# Patient Record
Sex: MALE | Race: WHITE | Employment: UNEMPLOYED | URBAN - METROPOLITAN AREA
[De-identification: names, ages, dates, MRNs, and addresses within clinical notes are randomized per-mention and may not be internally consistent; named-entity substitution may affect disease eponyms.]

---

## 2018-04-03 PROBLEM — F29 PSYCHOSIS (HCC): Status: ACTIVE | Noted: 2018-04-03

## 2018-04-04 PROBLEM — F22 DELUSIONS (HCC): Status: ACTIVE | Noted: 2018-04-04

## 2018-04-10 PROBLEM — F33.9 MAJOR DEPRESSIVE DISORDER, RECURRENT EPISODE (HCC): Status: ACTIVE | Noted: 2018-04-10

## 2018-04-10 PROBLEM — F15.20 AMPHETAMINE USE DISORDER, MODERATE (HCC): Status: ACTIVE | Noted: 2018-04-10

## 2018-04-10 PROBLEM — F22 DELUSIONAL DISORDER (HCC): Status: ACTIVE | Noted: 2018-04-10

## 2023-03-31 ENCOUNTER — HOSPITAL ENCOUNTER (EMERGENCY)
Age: 43
Discharge: LEFT BEFORE DISCHARGE | End: 2023-03-31
Attending: EMERGENCY MEDICINE
Payer: MEDICAID

## 2023-03-31 ENCOUNTER — APPOINTMENT (OUTPATIENT)
Dept: CT IMAGING | Age: 43
End: 2023-03-31
Payer: MEDICAID

## 2023-03-31 VITALS
HEIGHT: 70 IN | DIASTOLIC BLOOD PRESSURE: 76 MMHG | BODY MASS INDEX: 25.05 KG/M2 | WEIGHT: 175 LBS | RESPIRATION RATE: 21 BRPM | SYSTOLIC BLOOD PRESSURE: 103 MMHG | TEMPERATURE: 97.9 F | HEART RATE: 78 BPM | OXYGEN SATURATION: 99 %

## 2023-03-31 DIAGNOSIS — R55 VASOVAGAL REACTION: Primary | ICD-10-CM

## 2023-03-31 DIAGNOSIS — S01.01XA LACERATION OF SCALP, INITIAL ENCOUNTER: ICD-10-CM

## 2023-03-31 DIAGNOSIS — S09.90XA CLOSED HEAD INJURY, INITIAL ENCOUNTER: ICD-10-CM

## 2023-03-31 LAB
ALBUMIN SERPL-MCNC: 3.4 G/DL (ref 3.5–5.2)
ALP SERPL-CCNC: 72 U/L (ref 40–129)
ALT SERPL-CCNC: 16 U/L (ref 0–40)
AMPHET UR QL SCN: NOT DETECTED
ANION GAP SERPL CALCULATED.3IONS-SCNC: 13 MMOL/L (ref 7–16)
AST SERPL-CCNC: 24 U/L (ref 0–39)
BARBITURATES UR QL SCN: NOT DETECTED
BASOPHILS # BLD: 0.04 E9/L (ref 0–0.2)
BASOPHILS NFR BLD: 0.6 % (ref 0–2)
BENZODIAZ UR QL SCN: NOT DETECTED
BILIRUB SERPL-MCNC: 0.3 MG/DL (ref 0–1.2)
BILIRUB UR QL STRIP: NEGATIVE
BUN SERPL-MCNC: 4 MG/DL (ref 6–20)
CALCIUM SERPL-MCNC: 9 MG/DL (ref 8.6–10.2)
CANNABINOIDS UR QL SCN: NOT DETECTED
CHLORIDE SERPL-SCNC: 98 MMOL/L (ref 98–107)
CLARITY UR: CLEAR
CO2 SERPL-SCNC: 26 MMOL/L (ref 22–29)
COCAINE UR QL SCN: NOT DETECTED
COLOR UR: YELLOW
CREAT SERPL-MCNC: 0.9 MG/DL (ref 0.7–1.2)
DRUG SCREEN COMMENT UR-IMP: ABNORMAL
EKG ATRIAL RATE: 86 BPM
EKG P AXIS: 50 DEGREES
EKG P-R INTERVAL: 150 MS
EKG Q-T INTERVAL: 380 MS
EKG QRS DURATION: 90 MS
EKG QTC CALCULATION (BAZETT): 454 MS
EKG R AXIS: 58 DEGREES
EKG T AXIS: 63 DEGREES
EKG VENTRICULAR RATE: 86 BPM
EOSINOPHIL # BLD: 0.11 E9/L (ref 0.05–0.5)
EOSINOPHIL NFR BLD: 1.7 % (ref 0–6)
ERYTHROCYTE [DISTWIDTH] IN BLOOD BY AUTOMATED COUNT: 15.9 FL (ref 11.5–15)
FENTANYL SCREEN, URINE: NOT DETECTED
GLUCOSE SERPL-MCNC: 97 MG/DL (ref 74–99)
GLUCOSE UR STRIP-MCNC: NEGATIVE MG/DL
HCT VFR BLD AUTO: 34.3 % (ref 37–54)
HGB BLD-MCNC: 11.7 G/DL (ref 12.5–16.5)
HGB UR QL STRIP: NEGATIVE
IMM GRANULOCYTES # BLD: 0.02 E9/L
IMM GRANULOCYTES NFR BLD: 0.3 % (ref 0–5)
KETONES UR STRIP-MCNC: NEGATIVE MG/DL
LACTATE BLDV-SCNC: 2.8 MMOL/L (ref 0.5–2.2)
LEUKOCYTE ESTERASE UR QL STRIP: NEGATIVE
LYMPHOCYTES # BLD: 0.67 E9/L (ref 1.5–4)
LYMPHOCYTES NFR BLD: 10.5 % (ref 20–42)
MCH RBC QN AUTO: 32.1 PG (ref 26–35)
MCHC RBC AUTO-ENTMCNC: 34.1 % (ref 32–34.5)
MCV RBC AUTO: 94.2 FL (ref 80–99.9)
METHADONE UR QL SCN: NOT DETECTED
MONOCYTES # BLD: 0.63 E9/L (ref 0.1–0.95)
MONOCYTES NFR BLD: 9.9 % (ref 2–12)
NEUTROPHILS # BLD: 4.9 E9/L (ref 1.8–7.3)
NEUTS SEG NFR BLD: 77 % (ref 43–80)
NITRITE UR QL STRIP: NEGATIVE
OPIATES UR QL SCN: POSITIVE
OXYCODONE URINE: NOT DETECTED
PCP UR QL SCN: NOT DETECTED
PH UR STRIP: 6.5 [PH] (ref 5–9)
PLATELET # BLD AUTO: 213 E9/L (ref 130–450)
PMV BLD AUTO: 8.9 FL (ref 7–12)
POTASSIUM SERPL-SCNC: 3.8 MMOL/L (ref 3.5–5)
PROT SERPL-MCNC: 6.2 G/DL (ref 6.4–8.3)
PROT UR STRIP-MCNC: NEGATIVE MG/DL
RBC # BLD AUTO: 3.64 E12/L (ref 3.8–5.8)
SODIUM SERPL-SCNC: 137 MMOL/L (ref 132–146)
SP GR UR STRIP: <=1.005 (ref 1–1.03)
TROPONIN, HIGH SENSITIVITY: 10 NG/L (ref 0–11)
UROBILINOGEN UR STRIP-ACNC: 0.2 E.U./DL
WBC # BLD: 6.4 E9/L (ref 4.5–11.5)

## 2023-03-31 PROCEDURE — 90471 IMMUNIZATION ADMIN: CPT | Performed by: EMERGENCY MEDICINE

## 2023-03-31 PROCEDURE — 99284 EMERGENCY DEPT VISIT MOD MDM: CPT

## 2023-03-31 PROCEDURE — 80307 DRUG TEST PRSMV CHEM ANLYZR: CPT

## 2023-03-31 PROCEDURE — 90715 TDAP VACCINE 7 YRS/> IM: CPT | Performed by: EMERGENCY MEDICINE

## 2023-03-31 PROCEDURE — 81003 URINALYSIS AUTO W/O SCOPE: CPT

## 2023-03-31 PROCEDURE — 80053 COMPREHEN METABOLIC PANEL: CPT

## 2023-03-31 PROCEDURE — 83605 ASSAY OF LACTIC ACID: CPT

## 2023-03-31 PROCEDURE — 12002 RPR S/N/AX/GEN/TRNK2.6-7.5CM: CPT

## 2023-03-31 PROCEDURE — 70450 CT HEAD/BRAIN W/O DYE: CPT

## 2023-03-31 PROCEDURE — 36415 COLL VENOUS BLD VENIPUNCTURE: CPT

## 2023-03-31 PROCEDURE — 6370000000 HC RX 637 (ALT 250 FOR IP): Performed by: EMERGENCY MEDICINE

## 2023-03-31 PROCEDURE — 85025 COMPLETE CBC W/AUTO DIFF WBC: CPT

## 2023-03-31 PROCEDURE — 84484 ASSAY OF TROPONIN QUANT: CPT

## 2023-03-31 PROCEDURE — 6360000002 HC RX W HCPCS: Performed by: EMERGENCY MEDICINE

## 2023-03-31 RX ORDER — IBUPROFEN 400 MG/1
400 TABLET ORAL EVERY 8 HOURS PRN
Qty: 21 TABLET | Refills: 0 | Status: SHIPPED | OUTPATIENT
Start: 2023-03-31 | End: 2023-04-07

## 2023-03-31 RX ORDER — BACITRACIN, NEOMYCIN, POLYMYXIN B 400; 3.5; 5 [USP'U]/G; MG/G; [USP'U]/G
OINTMENT TOPICAL
Qty: 30 G | Refills: 0 | Status: SHIPPED | OUTPATIENT
Start: 2023-03-31 | End: 2023-04-10

## 2023-03-31 RX ORDER — ACETAMINOPHEN 500 MG
1000 TABLET ORAL ONCE
Status: COMPLETED | OUTPATIENT
Start: 2023-03-31 | End: 2023-03-31

## 2023-03-31 RX ORDER — 0.9 % SODIUM CHLORIDE 0.9 %
2000 INTRAVENOUS SOLUTION INTRAVENOUS ONCE
Status: DISCONTINUED | OUTPATIENT
Start: 2023-03-31 | End: 2023-04-01 | Stop reason: HOSPADM

## 2023-03-31 RX ADMIN — ACETAMINOPHEN 1000 MG: 500 TABLET, FILM COATED ORAL at 19:00

## 2023-03-31 RX ADMIN — TETANUS TOXOID, REDUCED DIPHTHERIA TOXOID AND ACELLULAR PERTUSSIS VACCINE, ADSORBED 0.5 ML: 5; 2.5; 8; 8; 2.5 SUSPENSION INTRAMUSCULAR at 18:58

## 2023-03-31 ASSESSMENT — PAIN SCALES - GENERAL
PAINLEVEL_OUTOF10: 6
PAINLEVEL_OUTOF10: 6
PAINLEVEL_OUTOF10: 5
PAINLEVEL_OUTOF10: 4

## 2023-03-31 ASSESSMENT — PAIN DESCRIPTION - LOCATION
LOCATION: HEAD

## 2023-03-31 ASSESSMENT — PAIN DESCRIPTION - FREQUENCY: FREQUENCY: CONTINUOUS

## 2023-03-31 ASSESSMENT — PAIN DESCRIPTION - ONSET: ONSET: SUDDEN

## 2023-03-31 ASSESSMENT — PAIN DESCRIPTION - PAIN TYPE: TYPE: ACUTE PAIN

## 2023-03-31 ASSESSMENT — PAIN DESCRIPTION - DESCRIPTORS: DESCRIPTORS: PATIENT UNABLE TO DESCRIBE

## 2023-03-31 ASSESSMENT — PAIN - FUNCTIONAL ASSESSMENT: PAIN_FUNCTIONAL_ASSESSMENT: 0-10

## 2023-03-31 NOTE — ED NOTES
laceration to back of head cleansed with warm water & washcloth, then cleaned with sure-cleanse, ice pack given to patient     [place]  03/31/23 5751

## 2023-03-31 NOTE — ED PROVIDER NOTES
RBC 3.64 (L) 3.80 - 5.80 E12/L    Hemoglobin 11.7 (L) 12.5 - 16.5 g/dL    Hematocrit 34.3 (L) 37.0 - 54.0 %    MCV 94.2 80.0 - 99.9 fL    MCH 32.1 26.0 - 35.0 pg    MCHC 34.1 32.0 - 34.5 %    RDW 15.9 (H) 11.5 - 15.0 fL    Platelets 745 845 - 747 E9/L    MPV 8.9 7.0 - 12.0 fL    Neutrophils % 77.0 43.0 - 80.0 %    Immature Granulocytes % 0.3 0.0 - 5.0 %    Lymphocytes % 10.5 (L) 20.0 - 42.0 %    Monocytes % 9.9 2.0 - 12.0 %    Eosinophils % 1.7 0.0 - 6.0 %    Basophils % 0.6 0.0 - 2.0 %    Neutrophils Absolute 4.90 1.80 - 7.30 E9/L    Immature Granulocytes # 0.02 E9/L    Lymphocytes Absolute 0.67 (L) 1.50 - 4.00 E9/L    Monocytes Absolute 0.63 0.10 - 0.95 E9/L    Eosinophils Absolute 0.11 0.05 - 0.50 E9/L    Basophils Absolute 0.04 0.00 - 0.20 E9/L   Comprehensive Metabolic Panel   Result Value Ref Range    Sodium 137 132 - 146 mmol/L    Potassium 3.8 3.5 - 5.0 mmol/L    Chloride 98 98 - 107 mmol/L    CO2 26 22 - 29 mmol/L    Anion Gap 13 7 - 16 mmol/L    Glucose 97 74 - 99 mg/dL    BUN 4 (L) 6 - 20 mg/dL    Creatinine 0.9 0.7 - 1.2 mg/dL    Est, Glom Filt Rate >60 >=60 mL/min/1.73    Calcium 9.0 8.6 - 10.2 mg/dL    Total Protein 6.2 (L) 6.4 - 8.3 g/dL    Albumin 3.4 (L) 3.5 - 5.2 g/dL    Total Bilirubin 0.3 0.0 - 1.2 mg/dL    Alkaline Phosphatase 72 40 - 129 U/L    ALT 16 0 - 40 U/L    AST 24 0 - 39 U/L   Lactic Acid   Result Value Ref Range    Lactic Acid 2.8 (H) 0.5 - 2.2 mmol/L   Urinalysis   Result Value Ref Range    Color, UA Yellow Straw/Yellow    Clarity, UA Clear Clear    Glucose, Ur Negative Negative mg/dL    Bilirubin Urine Negative Negative    Ketones, Urine Negative Negative mg/dL    Specific Gravity, UA <=1.005 1.005 - 1.030    Blood, Urine Negative Negative    pH, UA 6.5 5.0 - 9.0    Protein, UA Negative Negative mg/dL    Urobilinogen, Urine 0.2 <2.0 E.U./dL    Nitrite, Urine Negative Negative    Leukocyte Esterase, Urine Negative Negative   Urine Drug Screen   Result Value Ref Range    Amphetamine ---------------------------------    IMPRESSION  No diagnosis found. DISPOSITION  Disposition: Discharge to home  Patient condition is stable      NOTE: This report was transcribed using voice recognition software.  Every effort was made to ensure accuracy; however, inadvertent computerized transcription errors may be present       Greg Fowler MD  03/31/23 0119

## 2023-04-01 ENCOUNTER — APPOINTMENT (OUTPATIENT)
Dept: CT IMAGING | Age: 43
End: 2023-04-01
Payer: MEDICAID

## 2023-04-01 ENCOUNTER — HOSPITAL ENCOUNTER (EMERGENCY)
Age: 43
Discharge: HOME | End: 2023-04-01
Payer: MEDICAID

## 2023-04-01 VITALS
SYSTOLIC BLOOD PRESSURE: 108 MMHG | DIASTOLIC BLOOD PRESSURE: 72 MMHG | HEART RATE: 90 BPM | RESPIRATION RATE: 16 BRPM | TEMPERATURE: 97.4 F | OXYGEN SATURATION: 97 %

## 2023-04-01 DIAGNOSIS — S00.81XA ABRASION OF FACE, INITIAL ENCOUNTER: ICD-10-CM
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
## 2023-04-01 PROCEDURE — 70450 CT HEAD/BRAIN W/O DYE: CPT

## 2023-04-01 PROCEDURE — 72125 CT NECK SPINE W/O DYE: CPT

## 2023-04-01 PROCEDURE — 99284 EMERGENCY DEPT VISIT MOD MDM: CPT

## 2023-04-01 ASSESSMENT — PAIN - FUNCTIONAL ASSESSMENT: PAIN_FUNCTIONAL_ASSESSMENT: NONE - DENIES PAIN

## 2023-04-01 ASSESSMENT — LIFESTYLE VARIABLES: HOW OFTEN DO YOU HAVE A DRINK CONTAINING ALCOHOL: NEVER

## 2023-04-01 NOTE — ED PROVIDER NOTES
16 99 % -- --         General:  NAD. Alert and Oriented. Well-appearing. Skin:  Warm, dry. No rashes. Head:  Normocephalic. 1 cm abrasion to the right eyebrow. 4 staples intact to the right occipital scalp. Eyes:  EOMI. Conjunctiva normal.  Pupils are pinpoint. ENT:  Oral mucosa moist.  Airway patent. Neck:  Supple. Normal ROM. Midline cervical spine is nontender to palpation. Respiratory:  No respiratory distress. No labored breathing. Lungs clear without rales, rhonchi or wheezing. Cardiovascular:  Regular rate. No peripheral edema. Extremities warm and good color. Chest:  Abdomen:  Soft, nondistended. Normal bowel sounds. Nontender to palpation all 4 quadrants. Negative rebound, negative guarding. Rectal:  Gu: Bladder nontender and non distended. No CVA tenderness. Pelvic:  Extremities:  Normal ROM. Nontender to palpation. Atraumatic. Back:  Normal ROM. Nontender to palpation. Neuro:  Alert and Oriented to person, place, time and situation. Normal LOC. Moves all extremities. Speech fluent. Psych: Not very cooperative. Initially curled up in a ball on the bed and refused to move. After some coaxing he did lay flat on the bed and move upper extremities and lower extremities. Lab / Imaging Results   (All laboratory and radiology results have been personally reviewed by myself)  Labs:  No results found for this visit on 04/01/23. Imaging: All Radiology results interpreted by Radiologist unless otherwise noted. CT CSpine W/O Contrast   Final Result   1. Motion artifacts causes misregistration the images in the served graphic   junction levels C7-T1. Patient could not proper cooperate for the procedure. 2.  Above the area of motion artifacts there is no indication for acute   trauma injury to the cervical spine. 3.  When patient condition permits consider re-evaluation of the cervical   spine attention 7 thoracic junction.       RECOMMENDATIONS:   Unavailable present.   END OF ED PROVIDER NOTE       John Escalante [place]  04/01/23 1259

## 2023-04-01 NOTE — ED NOTES
Pt given food and was able to ambulate independently, [facility] [employer] recovery and gave report to David, pt given discharge summary with education on fall and abrasion.   Pt informed to follow up with his primary care physician     Shantel Muñoz RN  04/01/23 9785

## 2023-04-01 NOTE — ED NOTES
Patient ambulated from Rm 6 to bathroom and back; ambulated well.      [place], [place]  20/61/37 3369

## 2023-04-01 NOTE — DISCHARGE INSTRUCTIONS
NOTE:  See your family doctor or go to Urgent Care or return here for staple suture removal in 7-8 days. Cleanse wound twice daily plus apply antibiotic ointment.

## 2023-09-30 ENCOUNTER — APPOINTMENT (OUTPATIENT)
Dept: RADIOLOGY | Facility: HOSPITAL | Age: 43
End: 2023-09-30
Payer: MEDICAID

## 2023-09-30 ENCOUNTER — HOSPITAL ENCOUNTER (EMERGENCY)
Facility: HOSPITAL | Age: 43
Discharge: HOME | End: 2023-10-01
Attending: EMERGENCY MEDICINE | Admitting: STUDENT IN AN ORGANIZED HEALTH CARE EDUCATION/TRAINING PROGRAM
Payer: MEDICAID

## 2023-09-30 VITALS
RESPIRATION RATE: 18 BRPM | DIASTOLIC BLOOD PRESSURE: 82 MMHG | SYSTOLIC BLOOD PRESSURE: 128 MMHG | OXYGEN SATURATION: 95 % | BODY MASS INDEX: 22.96 KG/M2 | HEART RATE: 79 BPM | HEIGHT: 69 IN | WEIGHT: 155 LBS | TEMPERATURE: 98.8 F

## 2023-09-30 DIAGNOSIS — T40.2X1A OPIOID OVERDOSE, ACCIDENTAL OR UNINTENTIONAL, INITIAL ENCOUNTER (MULTI): Primary | ICD-10-CM

## 2023-09-30 LAB
ALBUMIN SERPL BCP-MCNC: 4.1 G/DL (ref 3.4–5)
ALP SERPL-CCNC: 72 U/L (ref 33–120)
ALT SERPL W P-5'-P-CCNC: 13 U/L (ref 10–52)
ANION GAP SERPL CALC-SCNC: 10 MMOL/L (ref 10–20)
AST SERPL W P-5'-P-CCNC: 19 U/L (ref 9–39)
BASOPHILS # BLD AUTO: 0.05 X10*3/UL (ref 0–0.1)
BASOPHILS NFR BLD AUTO: 0.8 %
BILIRUB SERPL-MCNC: 0.3 MG/DL (ref 0–1.2)
BUN SERPL-MCNC: 12 MG/DL (ref 6–23)
CALCIUM SERPL-MCNC: 8.2 MG/DL (ref 8.6–10.3)
CARDIAC TROPONIN I PNL SERPL HS: 3 NG/L (ref 0–20)
CHLORIDE SERPL-SCNC: 106 MMOL/L (ref 98–107)
CO2 SERPL-SCNC: 28 MMOL/L (ref 21–32)
CREAT SERPL-MCNC: 0.83 MG/DL (ref 0.5–1.3)
EOSINOPHIL # BLD AUTO: 0.09 X10*3/UL (ref 0–0.7)
EOSINOPHIL NFR BLD AUTO: 1.4 %
ERYTHROCYTE [DISTWIDTH] IN BLOOD BY AUTOMATED COUNT: 14.9 % (ref 11.5–14.5)
ETHANOL SERPL-MCNC: 286 MG/DL
GFR SERPL CREATININE-BSD FRML MDRD: >90 ML/MIN/1.73M*2
GLUCOSE SERPL-MCNC: 109 MG/DL (ref 74–99)
HCT VFR BLD AUTO: 44.6 % (ref 41–52)
HGB BLD-MCNC: 15.1 G/DL (ref 13.5–17.5)
IMM GRANULOCYTES # BLD AUTO: 0.02 X10*3/UL (ref 0–0.7)
IMM GRANULOCYTES NFR BLD AUTO: 0.3 % (ref 0–0.9)
LYMPHOCYTES # BLD AUTO: 1.48 X10*3/UL (ref 1.2–4.8)
LYMPHOCYTES NFR BLD AUTO: 23 %
MAGNESIUM SERPL-MCNC: 2.15 MG/DL (ref 1.6–2.4)
MCH RBC QN AUTO: 33.1 PG (ref 26–34)
MCHC RBC AUTO-ENTMCNC: 33.9 G/DL (ref 32–36)
MCV RBC AUTO: 98 FL (ref 80–100)
MONOCYTES # BLD AUTO: 0.59 X10*3/UL (ref 0.1–1)
MONOCYTES NFR BLD AUTO: 9.2 %
NEUTROPHILS # BLD AUTO: 4.21 X10*3/UL (ref 1.2–7.7)
NEUTROPHILS NFR BLD AUTO: 65.3 %
NRBC BLD-RTO: 0 /100 WBCS (ref 0–0)
PLATELET # BLD AUTO: 219 X10*3/UL (ref 150–450)
PMV BLD AUTO: 9.6 FL (ref 7.5–11.5)
POTASSIUM SERPL-SCNC: 5 MMOL/L (ref 3.5–5.3)
PROT SERPL-MCNC: 7.3 G/DL (ref 6.4–8.2)
RBC # BLD AUTO: 4.56 X10*6/UL (ref 4.5–5.9)
SODIUM SERPL-SCNC: 139 MMOL/L (ref 136–145)
WBC # BLD AUTO: 6.4 X10*3/UL (ref 4.4–11.3)

## 2023-09-30 PROCEDURE — 83735 ASSAY OF MAGNESIUM: CPT | Performed by: EMERGENCY MEDICINE

## 2023-09-30 PROCEDURE — 2500000005 HC RX 250 GENERAL PHARMACY W/O HCPCS: Performed by: STUDENT IN AN ORGANIZED HEALTH CARE EDUCATION/TRAINING PROGRAM

## 2023-09-30 PROCEDURE — 99285 EMERGENCY DEPT VISIT HI MDM: CPT | Performed by: EMERGENCY MEDICINE

## 2023-09-30 PROCEDURE — 84484 ASSAY OF TROPONIN QUANT: CPT | Performed by: EMERGENCY MEDICINE

## 2023-09-30 PROCEDURE — 36415 COLL VENOUS BLD VENIPUNCTURE: CPT | Performed by: EMERGENCY MEDICINE

## 2023-09-30 PROCEDURE — 96361 HYDRATE IV INFUSION ADD-ON: CPT

## 2023-09-30 PROCEDURE — 80053 COMPREHEN METABOLIC PANEL: CPT | Performed by: EMERGENCY MEDICINE

## 2023-09-30 PROCEDURE — 99284 EMERGENCY DEPT VISIT MOD MDM: CPT | Mod: 25,27

## 2023-09-30 PROCEDURE — 2580000001 HC RX 258 IV SOLUTIONS: Performed by: EMERGENCY MEDICINE

## 2023-09-30 PROCEDURE — 2500000004 HC RX 250 GENERAL PHARMACY W/ HCPCS (ALT 636 FOR OP/ED): Performed by: STUDENT IN AN ORGANIZED HEALTH CARE EDUCATION/TRAINING PROGRAM

## 2023-09-30 PROCEDURE — 71045 X-RAY EXAM CHEST 1 VIEW: CPT | Performed by: RADIOLOGY

## 2023-09-30 PROCEDURE — 82077 ASSAY SPEC XCP UR&BREATH IA: CPT | Performed by: EMERGENCY MEDICINE

## 2023-09-30 PROCEDURE — 71045 X-RAY EXAM CHEST 1 VIEW: CPT | Mod: FY

## 2023-09-30 PROCEDURE — S0119 ONDANSETRON 4 MG: HCPCS | Performed by: STUDENT IN AN ORGANIZED HEALTH CARE EDUCATION/TRAINING PROGRAM

## 2023-09-30 PROCEDURE — 85025 COMPLETE CBC W/AUTO DIFF WBC: CPT | Performed by: EMERGENCY MEDICINE

## 2023-09-30 PROCEDURE — 2500000001 HC RX 250 WO HCPCS SELF ADMINISTERED DRUGS (ALT 637 FOR MEDICARE OP): Performed by: STUDENT IN AN ORGANIZED HEALTH CARE EDUCATION/TRAINING PROGRAM

## 2023-09-30 PROCEDURE — 2500000004 HC RX 250 GENERAL PHARMACY W/ HCPCS (ALT 636 FOR OP/ED): Performed by: EMERGENCY MEDICINE

## 2023-09-30 PROCEDURE — 96374 THER/PROPH/DIAG INJ IV PUSH: CPT | Mod: 59

## 2023-09-30 RX ORDER — ONDANSETRON 4 MG/1
4 TABLET, ORALLY DISINTEGRATING ORAL ONCE
Status: COMPLETED | OUTPATIENT
Start: 2023-09-30 | End: 2023-09-30

## 2023-09-30 RX ORDER — NALOXONE HYDROCHLORIDE 1 MG/ML
INJECTION INTRAMUSCULAR; INTRAVENOUS; SUBCUTANEOUS
Status: DISCONTINUED
Start: 2023-09-30 | End: 2023-10-01 | Stop reason: HOSPADM

## 2023-09-30 RX ORDER — NALOXONE HYDROCHLORIDE 0.4 MG/ML
0.2 INJECTION, SOLUTION INTRAMUSCULAR; INTRAVENOUS; SUBCUTANEOUS ONCE
Status: COMPLETED | OUTPATIENT
Start: 2023-09-30 | End: 2023-09-30

## 2023-09-30 RX ORDER — ALUMINUM HYDROXIDE, MAGNESIUM HYDROXIDE, AND SIMETHICONE 1200; 120; 1200 MG/30ML; MG/30ML; MG/30ML
10 SUSPENSION ORAL ONCE
Status: COMPLETED | OUTPATIENT
Start: 2023-09-30 | End: 2023-09-30

## 2023-09-30 RX ADMIN — ALUMINUM HYDROXIDE, MAGNESIUM HYDROXIDE, AND DIMETHICONE 10 ML: 200; 20; 200 SUSPENSION ORAL at 22:51

## 2023-09-30 RX ADMIN — ONDANSETRON 4 MG: 4 TABLET, ORALLY DISINTEGRATING ORAL at 22:52

## 2023-09-30 RX ADMIN — SODIUM CHLORIDE, POTASSIUM CHLORIDE, SODIUM LACTATE AND CALCIUM CHLORIDE 1000 ML: 600; 310; 30; 20 INJECTION, SOLUTION INTRAVENOUS at 16:56

## 2023-09-30 RX ADMIN — NALOXONE HYDROCHLORIDE 0.2 MG: 0.4 INJECTION, SOLUTION INTRAMUSCULAR; INTRAVENOUS; SUBCUTANEOUS at 13:00

## 2023-09-30 ASSESSMENT — PAIN SCALES - GENERAL
PAINLEVEL_OUTOF10: 0 - NO PAIN
PAINLEVEL_OUTOF10: 8
PAINLEVEL_OUTOF10: 7

## 2023-09-30 ASSESSMENT — PAIN - FUNCTIONAL ASSESSMENT: PAIN_FUNCTIONAL_ASSESSMENT: 0-10

## 2023-09-30 ASSESSMENT — COLUMBIA-SUICIDE SEVERITY RATING SCALE - C-SSRS
2. HAVE YOU ACTUALLY HAD ANY THOUGHTS OF KILLING YOURSELF?: NO
1. IN THE PAST MONTH, HAVE YOU WISHED YOU WERE DEAD OR WISHED YOU COULD GO TO SLEEP AND NOT WAKE UP?: NO
6. HAVE YOU EVER DONE ANYTHING, STARTED TO DO ANYTHING, OR PREPARED TO DO ANYTHING TO END YOUR LIFE?: NO

## 2023-09-30 NOTE — ED PROVIDER NOTES
HPI   Chief Complaint   Patient presents with    Chest Pain    Dysphagia       HPI  Patient presents to the emergency department with complaint of mouth pain and dry tongue.  He did complain of chest discomfort in triage but denies this on my assessment.  He does have a history of throat cancer and is on methadone for chronic pain he told the nurse that he took 5 of his methadone tablets this morning as well as drink alcohol because of his mouth pain.  He is quite somnolent likely due to excessive opiate use and the history is limited as obtained from the patient                  No data recorded                Patient History   Past Medical History:   Diagnosis Date    Immunization not carried out for unspecified reason     COVID-19 vaccination not done    Other specified abnormal findings of blood chemistry     High serum cholestanol     No past surgical history on file.  No family history on file.  Social History     Tobacco Use    Smoking status: Not on file    Smokeless tobacco: Not on file   Substance Use Topics    Alcohol use: Not on file    Drug use: Not on file       Physical Exam   ED Triage Vitals [09/30/23 1219]   Temp Heart Rate Resp BP   38.1 °C (100.6 °F) 101 18 131/84      SpO2 Temp Source Heart Rate Source Patient Position   94 % Tympanic Monitor --      BP Location FiO2 (%)     -- --       Physical Exam  Vitals reviewed.   Constitutional:       General: He is awake. He is not in acute distress.     Appearance: Normal appearance. He is well-developed.      Comments: Somnolent from opiate use   HENT:      Head: Normocephalic and atraumatic.      Mouth/Throat:      Mouth: Mucous membranes are moist.   Eyes:      Extraocular Movements: Extraocular movements intact.      Conjunctiva/sclera: Conjunctivae normal.   Neck:      Comments: Mucous membranes are dry dentition poor    Cardiovascular:      Rate and Rhythm: Normal rate and regular rhythm.      Heart sounds: Normal heart sounds.   Pulmonary:       Effort: Pulmonary effort is normal. No respiratory distress.      Breath sounds: Normal breath sounds. No stridor. No wheezing.   Abdominal:      General: Bowel sounds are normal. There is no distension.      Palpations: Abdomen is soft.      Tenderness: There is no abdominal tenderness. There is no guarding or rebound.   Musculoskeletal:         General: No tenderness.      Cervical back: Neck supple.   Skin:     General: Skin is warm and dry.      Findings: No rash.   Neurological:      General: No focal deficit present.      Mental Status: Mental status is at baseline.      Cranial Nerves: Cranial nerves 2-12 are intact.      Motor: Motor function is intact.   Psychiatric:         Mood and Affect: Mood normal.         Behavior: Behavior normal.         Thought Content: Thought content normal.         ED Course & MDM        Medical Decision Making  Patient admits to taking 5 of his methadone tablets this morning as well as drinking alcohol due to mouth pain he states that his tongue feels very dry.  He is somnolent with periods of apnea and hypoxic he required 0.2 of Narcan IV for opiate overdose and woke right up after Narcan administration with improvement in his respirations and respiratory status.  He was placed on the cardiac IV monitor peripheral IV access is obtained labs are drawn.    EKG performed and independently interpreted by me as showing normal sinus rhythm at a ventricular rate of 91 with anterior septal Q waves indicating old infarct QRS duration of 94 QTc was 445 axis is upright and normal ST segments are flat not elevated no signs of acute ischemia    He was maintained on the cardiac monitor and required supplemental oxygen for hypoxia due to opiate intoxication.  His medical records were reviewed including all prescription records showing he has a history of polysubstance use disorder and has come to the emergency department twice in the last couple of days for flulike symptoms with diffuse  vague symptoms of viral illness but was able to be discharged.  He was given IV fluids for dry mucous membranes as he does clinically appear dehydrated we will reassess his pain once he awakens it but it seems like he is comfortable with his home regimen.  Once his labs are resulted and are all within normal limits he will likely be able to be discharged from the emergency department in stable condition but with his oncology team    The patient was sleeping comfortably in the emergency department and labs thus far are resulting within normal limits I did review his medical chart and the patient will need to metabolize and be reassessed he was found to have a significantly elevated ethanol level and he is intoxicated from opiate overdose.  Patient was signed out to the oncoming provider to follow-up on reevaluation once he is clinically sober      Procedure  Procedures     Clementina Peter MD  09/30/23 6502

## 2023-10-01 ENCOUNTER — APPOINTMENT (OUTPATIENT)
Dept: RADIOLOGY | Facility: HOSPITAL | Age: 43
End: 2023-10-01
Payer: MEDICAID

## 2023-10-01 ENCOUNTER — HOSPITAL ENCOUNTER (EMERGENCY)
Facility: HOSPITAL | Age: 43
Discharge: HOME | End: 2023-10-01
Attending: EMERGENCY MEDICINE | Admitting: EMERGENCY MEDICINE
Payer: MEDICAID

## 2023-10-01 VITALS
HEIGHT: 69 IN | HEART RATE: 71 BPM | TEMPERATURE: 98 F | BODY MASS INDEX: 22.22 KG/M2 | SYSTOLIC BLOOD PRESSURE: 114 MMHG | OXYGEN SATURATION: 96 % | WEIGHT: 150 LBS | RESPIRATION RATE: 16 BRPM | DIASTOLIC BLOOD PRESSURE: 73 MMHG

## 2023-10-01 DIAGNOSIS — K85.90 ACUTE PANCREATITIS, UNSPECIFIED COMPLICATION STATUS, UNSPECIFIED PANCREATITIS TYPE (HHS-HCC): Primary | ICD-10-CM

## 2023-10-01 LAB
ALBUMIN SERPL BCP-MCNC: 4.2 G/DL (ref 3.4–5)
ALP SERPL-CCNC: 85 U/L (ref 33–120)
ALT SERPL W P-5'-P-CCNC: 10 U/L (ref 10–52)
ANION GAP SERPL CALC-SCNC: 15 MMOL/L (ref 10–20)
AST SERPL W P-5'-P-CCNC: 19 U/L (ref 9–39)
BASOPHILS # BLD AUTO: 0.05 X10*3/UL (ref 0–0.1)
BASOPHILS NFR BLD AUTO: 0.3 %
BILIRUB SERPL-MCNC: 0.7 MG/DL (ref 0–1.2)
BUN SERPL-MCNC: 14 MG/DL (ref 6–23)
CALCIUM SERPL-MCNC: 8.4 MG/DL (ref 8.6–10.3)
CHLORIDE SERPL-SCNC: 95 MMOL/L (ref 98–107)
CO2 SERPL-SCNC: 27 MMOL/L (ref 21–32)
CREAT SERPL-MCNC: 0.78 MG/DL (ref 0.5–1.3)
CREAT SERPL-MCNC: 0.8 MG/DL (ref 0.5–1.3)
EOSINOPHIL # BLD AUTO: 0.02 X10*3/UL (ref 0–0.7)
EOSINOPHIL NFR BLD AUTO: 0.1 %
ERYTHROCYTE [DISTWIDTH] IN BLOOD BY AUTOMATED COUNT: 14 % (ref 11.5–14.5)
GFR SERPL CREATININE-BSD FRML MDRD: >90 ML/MIN/1.73M*2
GFR SERPL CREATININE-BSD FRML MDRD: >90 ML/MIN/1.73M*2
GLUCOSE SERPL-MCNC: 91 MG/DL (ref 74–99)
HCT VFR BLD AUTO: 44.3 % (ref 41–52)
HGB BLD-MCNC: 14.7 G/DL (ref 13.5–17.5)
HOLD SPECIMEN: NORMAL
IMM GRANULOCYTES # BLD AUTO: 0.05 X10*3/UL (ref 0–0.7)
IMM GRANULOCYTES NFR BLD AUTO: 0.3 % (ref 0–0.9)
LACTATE SERPL-SCNC: 1 MMOL/L (ref 0.4–2)
LACTATE SERPL-SCNC: 2.8 MMOL/L (ref 0.4–2)
LIPASE SERPL-CCNC: 389 U/L (ref 9–82)
LYMPHOCYTES # BLD AUTO: 1.13 X10*3/UL (ref 1.2–4.8)
LYMPHOCYTES NFR BLD AUTO: 7.8 %
MCH RBC QN AUTO: 32.6 PG (ref 26–34)
MCHC RBC AUTO-ENTMCNC: 33.2 G/DL (ref 32–36)
MCV RBC AUTO: 98 FL (ref 80–100)
MONOCYTES # BLD AUTO: 1.19 X10*3/UL (ref 0.1–1)
MONOCYTES NFR BLD AUTO: 8.2 %
NEUTROPHILS # BLD AUTO: 12.02 X10*3/UL (ref 1.2–7.7)
NEUTROPHILS NFR BLD AUTO: 83.3 %
NRBC BLD-RTO: 0 /100 WBCS (ref 0–0)
PLATELET # BLD AUTO: 212 X10*3/UL (ref 150–450)
PMV BLD AUTO: 8.7 FL (ref 7.5–11.5)
POTASSIUM SERPL-SCNC: 3.9 MMOL/L (ref 3.5–5.3)
PROT SERPL-MCNC: 7.5 G/DL (ref 6.4–8.2)
RBC # BLD AUTO: 4.51 X10*6/UL (ref 4.5–5.9)
SODIUM SERPL-SCNC: 133 MMOL/L (ref 136–145)
WBC # BLD AUTO: 14.5 X10*3/UL (ref 4.4–11.3)

## 2023-10-01 PROCEDURE — 99284 EMERGENCY DEPT VISIT MOD MDM: CPT | Performed by: EMERGENCY MEDICINE

## 2023-10-01 PROCEDURE — 36415 COLL VENOUS BLD VENIPUNCTURE: CPT | Performed by: NURSE PRACTITIONER

## 2023-10-01 PROCEDURE — 74177 CT ABD & PELVIS W/CONTRAST: CPT | Mod: FOREIGN READ | Performed by: RADIOLOGY

## 2023-10-01 PROCEDURE — 83690 ASSAY OF LIPASE: CPT | Performed by: NURSE PRACTITIONER

## 2023-10-01 PROCEDURE — 74177 CT ABD & PELVIS W/CONTRAST: CPT | Mod: FR

## 2023-10-01 PROCEDURE — 83605 ASSAY OF LACTIC ACID: CPT | Performed by: NURSE PRACTITIONER

## 2023-10-01 PROCEDURE — 2500000004 HC RX 250 GENERAL PHARMACY W/ HCPCS (ALT 636 FOR OP/ED): Performed by: NURSE PRACTITIONER

## 2023-10-01 PROCEDURE — 80053 COMPREHEN METABOLIC PANEL: CPT | Performed by: NURSE PRACTITIONER

## 2023-10-01 PROCEDURE — 96374 THER/PROPH/DIAG INJ IV PUSH: CPT | Mod: 59

## 2023-10-01 PROCEDURE — 82565 ASSAY OF CREATININE: CPT | Mod: 59 | Performed by: NURSE PRACTITIONER

## 2023-10-01 PROCEDURE — 85025 COMPLETE CBC W/AUTO DIFF WBC: CPT | Performed by: NURSE PRACTITIONER

## 2023-10-01 PROCEDURE — 2550000001 HC RX 255 CONTRASTS: Performed by: NURSE PRACTITIONER

## 2023-10-01 RX ORDER — LIDOCAINE HYDROCHLORIDE 20 MG/ML
1.25 SOLUTION OROPHARYNGEAL AS NEEDED
Qty: 100 ML | Refills: 0 | Status: SHIPPED | OUTPATIENT
Start: 2023-10-01 | End: 2023-10-02

## 2023-10-01 RX ORDER — ONDANSETRON HYDROCHLORIDE 2 MG/ML
4 INJECTION, SOLUTION INTRAVENOUS ONCE
Status: COMPLETED | OUTPATIENT
Start: 2023-10-01 | End: 2023-10-01

## 2023-10-01 RX ADMIN — ONDANSETRON 4 MG: 2 INJECTION INTRAMUSCULAR; INTRAVENOUS at 07:53

## 2023-10-01 RX ADMIN — IOHEXOL 75 ML: 350 INJECTION, SOLUTION INTRAVENOUS at 08:53

## 2023-10-01 RX ADMIN — SODIUM CHLORIDE 1000 ML: 9 INJECTION, SOLUTION INTRAVENOUS at 07:54

## 2023-10-01 NOTE — ED PROVIDER NOTES
HPI   Chief Complaint   Patient presents with    Vomiting       42-year-old male with a past history of esophageal cancer, hyperlipidemia, hypertension presents to the emergency department today complaining of nausea, vomiting, dry mouth and difficulty swallowing.  Patient states he has been having this since yesterday.  He was seen in our emergency department yesterday and discharged home.  No fever or chills.  No dizziness, headache, syncope.  No chest pain or back pain.  Denies any significant abdominal pain at this time.  He does have some Zofran at home but did not take anything.  Was discharged yesterday with antibiotics as well.  No dizziness, headache or syncope.                          [place] Coma Scale Score: 15                  Patient History   Past Medical History:   Diagnosis Date    Immunization not carried out for unspecified reason     COVID-19 vaccination not done    Other specified abnormal findings of blood chemistry     High serum cholestanol     History reviewed. No pertinent surgical history.  No family history on file.  Social History     Tobacco Use    Smoking status: Not on file    Smokeless tobacco: Not on file   Substance Use Topics    Alcohol use: Not on file    Drug use: Not on file       Physical Exam   ED Triage Vitals [10/01/23 0135]   Temp Heart Rate Resp BP   36.7 °C (98 °F) 87 16 125/79      SpO2 Temp Source Heart Rate Source Patient Position   96 % Temporal -- --      BP Location FiO2 (%)     -- --       Physical Exam  Vitals and nursing note reviewed.   Constitutional:       General: He is not in acute distress.     Appearance: Normal appearance. He is not toxic-appearing.   HENT:      Right Ear: Tympanic membrane normal.      Left Ear: Tympanic membrane normal.      Mouth/Throat:      Mouth: Mucous membranes are moist.      Pharynx: Oropharynx is clear.   Eyes:      Extraocular Movements: Extraocular movements intact.      Pupils: Pupils are equal, round, and reactive to  light.   Cardiovascular:      Rate and Rhythm: Normal rate and regular rhythm.      Pulses: Normal pulses.      Heart sounds: Normal heart sounds.   Pulmonary:      Effort: Pulmonary effort is normal.      Breath sounds: Normal breath sounds.   Abdominal:      General: Abdomen is flat. Bowel sounds are normal.      Palpations: Abdomen is soft.      Tenderness: There is abdominal tenderness (genrealized tenderness no rebound or guarding).   Musculoskeletal:         General: Normal range of motion.      Cervical back: Normal range of motion and neck supple.   Skin:     General: Skin is warm and dry.      Capillary Refill: Capillary refill takes less than 2 seconds.   Neurological:      General: No focal deficit present.      Mental Status: He is alert and oriented to person, place, and time.   Psychiatric:         Mood and Affect: Mood normal.         Behavior: Behavior normal.         Judgment: Judgment normal.         Labs Reviewed   CBC WITH AUTO DIFFERENTIAL - Abnormal       Result Value    WBC 14.5 (*)     nRBC 0.0      RBC 4.51      Hemoglobin 14.7      Hematocrit 44.3      MCV 98      MCH 32.6      MCHC 33.2      RDW 14.0      Platelets 212      MPV 8.7      Neutrophils % 83.3      Immature Granulocytes %, Automated 0.3      Lymphocytes % 7.8      Monocytes % 8.2      Eosinophils % 0.1      Basophils % 0.3      Neutrophils Absolute 12.02 (*)     Immature Granulocytes Absolute, Automated 0.05      Lymphocytes Absolute 1.13 (*)     Monocytes Absolute 1.19 (*)     Eosinophils Absolute 0.02      Basophils Absolute 0.05     COMPREHENSIVE METABOLIC PANEL - Abnormal    Glucose 91      Sodium 133 (*)     Potassium 3.9      Chloride 95 (*)     Bicarbonate 27      Anion Gap 15      Urea Nitrogen 14      Creatinine 0.80      eGFR >90      Calcium 8.4 (*)     Albumin 4.2      Alkaline Phosphatase 85      Total Protein 7.5      AST 19      Bilirubin, Total 0.7      ALT 10     LIPASE - Abnormal    Lipase 389 (*)      Narrative:     Venipuncture immediately after or during the administration of Metamizole may lead to falsely low results. Testing should be performed immediately prior to Metamizole dosing.   LACTATE - Abnormal    Lactate 2.8 (*)     Narrative:     Venipuncture immediately after or during the administration of Metamizole may lead to falsely low results. Testing should be performed immediately  prior to Metamizole dosing.   LACTATE - Normal    Lactate 1.0      Narrative:     Venipuncture immediately after or during the administration of Metamizole may lead to falsely low results. Testing should be performed immediately  prior to Metamizole dosing.   CREATININE - Normal    Creatinine 0.78      eGFR >90     GRAY TOP     CT abdomen pelvis w IV contrast   Final Result   1.  Thickening of the sigmoid colon is seen.  Correlate clinically for   signs of infectious or inflammatory colitis   2.  Thickening of the distal esophagus and stomach for which   underlying esophagitis/gastritis could be considered in the   appropriate clinical setting.    3.  There is mild fat stranding surrounding the head and uncinate   process of the pancreas.  Prominent lymph nodes are also seen within   the mesentery and retroperitoneum.  Correlate clinically for signs of   pancreatitis.   4.  There is subtle fat stranding surrounding the head and uncinate   process of the pancreas.  No drainable fluid collection is seen.    Correlate clinically for signs of pancreatitis.   Signed by Kerry Cohen MD          ED Course & MDM   Diagnoses as of 10/01/23 1244   Acute pancreatitis, unspecified complication status, unspecified pancreatitis type       Medical Decision Making  On initial evaluation patient is well-appearing in the emergency department at this time.  He is having some nausea currently but no vomiting.  He is having generalized tenderness throughout his abdomen with no rebound or guarding.  Patient's lab work was obtained initial lactic is  elevated at 2.8 he was given a liter in the emergency department with some Zofran.  Repeat improved at 1.  Lipase is elevated 389.  No history of pancreatitis in the past.  Hyponatremia 133 otherwise unremarkable and his white count is elevated 14.5 compared to most recent.  CT of the abdomen and pelvis was obtained which does show some stranding around the concerning for pancreatitis patient does not have any rebound or guarding in his abdomen.  He is drinking and eating food in the emergency department.  We sat down discussed results in depth with the patient.  States that he is feeling better at this time however is still having some pain with swallowing.  He was given Sanders cocktail in the emergency department with improvement.  Patient states he feels comfortable going home at this point.  We did discuss strict return precautions with him and outpatient follow-up.  He does have an outpatient follow-up coming within the next week.  He has no further questions or concerns at this time.  Patient will be discharged in stable condition.        Procedure  Procedures        I discussed the differential, results and discharge plan with the patient and/or family/friend/caregiver if present.  I emphasized the importance of follow-up with the physician I referred them to in the timeframe recommended.  I explained reasons for the patient to return to the Emergency Department. Additional verbal discharge instructions were also given and discussed with the patient to supplement those generated by the EMR. We also discussed medications that were prescribed (if any) including common side effects and interactions. The patient was advised to abstain from driving, operating heavy machinery or making significant decisions while taking medications such as opiates and muscle relaxers that may impair this. All questions were addressed.  They understand return precautions and discharge instructions. The patient and/or  family/friend/caregiver expressed understanding.           Tiera Holley, APRN-CNP  10/09/23 0609

## 2023-10-01 NOTE — ED PROVIDER NOTES
HPI   Chief Complaint   Patient presents with    Vomiting       HPI                    [place] Coma Scale Score: 15                  Patient History   Past Medical History:   Diagnosis Date    Immunization not carried out for unspecified reason     COVID-19 vaccination not done    Other specified abnormal findings of blood chemistry     High serum cholestanol     History reviewed. No pertinent surgical history.  No family history on file.  Social History     Tobacco Use    Smoking status: Not on file    Smokeless tobacco: Not on file   Substance Use Topics    Alcohol use: Not on file    Drug use: Not on file       Physical Exam   ED Triage Vitals [10/01/23 0135]   Temp Heart Rate Resp BP   36.7 °C (98 °F) 87 16 125/79      SpO2 Temp Source Heart Rate Source Patient Position   96 % Temporal -- --      BP Location FiO2 (%)     -- --       Physical Exam    ED Course & MDM        Medical Decision Making      Procedure  Procedures

## 2023-10-01 NOTE — ED PROVIDER NOTES
I have accept care of this patient in signout.    In summary:  I received this patient in signout.  In summary this is a 42-year-old male who has throat cancer on methadone presents emergency department for chest discomfort and work-up has been unremarkable he was found to be intoxicated.  Required a dose of Narcan which did improve his symptoms.   suspect patient had too much of his opioid which she states he ran out of his prescription medication to get today.  He denies SI/HI.  Had couple episodes of vomiting after he woke up clear which I suspect is secondary to his opioid withdrawal.  Patient was given Zofran and Maalox reports improvement of symptoms.  This time he is safe for discharge home.      Labs Reviewed   CBC WITH AUTO DIFFERENTIAL - Abnormal       Result Value    WBC 6.4      nRBC 0.0      RBC 4.56      Hemoglobin 15.1      Hematocrit 44.6      MCV 98      MCH 33.1      MCHC 33.9      RDW 14.9 (*)     Platelets 219      MPV 9.6      Neutrophils % 65.3      Immature Granulocytes %, Automated 0.3      Lymphocytes % 23.0      Monocytes % 9.2      Eosinophils % 1.4      Basophils % 0.8      Neutrophils Absolute 4.21      Immature Granulocytes Absolute, Automated 0.02      Lymphocytes Absolute 1.48      Monocytes Absolute 0.59      Eosinophils Absolute 0.09      Basophils Absolute 0.05     COMPREHENSIVE METABOLIC PANEL - Abnormal    Glucose 109 (*)     Sodium 139      Potassium 5.0      Chloride 106      Bicarbonate 28      Anion Gap 10      Urea Nitrogen 12      Creatinine 0.83      eGFR >90      Calcium 8.2 (*)     Albumin 4.1      Alkaline Phosphatase 72      Total Protein 7.3      AST 19      Bilirubin, Total 0.3      ALT 13     ALCOHOL - Abnormal    Alcohol 286 (*)    MAGNESIUM - Normal    Magnesium 2.15     SERIAL TROPONIN-INITIAL - Normal    Troponin I, High Sensitivity 3      Narrative:     Less than 99th percentile of normal range cutoff-  Female and children under 18 years old <14 ng/L; Male <21  ng/L: Negative  Repeat testing should be performed if clinically indicated.     Female and children under 18 years old 14-50 ng/L; Male 21-50 ng/L:  Consistent with possible cardiac damage and possible increased clinical   risk. Serial measurements may help to assess extent of myocardial damage.     >50 ng/L: Consistent with cardiac damage, increased clinical risk and  myocardial infarction. Serial measurements may help assess extent of   myocardial damage.      NOTE: Children less than 1 year old may have higher baseline troponin   levels and results should be interpreted in conjunction with the overall   clinical context.     NOTE: Troponin I testing is performed using a different   testing methodology at [facility] than at other   [facility]. Direct result comparisons should only   be made within the same method.   TROPONIN SERIES- (INITIAL, 1 HR)    Narrative:     The following orders were created for panel order Troponin I Series, High Sensitivity (0, 1 HR).  Procedure                               Abnormality         Status                     ---------                               -----------         ------                     Troponin I, High Sensiti...[[id number]]  Normal              Final result               Troponin, High Sensitivi...[[id number]]                                                   Please view results for these tests on the individual orders.   DRUG SCREEN,URINE   SERIAL TROPONIN, 1 HOUR   SERIAL TROPONIN, 1 HOUR     XR chest 1 view   Final Result   Hypoventilatory changes with bibasilar atelectasis.        MACRO:   None        Signed by: Vero Moya 9/30/2023 4:20 PM   Dictation workstation:   [id number]           Kateryna Linder MD  09/30/23 5671

## 2023-10-04 ENCOUNTER — HOSPITAL ENCOUNTER (EMERGENCY)
Facility: HOSPITAL | Age: 43
Discharge: HOME | End: 2023-10-04
Attending: EMERGENCY MEDICINE | Admitting: EMERGENCY MEDICINE
Payer: MEDICAID

## 2023-10-04 VITALS
TEMPERATURE: 98.3 F | HEIGHT: 68 IN | RESPIRATION RATE: 20 BRPM | OXYGEN SATURATION: 98 % | WEIGHT: 156.53 LBS | HEART RATE: 110 BPM | BODY MASS INDEX: 23.72 KG/M2

## 2023-10-04 DIAGNOSIS — Z53.20 MENTAL HEALTH ASSESSMENT DECLINED: Primary | ICD-10-CM

## 2023-10-04 PROBLEM — I95.1 ORTHOSTATIC HYPOTENSION: Status: ACTIVE | Noted: 2023-10-04

## 2023-10-04 PROBLEM — F19.10 POLYSUBSTANCE ABUSE (MULTI): Status: ACTIVE | Noted: 2023-10-04

## 2023-10-04 PROBLEM — F51.9 NON-ORGANIC SLEEP DISORDER: Status: ACTIVE | Noted: 2023-10-04

## 2023-10-04 PROBLEM — J39.2 OROPHARYNGEAL LESION: Status: ACTIVE | Noted: 2023-10-04

## 2023-10-04 PROBLEM — R44.0 AUDITORY HALLUCINATIONS: Status: ACTIVE | Noted: 2023-10-04

## 2023-10-04 PROBLEM — F29 PSYCHOSIS (MULTI): Status: ACTIVE | Noted: 2023-10-04

## 2023-10-04 PROBLEM — H66.3X3 CHRONIC SUPPURATIVE OTITIS MEDIA OF BOTH EARS: Status: ACTIVE | Noted: 2023-10-04

## 2023-10-04 PROBLEM — F12.90 CANNABIS USE, UNCOMPLICATED: Status: ACTIVE | Noted: 2023-10-04

## 2023-10-04 PROBLEM — F22 DELUSIONS (MULTI): Status: ACTIVE | Noted: 2023-10-04

## 2023-10-04 PROBLEM — J35.8 TONSILLAR MASS: Status: ACTIVE | Noted: 2023-10-04
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
## 2023-10-04 PROBLEM — K11.7 XEROSTOMIA DUE TO RADIOTHERAPY: Status: ACTIVE | Noted: 2023-10-04

## 2023-10-04 PROBLEM — C09.9 MALIGNANT NEOPLASM OF TONSIL (MULTI): Status: ACTIVE | Noted: 2023-10-04

## 2023-10-04 PROBLEM — T50.901A POISONING BY DRUG OR MEDICINAL SUBSTANCE: Status: ACTIVE | Noted: 2023-10-04

## 2023-10-04 PROBLEM — Z91.148 CONTROLLED SUBSTANCE AGREEMENT TERMINATED: Status: ACTIVE | Noted: 2023-10-04

## 2023-10-04 PROBLEM — I10 HYPERTENSION, ESSENTIAL: Status: ACTIVE | Noted: 2023-10-04

## 2023-10-04 PROBLEM — G89.3 NEOPLASM RELATED PAIN: Status: ACTIVE | Noted: 2023-10-04

## 2023-10-04 PROBLEM — R45.851 SUICIDAL IDEATION: Status: ACTIVE | Noted: 2023-10-04

## 2023-10-04 PROBLEM — F14.90 COCAINE USE: Status: ACTIVE | Noted: 2023-10-04

## 2023-10-04 PROBLEM — J03.90 TONSILLITIS: Status: ACTIVE | Noted: 2023-10-04

## 2023-10-04 PROBLEM — F41.9 ANXIETY AND DEPRESSION: Status: ACTIVE | Noted: 2023-10-04

## 2023-10-04 PROBLEM — E78.5 HYPERLIPOPROTEINEMIA: Status: ACTIVE | Noted: 2023-10-04

## 2023-10-04 PROBLEM — F17.200 TOBACCO DEPENDENCE: Status: ACTIVE | Noted: 2023-10-04

## 2023-10-04 PROBLEM — H53.9 VISION CHANGES: Status: ACTIVE | Noted: 2023-10-04

## 2023-10-04 PROBLEM — F25.0 SCHIZOAFFECTIVE DISORDER, BIPOLAR TYPE (MULTI): Status: ACTIVE | Noted: 2023-10-04

## 2023-10-04 PROBLEM — T50.902A INTENTIONAL OVERDOSE (MULTI): Status: ACTIVE | Noted: 2023-10-04

## 2023-10-04 PROBLEM — J39.2 THROAT MASS: Status: ACTIVE | Noted: 2023-10-04

## 2023-10-04 PROBLEM — K21.9 GERD (GASTROESOPHAGEAL REFLUX DISEASE): Status: ACTIVE | Noted: 2023-10-04

## 2023-10-04 PROBLEM — F10.11 HISTORY OF ALCOHOL ABUSE: Status: ACTIVE | Noted: 2023-10-04

## 2023-10-04 PROBLEM — C09.9 TONSIL CANCER (MULTI): Status: ACTIVE | Noted: 2023-10-04

## 2023-10-04 PROBLEM — F32.A ANXIETY AND DEPRESSION: Status: ACTIVE | Noted: 2023-10-04

## 2023-10-04 PROBLEM — N52.9 ERECTILE DYSFUNCTION: Status: ACTIVE | Noted: 2023-10-04

## 2023-10-04 PROBLEM — F31.9 BIPOLAR DEPRESSION (MULTI): Status: ACTIVE | Noted: 2023-10-04

## 2023-10-04 PROBLEM — F19.90 ILLICIT DRUG USE: Status: ACTIVE | Noted: 2023-10-04

## 2023-10-04 PROBLEM — F32.A DEPRESSION WITH SUICIDAL IDEATION: Status: ACTIVE | Noted: 2023-10-04

## 2023-10-04 PROBLEM — R22.1 NECK MASS: Status: ACTIVE | Noted: 2023-10-04

## 2023-10-04 PROBLEM — C44.92 SCC (SQUAMOUS CELL CARCINOMA): Status: ACTIVE | Noted: 2023-10-04

## 2023-10-04 PROBLEM — R07.9 CHEST PAIN: Status: ACTIVE | Noted: 2023-10-04

## 2023-10-04 PROBLEM — F41.0 PANIC ATTACKS: Status: ACTIVE | Noted: 2023-10-04

## 2023-10-04 PROBLEM — F10.20 ALCOHOL USE DISORDER, SEVERE, DEPENDENCE (MULTI): Status: ACTIVE | Noted: 2023-10-04

## 2023-10-04 PROBLEM — F11.20: Status: ACTIVE | Noted: 2023-10-04

## 2023-10-04 PROBLEM — R13.12 OROPHARYNGEAL DYSPHAGIA: Status: ACTIVE | Noted: 2023-10-04

## 2023-10-04 PROBLEM — R13.10 DYSPHAGIA: Status: ACTIVE | Noted: 2023-10-04

## 2023-10-04 PROBLEM — R45.851 DEPRESSION WITH SUICIDAL IDEATION: Status: ACTIVE | Noted: 2023-10-04

## 2023-10-04 PROBLEM — F31.32 BIPOLAR AFFECTIVE DISORDER, CURRENTLY DEPRESSED, MODERATE (MULTI): Status: ACTIVE | Noted: 2023-10-04

## 2023-10-04 PROBLEM — F15.10 METHAMPHETAMINE ABUSE (MULTI): Status: ACTIVE | Noted: 2023-10-04

## 2023-10-04 LAB
ALBUMIN SERPL BCP-MCNC: 4 G/DL (ref 3.4–5)
ALP SERPL-CCNC: 74 U/L (ref 33–120)
ALT SERPL W P-5'-P-CCNC: 29 U/L (ref 10–52)
ANION GAP SERPL CALC-SCNC: 17 MMOL/L (ref 10–20)
APAP SERPL-MCNC: <10 UG/ML
AST SERPL W P-5'-P-CCNC: 64 U/L (ref 9–39)
BASOPHILS # BLD AUTO: 0.02 X10*3/UL (ref 0–0.1)
BASOPHILS NFR BLD AUTO: 0.2 %
BILIRUB SERPL-MCNC: 1.1 MG/DL (ref 0–1.2)
BUN SERPL-MCNC: 14 MG/DL (ref 6–23)
CALCIUM SERPL-MCNC: 9.1 MG/DL (ref 8.6–10.3)
CHLORIDE SERPL-SCNC: 96 MMOL/L (ref 98–107)
CO2 SERPL-SCNC: 24 MMOL/L (ref 21–32)
CREAT SERPL-MCNC: 0.76 MG/DL (ref 0.5–1.3)
EOSINOPHIL # BLD AUTO: 0.02 X10*3/UL (ref 0–0.7)
EOSINOPHIL NFR BLD AUTO: 0.2 %
ERYTHROCYTE [DISTWIDTH] IN BLOOD BY AUTOMATED COUNT: 13.5 % (ref 11.5–14.5)
ETHANOL SERPL-MCNC: <10 MG/DL
GFR SERPL CREATININE-BSD FRML MDRD: >90 ML/MIN/1.73M*2
GLUCOSE SERPL-MCNC: 65 MG/DL (ref 74–99)
HCT VFR BLD AUTO: 37.5 % (ref 41–52)
HGB BLD-MCNC: 12.7 G/DL (ref 13.5–17.5)
IMM GRANULOCYTES # BLD AUTO: 0.04 X10*3/UL (ref 0–0.7)
IMM GRANULOCYTES NFR BLD AUTO: 0.5 % (ref 0–0.9)
LYMPHOCYTES # BLD AUTO: 0.5 X10*3/UL (ref 1.2–4.8)
LYMPHOCYTES NFR BLD AUTO: 6.1 %
MCH RBC QN AUTO: 32.5 PG (ref 26–34)
MCHC RBC AUTO-ENTMCNC: 33.9 G/DL (ref 32–36)
MCV RBC AUTO: 96 FL (ref 80–100)
MONOCYTES # BLD AUTO: 0.99 X10*3/UL (ref 0.1–1)
MONOCYTES NFR BLD AUTO: 12.1 %
NEUTROPHILS # BLD AUTO: 6.6 X10*3/UL (ref 1.2–7.7)
NEUTROPHILS NFR BLD AUTO: 80.9 %
NRBC BLD-RTO: 0 /100 WBCS (ref 0–0)
PLATELET # BLD AUTO: 163 X10*3/UL (ref 150–450)
PMV BLD AUTO: 8.4 FL (ref 7.5–11.5)
POTASSIUM SERPL-SCNC: 3.4 MMOL/L (ref 3.5–5.3)
PROT SERPL-MCNC: 7.3 G/DL (ref 6.4–8.2)
RBC # BLD AUTO: 3.91 X10*6/UL (ref 4.5–5.9)
SALICYLATES SERPL-MCNC: <3 MG/DL
SODIUM SERPL-SCNC: 134 MMOL/L (ref 136–145)
WBC # BLD AUTO: 8.2 X10*3/UL (ref 4.4–11.3)

## 2023-10-04 PROCEDURE — 99284 EMERGENCY DEPT VISIT MOD MDM: CPT | Performed by: EMERGENCY MEDICINE

## 2023-10-04 PROCEDURE — 99283 EMERGENCY DEPT VISIT LOW MDM: CPT

## 2023-10-04 PROCEDURE — 80329 ANALGESICS NON-OPIOID 1 OR 2: CPT | Performed by: EMERGENCY MEDICINE

## 2023-10-04 PROCEDURE — 36415 COLL VENOUS BLD VENIPUNCTURE: CPT | Performed by: EMERGENCY MEDICINE

## 2023-10-04 PROCEDURE — 84075 ASSAY ALKALINE PHOSPHATASE: CPT | Performed by: EMERGENCY MEDICINE

## 2023-10-04 PROCEDURE — 80053 COMPREHEN METABOLIC PANEL: CPT | Performed by: EMERGENCY MEDICINE

## 2023-10-04 PROCEDURE — 85025 COMPLETE CBC W/AUTO DIFF WBC: CPT | Performed by: EMERGENCY MEDICINE

## 2023-10-04 RX ORDER — LISINOPRIL AND HYDROCHLOROTHIAZIDE 12.5; 2 MG/1; MG/1
1 TABLET ORAL
COMMUNITY
Start: 2016-01-29 | End: 2023-11-09 | Stop reason: WASHOUT

## 2023-10-04 RX ORDER — FLUOXETINE HYDROCHLORIDE 20 MG/1
40 CAPSULE ORAL EVERY MORNING
COMMUNITY
Start: 2019-05-17 | End: 2023-11-09 | Stop reason: WASHOUT

## 2023-10-04 RX ORDER — ATORVASTATIN CALCIUM 10 MG/1
10 TABLET, FILM COATED ORAL
COMMUNITY
Start: 2019-04-23 | End: 2023-11-09 | Stop reason: WASHOUT

## 2023-10-04 RX ORDER — DEXTROAMPHETAMINE SACCHARATE, AMPHETAMINE ASPARTATE, DEXTROAMPHETAMINE SULFATE AND AMPHETAMINE SULFATE 7.5; 7.5; 7.5; 7.5 MG/1; MG/1; MG/1; MG/1
30 TABLET ORAL 2 TIMES DAILY
COMMUNITY
Start: 2016-09-15 | End: 2023-11-09 | Stop reason: WASHOUT

## 2023-10-04 RX ORDER — NICOTINE POLACRILEX 4 MG/1
4 LOZENGE ORAL
COMMUNITY
End: 2023-11-09 | Stop reason: SDUPTHER

## 2023-10-04 RX ORDER — ERGOCALCIFEROL 1.25 MG/1
50000 CAPSULE ORAL WEEKLY
COMMUNITY
Start: 2019-05-17 | End: 2023-11-09 | Stop reason: WASHOUT

## 2023-10-04 RX ORDER — CARBAMAZEPINE 200 MG/1
400 TABLET, EXTENDED RELEASE ORAL
COMMUNITY
Start: 2016-01-29 | End: 2023-11-09 | Stop reason: WASHOUT

## 2023-10-04 RX ORDER — LISINOPRIL 10 MG/1
10 TABLET ORAL 2 TIMES DAILY
COMMUNITY
Start: 2019-05-17 | End: 2023-11-09 | Stop reason: WASHOUT

## 2023-10-04 RX ORDER — QUETIAPINE FUMARATE 300 MG/1
600 TABLET, FILM COATED ORAL NIGHTLY
COMMUNITY
Start: 2019-05-17 | End: 2023-11-09 | Stop reason: WASHOUT

## 2023-10-04 RX ORDER — PRAZOSIN HYDROCHLORIDE 2 MG/1
2 CAPSULE ORAL NIGHTLY
COMMUNITY
Start: 2019-05-17 | End: 2023-11-09 | Stop reason: WASHOUT

## 2023-10-04 RX ORDER — TRAZODONE HYDROCHLORIDE 100 MG/1
100 TABLET ORAL DAILY PRN
COMMUNITY
Start: 2019-05-15 | End: 2023-11-09 | Stop reason: WASHOUT

## 2023-10-04 RX ORDER — OMEPRAZOLE 40 MG/1
40 CAPSULE, DELAYED RELEASE ORAL
COMMUNITY
Start: 2019-05-17 | End: 2023-11-09 | Stop reason: WASHOUT

## 2023-10-04 RX ORDER — HYDROXYZINE PAMOATE 50 MG/1
50 CAPSULE ORAL 2 TIMES DAILY PRN
COMMUNITY
Start: 2019-05-17 | End: 2023-11-09 | Stop reason: WASHOUT

## 2023-10-04 RX ORDER — SERTRALINE HYDROCHLORIDE 100 MG/1
2 TABLET, FILM COATED ORAL DAILY
COMMUNITY
Start: 2016-01-29 | End: 2023-11-09 | Stop reason: WASHOUT

## 2023-10-05 ENCOUNTER — TELEPHONE (OUTPATIENT)
Dept: HEMATOLOGY/ONCOLOGY | Facility: HOSPITAL | Age: 43
End: 2023-10-05
Payer: MEDICAID

## 2023-10-05 NOTE — ED PROVIDER NOTES
HPI   Chief Complaint   Patient presents with   • Psychiatric Evaluation       HPI                    No data recorded                Patient History   Past Medical History:   Diagnosis Date   • Bipolar 1 disorder (CMS/HCC)    • Immunization not carried out for unspecified reason     COVID-19 vaccination not done   • Other specified abnormal findings of blood chemistry     High serum cholestanol     No past surgical history on file.  No family history on file.  Social History     Tobacco Use   • Smoking status: Some Days     Types: Cigarettes   • Smokeless tobacco: Not on file   Substance Use Topics   • Alcohol use: Never   • Drug use: Yes     Types: Marijuana       Physical Exam   ED Triage Vitals [10/04/23 0413]   Temp Heart Rate Resp BP   36.8 °C (98.3 °F) 110 20 --      SpO2 Temp Source Heart Rate Source Patient Position   98 % Temporal -- Sitting      BP Location FiO2 (%)     Left arm --       Physical Exam    ED Course & MDM   Diagnoses as of 10/05/23 0832   Mental health assessment declined       Medical Decision Making      Procedure  Procedures

## 2023-10-12 ENCOUNTER — TELEPHONE (OUTPATIENT)
Dept: ADMISSION | Facility: HOSPITAL | Age: 43
End: 2023-10-12
Payer: MEDICAID

## 2023-10-12 ENCOUNTER — HOSPITAL ENCOUNTER (OUTPATIENT)
Facility: HOSPITAL | Age: 43
Setting detail: OBSERVATION
Discharge: HOME | End: 2023-10-13
Attending: EMERGENCY MEDICINE | Admitting: EMERGENCY MEDICINE
Payer: MEDICAID

## 2023-10-12 DIAGNOSIS — G89.3 NEOPLASM RELATED PAIN: ICD-10-CM

## 2023-10-12 DIAGNOSIS — R62.7 FAILURE TO THRIVE IN ADULT: ICD-10-CM

## 2023-10-12 DIAGNOSIS — K13.79 MOUTH PAIN: Primary | ICD-10-CM

## 2023-10-12 DIAGNOSIS — C44.92 SCC (SQUAMOUS CELL CARCINOMA): ICD-10-CM

## 2023-10-12 LAB
ANION GAP SERPL CALC-SCNC: 16 MMOL/L (ref 10–20)
BASOPHILS # BLD AUTO: 0.04 X10*3/UL (ref 0–0.1)
BASOPHILS NFR BLD AUTO: 0.5 %
BUN SERPL-MCNC: 17 MG/DL (ref 6–23)
CALCIUM SERPL-MCNC: 9.5 MG/DL (ref 8.6–10.6)
CHLORIDE SERPL-SCNC: 98 MMOL/L (ref 98–107)
CO2 SERPL-SCNC: 27 MMOL/L (ref 21–32)
CREAT SERPL-MCNC: 0.74 MG/DL (ref 0.5–1.3)
EOSINOPHIL # BLD AUTO: 0.06 X10*3/UL (ref 0–0.7)
EOSINOPHIL NFR BLD AUTO: 0.8 %
ERYTHROCYTE [DISTWIDTH] IN BLOOD BY AUTOMATED COUNT: 13 % (ref 11.5–14.5)
FLUAV RNA RESP QL NAA+PROBE: NOT DETECTED
FLUBV RNA RESP QL NAA+PROBE: NOT DETECTED
GFR SERPL CREATININE-BSD FRML MDRD: >90 ML/MIN/1.73M*2
GLUCOSE SERPL-MCNC: 59 MG/DL (ref 74–99)
HCT VFR BLD AUTO: 43.1 % (ref 41–52)
HGB BLD-MCNC: 14.2 G/DL (ref 13.5–17.5)
IMM GRANULOCYTES # BLD AUTO: 0.02 X10*3/UL (ref 0–0.7)
IMM GRANULOCYTES NFR BLD AUTO: 0.3 % (ref 0–0.9)
LYMPHOCYTES # BLD AUTO: 0.91 X10*3/UL (ref 1.2–4.8)
LYMPHOCYTES NFR BLD AUTO: 11.8 %
MCH RBC QN AUTO: 31.4 PG (ref 26–34)
MCHC RBC AUTO-ENTMCNC: 32.9 G/DL (ref 32–36)
MCV RBC AUTO: 95 FL (ref 80–100)
MONOCYTES # BLD AUTO: 0.52 X10*3/UL (ref 0.1–1)
MONOCYTES NFR BLD AUTO: 6.7 %
NEUTROPHILS # BLD AUTO: 6.19 X10*3/UL (ref 1.2–7.7)
NEUTROPHILS NFR BLD AUTO: 79.9 %
NRBC BLD-RTO: 0 /100 WBCS (ref 0–0)
PLATELET # BLD AUTO: 334 X10*3/UL (ref 150–450)
PMV BLD AUTO: 8.9 FL (ref 7.5–11.5)
POTASSIUM SERPL-SCNC: 4 MMOL/L (ref 3.5–5.3)
RBC # BLD AUTO: 4.52 X10*6/UL (ref 4.5–5.9)
SARS-COV-2 RNA RESP QL NAA+PROBE: NOT DETECTED
SODIUM SERPL-SCNC: 137 MMOL/L (ref 136–145)
WBC # BLD AUTO: 7.7 X10*3/UL (ref 4.4–11.3)

## 2023-10-12 PROCEDURE — 80048 BASIC METABOLIC PNL TOTAL CA: CPT | Mod: CMCLAB

## 2023-10-12 PROCEDURE — 96361 HYDRATE IV INFUSION ADD-ON: CPT

## 2023-10-12 PROCEDURE — 99285 EMERGENCY DEPT VISIT HI MDM: CPT | Performed by: EMERGENCY MEDICINE

## 2023-10-12 PROCEDURE — 2500000002 HC RX 250 W HCPCS SELF ADMINISTERED DRUGS (ALT 637 FOR MEDICARE OP, ALT 636 FOR OP/ED): Mod: SE

## 2023-10-12 PROCEDURE — G0378 HOSPITAL OBSERVATION PER HR: HCPCS

## 2023-10-12 PROCEDURE — 99284 EMERGENCY DEPT VISIT MOD MDM: CPT | Performed by: NURSE PRACTITIONER

## 2023-10-12 PROCEDURE — 36415 COLL VENOUS BLD VENIPUNCTURE: CPT

## 2023-10-12 PROCEDURE — 87636 SARSCOV2 & INF A&B AMP PRB: CPT | Mod: CMCLAB

## 2023-10-12 PROCEDURE — 2500000001 HC RX 250 WO HCPCS SELF ADMINISTERED DRUGS (ALT 637 FOR MEDICARE OP): Mod: SE

## 2023-10-12 PROCEDURE — 2580000001 HC RX 258 IV SOLUTIONS: Mod: SE

## 2023-10-12 PROCEDURE — 85025 COMPLETE CBC W/AUTO DIFF WBC: CPT | Mod: CMCLAB

## 2023-10-12 PROCEDURE — 96360 HYDRATION IV INFUSION INIT: CPT

## 2023-10-12 PROCEDURE — S0109 METHADONE ORAL 5MG: HCPCS | Mod: SE

## 2023-10-12 RX ORDER — LIDOCAINE HYDROCHLORIDE 20 MG/ML
1.25 SOLUTION OROPHARYNGEAL ONCE
Status: COMPLETED | OUTPATIENT
Start: 2023-10-12 | End: 2023-10-12

## 2023-10-12 RX ORDER — METHADONE HYDROCHLORIDE 10 MG/1
10 TABLET ORAL ONCE
Status: COMPLETED | OUTPATIENT
Start: 2023-10-12 | End: 2023-10-12

## 2023-10-12 RX ADMIN — SODIUM CHLORIDE, POTASSIUM CHLORIDE, SODIUM LACTATE AND CALCIUM CHLORIDE 1000 ML: 600; 310; 30; 20 INJECTION, SOLUTION INTRAVENOUS at 20:57

## 2023-10-12 RX ADMIN — METHADONE HYDROCHLORIDE 10 MG: 10 TABLET ORAL at 20:57

## 2023-10-12 RX ADMIN — LIDOCAINE HYDROCHLORIDE 1.25 ML: 20 SOLUTION ORAL at 20:58

## 2023-10-12 ASSESSMENT — LIFESTYLE VARIABLES
HAVE YOU EVER FELT YOU SHOULD CUT DOWN ON YOUR DRINKING: NO
HAVE PEOPLE ANNOYED YOU BY CRITICIZING YOUR DRINKING: NO
EVER FELT BAD OR GUILTY ABOUT YOUR DRINKING: NO
EVER HAD A DRINK FIRST THING IN THE MORNING TO STEADY YOUR NERVES TO GET RID OF A HANGOVER: NO

## 2023-10-12 ASSESSMENT — PAIN SCALES - GENERAL: PAINLEVEL_OUTOF10: 7

## 2023-10-12 ASSESSMENT — COLUMBIA-SUICIDE SEVERITY RATING SCALE - C-SSRS
6. HAVE YOU EVER DONE ANYTHING, STARTED TO DO ANYTHING, OR PREPARED TO DO ANYTHING TO END YOUR LIFE?: NO
1. IN THE PAST MONTH, HAVE YOU WISHED YOU WERE DEAD OR WISHED YOU COULD GO TO SLEEP AND NOT WAKE UP?: NO
2. HAVE YOU ACTUALLY HAD ANY THOUGHTS OF KILLING YOURSELF?: NO

## 2023-10-12 ASSESSMENT — PAIN DESCRIPTION - LOCATION: LOCATION: MOUTH

## 2023-10-12 ASSESSMENT — PAIN - FUNCTIONAL ASSESSMENT: PAIN_FUNCTIONAL_ASSESSMENT: 0-10

## 2023-10-12 NOTE — ED TRIAGE NOTES
Ear pain, respiratory infection, and mouth pain. Pt states he is taking methadone currently. Hx mouth CA currently in remission.
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
## 2023-10-13 ENCOUNTER — TELEPHONE (OUTPATIENT)
Dept: PALLIATIVE MEDICINE | Facility: HOSPITAL | Age: 43
End: 2023-10-13
Payer: MEDICAID

## 2023-10-13 ENCOUNTER — TELEPHONE (OUTPATIENT)
Dept: HEMATOLOGY/ONCOLOGY | Facility: HOSPITAL | Age: 43
End: 2023-10-13
Payer: MEDICAID

## 2023-10-13 VITALS
OXYGEN SATURATION: 99 % | BODY MASS INDEX: 23.11 KG/M2 | WEIGHT: 156 LBS | DIASTOLIC BLOOD PRESSURE: 84 MMHG | HEART RATE: 79 BPM | SYSTOLIC BLOOD PRESSURE: 121 MMHG | TEMPERATURE: 97.9 F | HEIGHT: 69 IN | RESPIRATION RATE: 18 BRPM

## 2023-10-13 DIAGNOSIS — G89.3 NEOPLASM RELATED PAIN: Primary | ICD-10-CM

## 2023-10-13 DIAGNOSIS — C09.9 MALIGNANT NEOPLASM OF TONSIL (MULTI): Primary | ICD-10-CM

## 2023-10-13 PROCEDURE — G0378 HOSPITAL OBSERVATION PER HR: HCPCS

## 2023-10-13 PROCEDURE — S0109 METHADONE ORAL 5MG: HCPCS | Mod: SE

## 2023-10-13 PROCEDURE — 2500000002 HC RX 250 W HCPCS SELF ADMINISTERED DRUGS (ALT 637 FOR MEDICARE OP, ALT 636 FOR OP/ED): Mod: SE

## 2023-10-13 PROCEDURE — 2500000001 HC RX 250 WO HCPCS SELF ADMINISTERED DRUGS (ALT 637 FOR MEDICARE OP): Mod: SE

## 2023-10-13 PROCEDURE — 96360 HYDRATION IV INFUSION INIT: CPT

## 2023-10-13 PROCEDURE — 96361 HYDRATE IV INFUSION ADD-ON: CPT

## 2023-10-13 RX ORDER — AMOXICILLIN 250 MG
2 CAPSULE ORAL 2 TIMES DAILY
Status: DISCONTINUED | OUTPATIENT
Start: 2023-10-13 | End: 2023-10-13 | Stop reason: HOSPADM

## 2023-10-13 RX ORDER — METHADONE HYDROCHLORIDE 10 MG/1
10 TABLET ORAL ONCE
Status: COMPLETED | OUTPATIENT
Start: 2023-10-13 | End: 2023-10-13

## 2023-10-13 RX ORDER — TALC
6 POWDER (GRAM) TOPICAL NIGHTLY PRN
Status: DISCONTINUED | OUTPATIENT
Start: 2023-10-13 | End: 2023-10-13 | Stop reason: HOSPADM

## 2023-10-13 RX ORDER — METHADONE HYDROCHLORIDE 5 MG/1
15 TABLET ORAL EVERY 8 HOURS
Qty: 45 TABLET | Refills: 0 | Status: SHIPPED | OUTPATIENT
Start: 2023-10-13 | End: 2023-10-18

## 2023-10-13 RX ADMIN — SENNOSIDES AND DOCUSATE SODIUM 2 TABLET: 50; 8.6 TABLET ORAL at 05:47

## 2023-10-13 RX ADMIN — METHADONE HYDROCHLORIDE 10 MG: 10 TABLET ORAL at 09:28

## 2023-10-13 RX ADMIN — Medication 6 MG: at 05:48

## 2023-10-13 ASSESSMENT — PAIN SCALES - GENERAL: PAINLEVEL_OUTOF10: 8

## 2023-10-13 ASSESSMENT — PAIN DESCRIPTION - PAIN TYPE: TYPE: CHRONIC PAIN

## 2023-10-13 ASSESSMENT — PAIN - FUNCTIONAL ASSESSMENT: PAIN_FUNCTIONAL_ASSESSMENT: 0-10
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
## 2023-10-13 NOTE — ED PROVIDER NOTES
Disposition Note  CDU    Subjective:    Mr. Messer is a 42-year-old male who has undergone comprehensive diagnostic evaluation and therapeutic management in accordance with the CDU guidelines for mouth pain and need for social work consultation. Based on his clinical response and diagnostic information during this period of observation, it has been determined that the patient will be discharged home.      The acute evaluation included:  Laboratory studies all of which were unremarkable.  Additionally social work spoke to social work spoke with the patient regarding getting him back to his prior apartment, scheduling for for outpatient appointments/follow-up and issues regarding his prescribed methadone.      Placed in observation at: 10/12/2023 2355      Physical Exam      GENERAL:  The patient appears nourished and normally developed. Vital signs as documented.     HEENT:  Head normocephalic, atraumatic, EOMs intact, PERRLA, Mucous membranes moist. Nares patent without copious rhinorrhea.  No lymphadenopathy. Poor dentition noted.    PULMONARY:  Lungs are clear to auscultation, without any respiratory distress. Able to speak full sentences, no accessory muscle use    CARDIAC:   Normal rate. No murmurs, rubs or gallops    ABDOMEN:  Soft, non-distended, non-tender, BS positive x 4 quadrants, No rebound or guarding, no peritoneal signs, no CVA tenderness, no masses or organomegaly    : External exam unremarkable, speculum exam with no discharge, no bleeding, no adnexal tenderness or masses    MUSCULOSKELETAL:   Able to ambulate, Non edematous, with no obvious deformities. Pulses intact distal    SKIN:   Good color, with no significant rashes.  No pallor.    NEURO:  No obvious neurological deficits, normal sensation and strength bilaterally.  Able to follow commands, NIH 0, CN 2-12 intact.    Psych: Appropriate mood and affect    Impression and Plan    In summary, Mr. Messer has been cared for according to the standard  [facility] Clinical Decision Unit observation protocol for mouth pain and need for social work. This extended period of observation was specifically required to determine the need for hospitalization. Prior to discharge from observation, the final physical exam is documented above.     Significant events during the course of observation based on the goals of the clinical problem list include:   1) improved pain  2) outpatient living and medications    Based on the patient´s condition and test results, the patient will be discharged.    Total length of observation was 13 hours. Dr. Mar is the CDU disposition attending.      Discharge Diagnosis  Mouth pain  Cancer  Encounter for Social Work Evaluation    Issues Requiring Follow-Up  Mouth pain/cancer - Dr. Burkitt APPT scheduled 10/18 120 PM  Methadone prescription - outpatient /oncology to follow up with patient regarding prescription    Discharge Meds  none    Test Results Pending At Discharge  none    Hospital Course  stable    Outpatient Follow-Up  Dr. Burkitt 10/18/2023 120 PM  Supportive Oncology 2 PM    Isabel Rodriguez FNP-BC, AGAC-[facility]  Center for Emergency Medicine      SINDY Sánchez-CNP  10/13/23 1325       SINDY Sánchez-CNP  10/13/23 6645

## 2023-10-13 NOTE — PROGRESS NOTES
I have accept care of this patient in signout.    In summary:  This patient was signed out to me pending reevaluation and sobriety.  Upon my evaluation patient is now sober and would like to go home.  He was obtained return precautions at this time he is safe for discharge.      Labs Reviewed   CBC WITH AUTO DIFFERENTIAL - Abnormal       Result Value    WBC 8.2      nRBC 0.0      RBC 3.91 (*)     Hemoglobin 12.7 (*)     Hematocrit 37.5 (*)     MCV 96      MCH 32.5      MCHC 33.9      RDW 13.5      Platelets 163      MPV 8.4      Neutrophils % 80.9      Immature Granulocytes %, Automated 0.5      Lymphocytes % 6.1      Monocytes % 12.1      Eosinophils % 0.2      Basophils % 0.2      Neutrophils Absolute 6.60      Immature Granulocytes Absolute, Automated 0.04      Lymphocytes Absolute 0.50 (*)     Monocytes Absolute 0.99      Eosinophils Absolute 0.02      Basophils Absolute 0.02     COMPREHENSIVE METABOLIC PANEL - Abnormal    Glucose 65 (*)     Sodium 134 (*)     Potassium 3.4 (*)     Chloride 96 (*)     Bicarbonate 24      Anion Gap 17      Urea Nitrogen 14      Creatinine 0.76      eGFR >90      Calcium 9.1      Albumin 4.0      Alkaline Phosphatase 74      Total Protein 7.3      AST 64 (*)     Bilirubin, Total 1.1      ALT 29     ACUTE TOXICOLOGY PANEL, BLOOD - Normal    Acetaminophen <10.0      Salicylate  <3      Alcohol <10       No orders to display
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
## 2023-10-15 NOTE — ED PROVIDER NOTES
HPI   Chief Complaint   Patient presents with    Psychiatric Evaluation       Patient presents initially as requesting a psychiatric evaluation.  He states that he does not like where he is living now.  He denies any suicidal homicidal ideation.                          No data recorded                Patient History   Past Medical History:   Diagnosis Date    Bipolar 1 disorder (CMS/HCC)     Immunization not carried out for unspecified reason     COVID-19 vaccination not done    Other specified abnormal findings of blood chemistry     High serum cholestanol     No past surgical history on file.  No family history on file.  Social History     Tobacco Use    Smoking status: Some Days     Types: Cigarettes    Smokeless tobacco: Not on file   Substance Use Topics    Alcohol use: Never    Drug use: Yes     Types: Marijuana       Physical Exam   ED Triage Vitals [10/04/23 0413]   Temp Heart Rate Resp BP   36.8 °C (98.3 °F) 110 20 --      SpO2 Temp Source Heart Rate Source Patient Position   98 % Temporal -- Sitting      BP Location FiO2 (%)     Left arm --       Physical Exam  Vitals and nursing note reviewed.   Constitutional:       General: He is not in acute distress.     Appearance: He is well-developed.   HENT:      Head: Normocephalic and atraumatic.   Eyes:      Conjunctiva/sclera: Conjunctivae normal.   Cardiovascular:      Rate and Rhythm: Normal rate and regular rhythm.      Heart sounds: No murmur heard.  Pulmonary:      Effort: Pulmonary effort is normal. No respiratory distress.      Breath sounds: Normal breath sounds.   Abdominal:      Palpations: Abdomen is soft.      Tenderness: There is no abdominal tenderness.   Musculoskeletal:         General: No swelling.      Cervical back: Neck supple.   Skin:     General: Skin is warm and dry.      Capillary Refill: Capillary refill takes less than 2 seconds.   Neurological:      Mental Status: He is alert.   Psychiatric:         Mood and Affect: Mood normal.          ED Course & MDM   Diagnoses as of 10/14/23 2129   Mental health assessment declined       Medical Decision Making  While patient was waiting in the emergency department he stated he does not want to stay here any longer.  He feels safe leaving.  He is not clinically intoxicated.  He is not suicidal or homicidal.  Prior medical records were reviewed.  Patient was discharged instructed follow-up with his mental health provider.        Procedure  Procedures     Russ Giraldo MD  10/14/23 2133

## 2023-10-17 ENCOUNTER — NUTRITION (OUTPATIENT)
Dept: HEMATOLOGY/ONCOLOGY | Facility: HOSPITAL | Age: 43
End: 2023-10-17
Payer: MEDICAID

## 2023-10-17 NOTE — PROGRESS NOTES
"NUTRITION COMMUNICATION NOTE    Shayne Messer     REASON FOR COMMUNICATION:     Patient had called requesting a shipment of his ONS  [facility] was called they are out of all PLUS products at this time with long delays  I will look to change to Boost VHC- 530 calories and 22 g protein  Email out to manager if they can take a verbal on this or if I have to resubmit orders as I was told over the phone     Additionally his new address is:   [address] [place] [address] [id number]  [place] [id number]      Lab Results   Component Value Date/Time    GLUCOSE 59 (L) 10/12/2023 2036     10/12/2023 2036    K 4.0 10/12/2023 2036    CL 98 10/12/2023 2036    CO2 27 10/12/2023 2036    ANIONGAP 16 10/12/2023 2036    BUN 17 10/12/2023 2036    CREATININE 0.74 10/12/2023 2036    EGFR >90 10/12/2023 2036    CALCIUM 9.5 10/12/2023 2036    ALBUMIN 4.0 10/04/2023 0523    ALKPHOS 74 10/04/2023 0523    PROT 7.3 10/04/2023 0523    AST 64 (H) 10/04/2023 0523    BILITOT 1.1 10/04/2023 0523    ALT 29 10/04/2023 0523     No results found for: \"VITD25\"    Anthropometrics:           Wt Readings from Last 10 Encounters:   10/12/23 70.8 kg (156 lb)   10/04/23 71 kg (156 lb 8.4 oz)   10/01/23 68 kg (150 lb)   09/30/23 70.3 kg (155 lb)   07/19/23 72.4 kg (159 lb 11.2 oz)   06/29/23 73.3 kg (161 lb 9.6 oz)   05/17/23 80.7 kg (178 lb)   03/23/23 80.8 kg (178 lb 2.1 oz)   03/23/23 81.9 kg (180 lb 8.9 oz)   03/08/23 82.6 kg (182 lb 1 oz)                       "

## 2023-10-18 ENCOUNTER — APPOINTMENT (OUTPATIENT)
Dept: HEMATOLOGY/ONCOLOGY | Facility: HOSPITAL | Age: 43
End: 2023-10-18
Payer: MEDICAID

## 2023-10-23 ENCOUNTER — TELEPHONE (OUTPATIENT)
Dept: HEMATOLOGY/ONCOLOGY | Facility: HOSPITAL | Age: 43
End: 2023-10-23
Payer: MEDICAID

## 2023-10-26 ENCOUNTER — SOCIAL WORK (OUTPATIENT)
Dept: HEMATOLOGY/ONCOLOGY | Facility: HOSPITAL | Age: 43
End: 2023-10-26
Payer: MEDICAID

## 2023-10-26 NOTE — PROGRESS NOTES
Covering  LISW was informed on 10/13 that pt missed his follow up appointments with oncology and palliative care due to lack of transportation. Patient had previously reported to LIS that he had transportation for this appointment. Pt reported he did not currently have a phone and requested LISW reach him on his friend's phone ([phone]). Patient is rescheduled for 11/1. LISW has attempted to reach patient on 10/13,  10/17, 10/20, 10/24 & 10/26, however, the number is disconnected. [facility] has attempted additional phone numbers in chart with no success. LISW will continue to attempt to reach patient and arrange transportation for his appointments.     If patient calls in, please provide [facility] phone number ([phone])

## 2023-10-31 NOTE — PROGRESS NOTES
Covering CHRISTINEW still unable to reach patient via telephone to arrange transportation. Roundtrip ride was arranged for a 10am pickup at his new home address and his most recent phone number: [phone]    LISW will continue to follow as needed.

## 2023-11-01 ENCOUNTER — APPOINTMENT (OUTPATIENT)
Dept: HEMATOLOGY/ONCOLOGY | Facility: HOSPITAL | Age: 43
End: 2023-11-01
Payer: MEDICAID

## 2023-11-01 ENCOUNTER — APPOINTMENT (OUTPATIENT)
Dept: PALLIATIVE MEDICINE | Facility: HOSPITAL | Age: 43
End: 2023-11-01
Payer: MEDICAID

## 2023-11-01 ENCOUNTER — TELEPHONE (OUTPATIENT)
Dept: SCHEDULING | Age: 43
End: 2023-11-01
Payer: MEDICAID

## 2023-11-01 DIAGNOSIS — Z85.89 H/O MALIGNANT NEOPLASM OF HEAD AND NECK: Primary | ICD-10-CM

## 2023-11-01 DIAGNOSIS — C76.0 HEAD AND NECK CANCER (MULTI): Primary | ICD-10-CM

## 2023-11-01 NOTE — TELEPHONE ENCOUNTER
Pt called in to schedule PET scan so he can follow up with Dr. Burkitt. I don't see any order for PET scan/any other orders for scans. Please advise once orders are placed. Thank you!

## 2023-11-06 ENCOUNTER — APPOINTMENT (OUTPATIENT)
Dept: PALLIATIVE MEDICINE | Facility: HOSPITAL | Age: 43
End: 2023-11-06
Payer: MEDICAID

## 2023-11-06 ENCOUNTER — NUTRITION (OUTPATIENT)
Dept: HEMATOLOGY/ONCOLOGY | Facility: HOSPITAL | Age: 43
End: 2023-11-06
Payer: MEDICAID

## 2023-11-06 NOTE — TELEPHONE ENCOUNTER
ALEC contacted pt and confirmed his ride was arranged via roundtrip for his appointments on 11/9. His pick-up time is 12:45pm. Pt agreeable to plan. Patient denies any further psychosocial or support service needs at this time.

## 2023-11-06 NOTE — PROGRESS NOTES
NUTRITION COMMUNICATION NOTE    Shayne Messer     REASON FOR COMMUNICATION:     Patient had called about delivery of his nutritional supplements  He reports he has a new number that is [phone]    [facility] was called prior to calling him back.  He is not due for a re-order for 13 days more days  Cannot put in for delivery until 11-.    Wt Readings from Last 10 Encounters:   10/12/23 70.8 kg (156 lb)   10/04/23 71 kg (156 lb 8.4 oz)   10/01/23 68 kg (150 lb)   09/30/23 70.3 kg (155 lb)   07/19/23 72.4 kg (159 lb 11.2 oz)   06/29/23 73.3 kg (161 lb 9.6 oz)   05/17/23 80.7 kg (178 lb)   03/23/23 80.8 kg (178 lb 2.1 oz)   03/23/23 81.9 kg (180 lb 8.9 oz)   03/08/23 82.6 kg (182 lb 1 oz)     Was speaking with patient-   He is requesting   Boost VHC would prefer vanilla and strawberry or just strawberry.   He would like 4 per day to provide 2120 calories and 88 g protein  Patient has been constant with ordering.  He would like to gain weight  Will move forward with increase in order.  KAREN dean to Dr. Burkitt  He is due to be in clinic this week.  Will follow as able               Time Spent  Prep time on day of patient encounter: 5 minutes  Time spent directly with patient, family or caregiver: 10 minutes  Additional Time Spent on Patient Care Activities: 10 minutes  Documentation Time: 10 minutes  Other Time Spent: 0 minutes  Total: 35 minutes

## 2023-11-07 ENCOUNTER — TELEPHONE (OUTPATIENT)
Dept: HEMATOLOGY/ONCOLOGY | Facility: HOSPITAL | Age: 43
End: 2023-11-07
Payer: MEDICAID

## 2023-11-08 ENCOUNTER — APPOINTMENT (OUTPATIENT)
Dept: HEMATOLOGY/ONCOLOGY | Facility: HOSPITAL | Age: 43
End: 2023-11-08
Payer: MEDICAID

## 2023-11-09 ENCOUNTER — TELEPHONE (OUTPATIENT)
Dept: HEMATOLOGY/ONCOLOGY | Facility: HOSPITAL | Age: 43
End: 2023-11-09

## 2023-11-09 ENCOUNTER — APPOINTMENT (OUTPATIENT)
Dept: LAB | Facility: HOSPITAL | Age: 43
End: 2023-11-09
Payer: MEDICAID

## 2023-11-09 ENCOUNTER — OFFICE VISIT (OUTPATIENT)
Dept: PALLIATIVE MEDICINE | Facility: HOSPITAL | Age: 43
End: 2023-11-09
Payer: MEDICAID

## 2023-11-09 ENCOUNTER — OFFICE VISIT (OUTPATIENT)
Dept: HEMATOLOGY/ONCOLOGY | Facility: HOSPITAL | Age: 43
End: 2023-11-09
Payer: MEDICAID

## 2023-11-09 VITALS
DIASTOLIC BLOOD PRESSURE: 78 MMHG | RESPIRATION RATE: 18 BRPM | OXYGEN SATURATION: 100 % | SYSTOLIC BLOOD PRESSURE: 121 MMHG | HEIGHT: 69 IN | TEMPERATURE: 97.2 F | HEART RATE: 87 BPM | BODY MASS INDEX: 22.3 KG/M2 | WEIGHT: 150.57 LBS

## 2023-11-09 DIAGNOSIS — Z79.891 ENCOUNTER FOR MONITORING OPIOID MAINTENANCE THERAPY: Primary | ICD-10-CM

## 2023-11-09 DIAGNOSIS — Z51.81 ENCOUNTER FOR MONITORING OPIOID MAINTENANCE THERAPY: Primary | ICD-10-CM

## 2023-11-09 DIAGNOSIS — G89.3 NEOPLASM RELATED PAIN: ICD-10-CM

## 2023-11-09 DIAGNOSIS — Z51.5 ENCOUNTER FOR PALLIATIVE CARE: ICD-10-CM

## 2023-11-09 DIAGNOSIS — C76.0 HEAD AND NECK CANCER (MULTI): Primary | ICD-10-CM

## 2023-11-09 LAB
AMPHETAMINES UR QL SCN: ABNORMAL
BARBITURATES UR QL SCN: ABNORMAL
BENZODIAZ UR QL SCN: ABNORMAL
BZE UR QL SCN: ABNORMAL
CANNABINOIDS UR QL SCN: ABNORMAL
FENTANYL+NORFENTANYL UR QL SCN: ABNORMAL
OPIATES UR QL SCN: ABNORMAL
OXYCODONE+OXYMORPHONE UR QL SCN: ABNORMAL
PCP UR QL SCN: ABNORMAL

## 2023-11-09 PROCEDURE — 80307 DRUG TEST PRSMV CHEM ANLYZR: CPT | Performed by: NURSE PRACTITIONER

## 2023-11-09 PROCEDURE — 99417 PROLNG OP E/M EACH 15 MIN: CPT | Performed by: NURSE PRACTITIONER

## 2023-11-09 PROCEDURE — 3008F BODY MASS INDEX DOCD: CPT | Performed by: NURSE PRACTITIONER

## 2023-11-09 PROCEDURE — 99215 OFFICE O/P EST HI 40 MIN: CPT | Performed by: INTERNAL MEDICINE

## 2023-11-09 PROCEDURE — 3078F DIAST BP <80 MM HG: CPT | Performed by: INTERNAL MEDICINE

## 2023-11-09 PROCEDURE — 99215 OFFICE O/P EST HI 40 MIN: CPT | Performed by: NURSE PRACTITIONER

## 2023-11-09 PROCEDURE — 3008F BODY MASS INDEX DOCD: CPT | Performed by: INTERNAL MEDICINE

## 2023-11-09 PROCEDURE — 3074F SYST BP LT 130 MM HG: CPT | Performed by: INTERNAL MEDICINE

## 2023-11-09 PROCEDURE — 80349 CANNABINOIDS NATURAL: CPT | Performed by: NURSE PRACTITIONER

## 2023-11-09 PROCEDURE — 3078F DIAST BP <80 MM HG: CPT | Performed by: NURSE PRACTITIONER

## 2023-11-09 PROCEDURE — 3074F SYST BP LT 130 MM HG: CPT | Performed by: NURSE PRACTITIONER

## 2023-11-09 RX ORDER — PILOCARPINE HYDROCHLORIDE 5 MG/1
5 TABLET, FILM COATED ORAL 3 TIMES DAILY PRN
Qty: 90 TABLET | Refills: 1 | Status: SHIPPED | OUTPATIENT
Start: 2023-11-09 | End: 2023-12-07 | Stop reason: SDUPTHER

## 2023-11-09 RX ORDER — NICOTINE POLACRILEX 4 MG/1
4 LOZENGE ORAL EVERY 2 HOUR PRN
Qty: 100 LOZENGE | Refills: 3 | Status: SHIPPED | OUTPATIENT
Start: 2023-11-09 | End: 2023-11-10 | Stop reason: SDUPTHER

## 2023-11-09 RX ORDER — METHADONE HYDROCHLORIDE 10 MG/1
10 TABLET ORAL EVERY 8 HOURS
Qty: 45 TABLET | Refills: 0 | Status: SHIPPED | OUTPATIENT
Start: 2023-11-09 | End: 2023-11-22 | Stop reason: SDUPTHER

## 2023-11-09 RX ORDER — METHADONE HYDROCHLORIDE 5 MG/1
5 TABLET ORAL EVERY 8 HOURS
Qty: 45 TABLET | Refills: 0 | Status: SHIPPED | OUTPATIENT
Start: 2023-11-09 | End: 2023-11-15 | Stop reason: WASHOUT

## 2023-11-09 ASSESSMENT — PAIN SCALES - GENERAL: PAINLEVEL: 7

## 2023-11-09 NOTE — TELEPHONE ENCOUNTER
Patient coming for visit today with Dr. Burkitt. Will follow-up on this with patient when he is here today.

## 2023-11-09 NOTE — PROGRESS NOTES
SUPPORTIVE AND PALLIATIVE ONCOLOGY OUTPATIENT FOLLOW-UP      SERVICE DATE: 11/9/2023    Subjective   HISTORY OF PRESENT ILLNESS: Shayne Messer is a 42 y.o. male who presents with depression with suicidal attempts, bipolar disorder, tobacco abuse, illicit drug abuse, and diagnosis of Tonsillar Squamous Cell Carcinoma. Patient is following with Dr. Burkitt as primary oncologist. Patient has been referred to Supportive Oncology/Palliative Care for neoplasm related pain and further symptom management.      Pain Assessment:  Pain Score:  7  Location:  mouth/teeth  Description:  throbbing, sharp    Symptom Assessment:  Pain:very much  - great mount of pain in his mouth with mucositis and rawness still in throat and tongue. Now with many broken teeth. States over the past few months his teeth have been cracking and breaking and he cannot chew anything at all. He missed his last appt with [facility]. He also has been out of his Methadone because it was stolen. He notes he filed a police report through [employer]. For pain has been trying to taking Tylenol and Advil with no assistance of pain.  Numbness or Tingling in hands/feet/other: a little - jaw, but notes this to be tolerable  Sore Muscles/Spasms: none  Headache: none  Dizziness:none  Constipation: a little - notes while on Methadone but uses Miralax with good relief  Diarrhea: none  Nausea: none  Vomiting: none  Lack of Appetite: none   Weight Loss: a little - notes he feels hungry but because of his pain and his teeth he can hardly eat  Taste changes: none  Dry Mouth: somewhat - mouth is very dry, notes he cannot afford to by Biotene over the counter and the pharmacy will not give it to him  Pain in Mouth/Swallowing: somewhat - as above  Lack of Energy: none  Difficulty Sleeping: a little - due to pain  Worrying: none  Anxiety: none  Depression: none  Shortness of breath: none  Other: none      Information obtained from: interview of  "patient  ______________________________________________________________________        Objective     PHYSICAL EXAMINATION   Vital Signs:   Vital signs reviewed  Visit Vitals  /78 (BP Location: Left arm, Patient Position: Sitting, BP Cuff Size: Adult)   Pulse 87   Temp 36.2 °C (97.2 °F) (Temporal)   Resp 18   Ht 1.761 m (5' 9.33\")   Wt 68.3 kg (150 lb 9.2 oz)   SpO2 100%   BMI 22.02 kg/m²   Smoking Status Some Days   BSA 1.83 m²     Pain Score: 7       Physical Exam  Constitutional:       Appearance: Normal appearance.   HENT:      Mouth/Throat:      Comments: Multiple decaying and broken teeth  Cardiovascular:      Rate and Rhythm: Normal rate and regular rhythm.      Heart sounds: Normal heart sounds.   Pulmonary:      Effort: Pulmonary effort is normal.      Breath sounds: Normal breath sounds.   Abdominal:      General: Abdomen is flat. Bowel sounds are normal.      Palpations: Abdomen is soft.   Musculoskeletal:         General: Normal range of motion.   Neurological:      Mental Status: He is alert and oriented to person, place, and time.   Psychiatric:         Mood and Affect: Mood normal.         Behavior: Behavior normal.         ASSESSMENT/PLAN    Pain  Pain is: chronic after cancer treatment  Type: somatic and neuropathic  Pain control: sub-optimally controlled  Home regimen:   - Continue Acetaminophen 1000mg [id number] PRN  - Restart Methadone 15mg [id number]   - EKG: (6/15/23) QTc 455 - Next Visit  - Restart BMX 10mg QID PRN   - Viscous Lidocaine 2% - declines - caused patient to vomit due to taste  - Morphine - patient went to detox and taken off medication  - 4/20/23: Patient admitted to doing Meth, no further opioids at this time  - Referral to [facility] (9/14/2023) - never scheduled  - [facility] [employer] [facility] [facility] - needs to make follow-up     Opioid Use  Medication Management:   - OARRS report reviewed with no aberrant behavior; consistent with [facility] " prescriptions/records and patient history  - .  Overdose Risk Score 110. This has been discussed with patient.   - We will continue to closely monitor the patient for signs of prescription misuse including UDS, OARRS review and subjective reports at each visit.  - No concurrent benzodiazepine use   - I am a provider who either is or has consulted and collaborated with a provider certified in Hospice and Palliative Medicine and have conducted a face-face visit and examination for this patient.  - Routine Urine Drug Screen: 1/11/23 appropriately negative for illicit substances.  4/20/23 + for cannabis and Methadone. 6/29/23 appropriately + for prescribed medications and  - for illicits. Pending Results 11/9/23  - Controlled Substance Agreement: 1/11/23  - Specifically discussed that controlled substance prescriptions will only be provided by our group as outlined in the completed agreement  - Prescribed naloxone: declined 1/11/23  - Red Flags: hx of cocaine abuse; hx of suicidal attempts    Constipation  At risk for constipation related to opioids.  Usual bowel pattern: daily  Home regimen:   - Continue Senna 2 tabs 1-2x per day PRN  - Continue Miralax 17grams 1-2x per day PRN    Decreased Appetite  Related to  pain/treatment hx  Nutrition following - Lora Sanderson RDN  Home regimen:    - Continue to monitor  - Continue protein supplements    Xerostomia  - Continue Biotene Rinses 15mL QID PRN    Supportive and Palliative Oncology Encounter:  Emotional support provided  Coordination of care  Will continue to follow and address symptoms as needed    Advance Directives  Code Status: Full code    Next Follow-Up Visit:  Return to clinic in 1 month    Signature and billing  Medical complexity was moderate level due to due to complexity of problems, extensive data review, and high risk of management/treatment.  Time was spent on the following: Prep Time, Time Directly with Patient/Family/Caregiver, Documentation Time.  Total time spent: 35    Data    Some elements copied from Supportive Oncology note on 9/14/23, the elements have been updated and all reflect current decision making from today, 11/9/2023.      Plan of Care discussed with: Patient    SIGNATURE: REED Peralta    Contact information:  Supportive and Palliative Oncology  Monday-Friday 8 AM-5 PM  Phone:  [phone], press option #5, then option #1.   Or Epic Secure Chat

## 2023-11-09 NOTE — PROGRESS NOTES
Patient ID: Shayne Messer is a 42 y.o. male.    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 Kyunghee Burkitt  Rad/onc: Dr. Valdez  ENT: Dr Jerome  Treatment:  - cisplatin 100mg/m2 q3wk with RT     Oncology history   - 11/11/2022: enlarging right tonsillar mass and RT neck mass x 6 months  - 11/22/2022: triple scope, Enlarged and indurated right tonsil with extension of abnormal appearing tissue onto the soft palate. No extension to BOT and no separate lesions or masses identified; biopsy of right tonsillar mass: invasive p16+ SCC       Past Medical History:   Past Medical History:  No date: Bipolar 1 disorder (CMS/HCC)  No date: Immunization not carried out for unspecified reason      Comment:  COVID-19 vaccination not done  No date: Other specified abnormal findings of blood chemistry      Comment:  High serum cholestanol   Surgical History:    No past surgical history on file.   Family History:    No family history on file.  Family Oncology History:    Cancer-related family history is not on file.  Social History:    Social History     Tobacco Use    Smoking status: Some Days     Types: Cigarettes   Substance Use Topics    Alcohol use: Never    Drug use: Yes     Types: Marijuana          Subjective   Chief Complaint:     HPI  -He reports  his mouth pain is still bothering quite a bit, currently on Methadone  -He lost more wt since last visit.  He saw supp onc today.  -He has been having URI, no cough or SOB.    -He reports having 3 weeks hx of clear discharge from both ear canal, can't hear very well,denies fever chills recently.  -He still has sever dry mouth, hard to chew.    -He also noticed teeth are rotten, lots of broken teeth, he will need to see dentist.  He can only take soft diet.  -Denies dysphagia, SOB, fever, chills.      PmHx:  Depression with suicidal ideation in the past requiring hospital admission.  Nothing that he reports in the last  6 months.    Cocaine abuse (snort), currently sober.  Daily smoker.   Self-reported ADHD.       ROS  Review of Systems - Oncology    Allergies  No Known Allergies     Medications  Current Outpatient Medications   Medication Instructions    amphetamine-dextroamphetamine (Adderall) 30 mg tablet 30 mg, oral, 2 times daily    atorvastatin (LIPITOR) 10 mg, oral, Daily RT    carBAMazepine XR (TEGRETOL  XR) 400 mg, oral, Daily RT    ergocalciferol (VITAMIN D-2) 50,000 Units, oral, Weekly    FLUoxetine (PROZAC) 40 mg, oral, Every morning    hydrOXYzine pamoate (VISTARIL) 50 mg, oral, 2 times daily PRN    lisinopriL-hydrochlorothiazide 20-12.5 mg tablet 1 tablet, oral, Daily RT    lisinopril 10 mg, oral, 2 times daily    nicotine polacrilex (COMMIT) 4 mg, Allow 1 lozenge to dissolve slowly in mouth as directed    omeprazole (PRILOSEC) 40 mg, oral, Daily RT    polyethylene glycol 3350 (MIRALAX ORAL) oral    prazosin (MINIPRESS) 2 mg, oral, Nightly    QUEtiapine (SEROQUEL) 600 mg, oral, Nightly    sertraline (Zoloft) 100 mg tablet 2 tablets, oral, Daily    traZODone (DESYREL) 100 mg, oral, Daily PRN          Objective   VS: There were no vitals taken for this visit.  Weight: Daily Weight  10/12/23 : 70.8 kg (156 lb)  10/04/23 : 71 kg (156 lb 8.4 oz)  10/01/23 : 68 kg (150 lb)  09/30/23 : 70.3 kg (155 lb)  07/19/23 : 72.4 kg (159 lb 11.2 oz)  06/29/23 : 73.3 kg (161 lb 9.6 oz)  05/17/23 : 80.7 kg (178 lb)      Physical Exam  Constitutional:       Appearance: Normal appearance.   HENT:      Head: Normocephalic and atraumatic.      Mouth/Throat:      Mouth: Mucous membranes are moist.      Comments: Multiple rotten and broken bottom teeth.  Eyes:      Extraocular Movements: Extraocular movements intact.      Pupils: Pupils are equal, round, and reactive to light.   Cardiovascular:      Rate and Rhythm: Normal rate and regular rhythm.   Pulmonary:      Effort: Pulmonary effort is normal.      Breath sounds: Normal breath sounds.    Musculoskeletal:         General: Normal range of motion.      Cervical back: Normal range of motion and neck supple.   Skin:     General: Skin is warm.   Neurological:      General: No focal deficit present.      Mental Status: He is alert and oriented to person, place, and time.         Diagnostic Results   Labs  Below labs are reviewed today.                         Images       Pathology  Lab Results   Component Value Date    PATHREP  11/22/2022     Name BABAK GROSS                                                                                                   Accession #: [id number]            Pathologist:                   WADAD S. MNEIMNEH, MD  Date of Procedure:    11/22/2022  Date Received:          11/22/2022  Date Reported           11/28/2022  Submitting Physician:   OLGA KOEHLER MD  Location:                    OR  Other External #                                                                    FINAL DIAGNOSIS  A.  RIGHT TONSIL, BIOPSY:    -- INVASIVE P16-POSITIVE SQUAMOUS CELL CARCINOMA, MINIMALLY REPRESENTED (SEE  NOTE).    Note: Minute foci of nonkeratinizing squamous cell carcinoma are noted,  strongly diffusely positive for p16 immunostain.    P16 by immunohistochemistry:  Positive    Reference Range:  Negative:  <50% strong nuclear and cytoplasmic invasive tumor cell staining  Equivocal: 50-70% strong nuclear and cytoplasmic invasive tumor cell staining   Positive: >70% strong nuclear and cytoplasmic invasive tumor cell staining     Findings received by Dr Murphy on Nov 24, 2022, at 6:50 AM    B.  SUPERIOR RIGHT TONSIL, BIOPSY:  -- SQUAMOUS MUCOSA AND ASSOCIATED LYMPHOID TISSUE WITH FOLLICULAR HYPERPLASIA.   -- NO CARCINOMA IDENTIFIED.    Note: Immunostain for p16 performed on block B1 shows focal, patchy, weak  nonspecific staining in the squamous epithelium.       C.  RIGHT TONSIL, BIOPSY # 1:    -- SQUAMOUS MUCOSA AND ASSOCIATED LYMPHOID TISSUE WITH FOLLICULAR HYPERPLASIA  (SEE  NOTE).  -- NO CARCINOMA IDENTIFIED.    Note: Multiple levels were examined.    D.  RIGHT TONSIL BIOPSY # 2:    -- SQUAMOUS MUCOSA WITH FOCAL LYMPHOID TISSUE.  -- NO CARCINOMA IDENTIFIED.                                                                                                                                                                                                                                                                                                                                                                                                                                                                                       Electronically Signed Out By WADAD S. MNEIMNEH, MD/DUNG  By the signature on this report, the individual or group listed as making the  Final Interpretation/Diagnosis certifies that they have reviewed this case.  Diagnostic interpretation performed at [facility] [address] [facility] [address]         Intraoperative Consultation:  A: RIGHT TONSIL  BIOPSY    Frozen Section 1:  Date Ordered: 11/22/2022 08:59     Date Received: 11/22/2022 08:59     Date  Called: 11/22/2022 09:38    Intraoperative Diagnosis:  A) Right tonsil biopsy (FSA1): Small detached fragment of atypical cells,  suspicious for carcinoma but not diagnostic; requested additional tissue.    : Dr. Mckenzie Cornejo    Intraoperative Consult Pathologist(s):  VENECIA KEYS MD (P)    B: SUPERIOR RIGHT TONSIL    Frozen Section 1:  Date Ordered: 11/22/2022 09:46     Date Received: 11/22/2022 09:46     Date  Called: 11/22/2022 10:02    Intraoperative Diagnosis:  B) Superior right tonsil (FSB1): Benign tonsillar tissue.  Intraoperative Consult Pathologist(s):  VENECIA KEYS MD (P)      [facility]/11/22/2022           Clinical History:  Squamous cell carcinoma    Specimens Submitted As:  A: RIGHT TONSIL  BIOPSY   B: SUPERIOR RIGHT TONSIL   C: RIGHT TONSIL BIOPSY # 1   D: RIGHT  "TONSIL BIOPSY # 2     Gross Description:  A. Received fresh for intraoperative consultation, labeled with the patient's  name and hospital number and \"right tonsil biopsy\", are multiple tan-red soft  tissue fragments measuring in aggregate 0.8 x 0.8 x 0.2 cm. The specimen is  entirely submitted for intraoperative diagnosis and then for permanent in one  cassette, A1 FSC. RXA/RXH    B. Received fresh for intraoperative consultation, labeled with the patient's  name and hospital number and \"superior right tonsil\", are multiple tan-red soft  tissue fragments measuring in aggregate 0.7 x 0.5 x 0.2 cm. The specimen is  entirely submitted for intraoperative diagnosis and then for permanent in one  cassette, B1 FSC. RXA/RXH    C: Received in formalin, labeled with the patient's name and hospital number  and \"right tonsil biopsy #1\", is a tan soft tissue fragment measuring 0.4 x 0.4  x 0.2 cm. The specimen is submitted in toto in one cassette, C1. SBS    D:  Received in formalin, labeled with the patient's name and hospital number  and \"right tonsil biopsy #2\", is a tan soft tissue fragment measuring 0.9 x 0.7  x 0.5 cm. The specimen is submitted in toto in one cassette, D1. SBS      rx/11/22/2022           The assays/tests were performed with appropriate positive and negative controls  which stained appropriately.    [facility]  Department of Pathology   [address] [place] [address]        PATHREP  09/02/2016     Name BABAK GROSS                                                                                                   Accession #: [id number]            Pathologist:                     Date of Procedure:    9/2/2016  Date Received:          9/6/2016  Date Reported           9/7/2016  Submitting Physician:     REQUESTING PROVIDER: PRITI العلي  Location:                      Other External #                                                                "     FINAL DIAGNOSIS  DIAGNOSIS:  (A)  Biopsy of esophagus:     -  Fragments of benign gastroesophageal mucosa with moderate        chronic active inflammation and foci of ulcer debris.     -  Goblet cell metaplasia is not demonstrated.     -  Dysplasia is not seen.    (B)  Biopsy of antrum:    -  Fragments of benign gastric antral/fundal mucosa with features       of chemical (reactive) gastropathy.    -  H. pylori organisms are not demonstrated (Giemsa stain).             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     -  Please see Comment.    Note  COMMENT:  (A)  Viral nuclear inclusions and hyphal elements are not demonstrated.    (B)  Clinical correlation is requested with origin of the biopsy from the    incisura region where goblet cell metaplasia is a normal finding.                                                                                                                                                                                                                                                                                                                                                                                                                                                                                        By the signature on this report, the individual or group listed as making the  Final Interpretation/Diagnosis certifies that they have reviewed this case.  PROFESSIONAL CODES:  CPT:  88305 x 2, 48318    ICD10:  K92.2, K92.0, K21.0, K29.60    ALEA SMALLS MD    (Electronically signed by)    Verified: 09/07/16    INES/JENNIFER             Microscopic Description:  MICROSCOPIC DESCRIPTION:  Microscopic slides examined.    IA/JENNIFER      Clinical History:  SPECIMEN SOURCE:  (A)  Biopsy of esophagus    (B)  Biopsy of antrum    CLINICAL INFORMATION:  Upper GI bleed and hematemesis      Specimens Submitted As:  A: Esophageal biopsy   B: Antral biopsy       Other Related Clinical  Data  PROFESSIONAL CODES:  CPT:  88305 x 2, 94490    ICD10:  K92.2, K92.0, K21.0, K29.60    ALEA SMALLS MD    (Electronically signed by)    Verified: 09/07/16    [facility]  Gross Description:  GROSS DESCRIPTION:  (A)  Received in formalin are four fragments of pink-tan mucosa-covered soft    tissue measuring 0.3 x 0.2 x 0.2 cm. in aggregate. The soft tissue is grossly    unremarkable and entirely submitted for routine microscopic evaluation.    (B)  Received in formalin is a single fragment of pink-tan mucosa-covered soft    tissue measuring 0.2 x 0.2 x 0.2 cm. The soft tissue is grossly unremarkable    and entirely submitted for routine microscopic evaluation.    [facility]  Department of Pathology   [address] [place] [id number]             Assessment/Plan   BABAK GROSS is a 42 year old Male with history of polysubstance abuse (snort cocaine, tobacco), recent found locally advanced OPSCC (p16+), started chemo rad on  1/11.     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   - s/p chemoradiation 1/11-2/28/22  - 3/8/23:  labs are stable Na wnl, he still drink and eat ok by mouth, mucositis related pain is tolerable with current meds. Pain med is managed by supp onc, wt loss due to pain.  Pt was over-using both BMX and dex, reinforced patient about appropriate  use of all the meds.  Pain meds are strictly managed by supp onc.  - 6/1/23: PET/CT showed very good response. Near complete interval resolution of hypermetabolic activity  within bilateral cervical lymph node stations. Mild residual josse activity within a  left cervical lymph node is indeterminate in nature and residual viable malignancy cannot be excluded. Persistent mouth pain mostly only with eating, some wt loss. Encouraged  pt to continue increase boost intake for extra nutritional support and f/u with dietitian.  I will arrange repeat CT neck with contrast in 3 months to f/u on his residual josse activity.  -6/29/23: swelling in submandibular & bilateral posterior neck tightness/pain, repeat CT neck on 6/18 showed no new lesions, left cervical LN was the same size as seen in 6/1 PET/CT.  Swelling is likely post treatment related, but advised pt to call us  if swelling or neck tightness/pain worsens.   -9/14/23: ongoing wt issue due to mouth pain, fluid reflux from nostril with drinking. Seen by speech, supp onc and dietitian today. CT neck will be rescheduled to f/u on residual josse activity seen in 6/1/23.  -9/23/23: CT neck showed stable right level 2 lymph node  -11/9/23: ongoing dry mouth with rotten/broken teeth, clear discharge from both ear canal with decreased hearing.  He will see ENT next week and also dentist.     # Wt loss: improving  -likely due to pain not well controlled since he cant get methadone at his current facility  -will try to see if he can go different facility where can methadone  -Dietitian will see him today      Plan:  -RTC in 12/7 (labs: CBC, CMP)  -follow up with Dr. Jerome nex week  -Continue f/u with dietitian and supp onc

## 2023-11-10 ENCOUNTER — TELEPHONE (OUTPATIENT)
Dept: PALLIATIVE MEDICINE | Facility: HOSPITAL | Age: 43
End: 2023-11-10
Payer: MEDICAID

## 2023-11-10 DIAGNOSIS — G89.3 NEOPLASM RELATED PAIN: Primary | ICD-10-CM

## 2023-11-10 DIAGNOSIS — Z51.5 ENCOUNTER FOR PALLIATIVE CARE: ICD-10-CM

## 2023-11-10 DIAGNOSIS — K13.79 MOUTH PAIN: ICD-10-CM

## 2023-11-10 DIAGNOSIS — C76.0 HEAD AND NECK CANCER (MULTI): ICD-10-CM

## 2023-11-10 DIAGNOSIS — G89.3 NEOPLASM RELATED PAIN: ICD-10-CM

## 2023-11-10 RX ORDER — NICOTINE POLACRILEX 4 MG/1
4 LOZENGE ORAL EVERY 2 HOUR PRN
Qty: 100 LOZENGE | Refills: 3 | Status: SHIPPED | OUTPATIENT
Start: 2023-11-10 | End: 2023-12-07 | Stop reason: WASHOUT

## 2023-11-10 NOTE — TELEPHONE ENCOUNTER
Called [facility] and informed that a PA has been obtained for Methadone 10mg. Acknowledged.   Coverage has been approved for this patient's medication from 11/9/23 until 5/6/24.  The approval notice from the insurance plan is attached for your reference. We are unable to run a test claim at this time. Please let me know if  you continue to have issues. Thank you for your help!

## 2023-11-10 NOTE — TELEPHONE ENCOUNTER
Patient states Nicotine lozenges were sent to incorrect pharmacy, asking for it to be [facility]rout[facility] [address]

## 2023-11-10 NOTE — TELEPHONE ENCOUNTER
Message left for patient on his voicemail informing the mouthwash can be picked up at [facility] and Methadone 10mg at [facility]

## 2023-11-13 LAB — CARBOXYTHC UR-MCNC: 228 NG/ML

## 2023-11-13 NOTE — PROGRESS NOTES
Cancer Follow up:  TSH:   Lab Results   Component Value Date    TSH 0.18 (L) 01/18/2023      Chest:     Chief Complaint   Patient presents with    Follow-up     Cancer of the tonsil follow up     HPI:  Shayne Messer is a 42 year old male following up with me today for his tonsil cancer. Last seen 7/23. He completed chemoradiation therapy 2/28/23. He has been following with Support[facility] as well as Dr. Burkitt. I reviewed his CT neck  from 9/2023 which showed stable right level 2 lymph node, no recurrence.  He is struggling with ongoing dry mouth (severe).  He has rotten/broken teeth - called for dental appointment today waiting for call back.  He has been sick for the last 6 weeks and notes +clear discharge from both ears with decreased hearing.  He has bilateral PE tubes in place.  He is finally feeling better from his illness standpoint.  He did have MBS 8/23 which showed +dysphagia and he was recommended to have speech therapy but reports no one has called him to arrange this so has never had any swallowing therapy since then.  He is trying to get on disability.  He is no longer homeless but has secured housing at least in the short term.    History:  Dx: T2N2M0 Right tonsil cancer, P16+  11/11/22: [facility] ED with newly discovered right tonsil mass - discharged home  11/22/22: S/p triple endoscopy with biopsy by Dr. Murphy. Found to have right tonsil mass involving the soft palate. Exposure was difficult and it was hard to get a biopsy. Biopsy did return +SCCa, P16+.  12/22: First seen by me  1/11/23: Started chemoradiation therapy  2/28/23: Completed chemoradiation therapy  5/2/23: +in [facility], seen in ED for possible drug   5/11/23: psychiatric eval  5/17/23: PE tubes placed  6/23: PET: Complete response to tx  8/23: MBS +dysphagia recommend therapy  9/23: CT neck negative for recurrent masses, level 2 LN stable    ROS:  Review of Systems   Constitutional:  Positive for appetite change and fatigue. Negative for  chills, fever and unexpected weight change.   HENT:  Positive for dental problem, ear discharge, hearing loss, sore throat, tinnitus and trouble swallowing. Negative for drooling, ear pain, facial swelling, mouth sores and voice change.    Respiratory:  Negative for cough, shortness of breath and stridor.    Gastrointestinal:  Negative for nausea and vomiting.   Musculoskeletal:  Negative for neck pain.   Hematological:  Negative for adenopathy.   All other systems reviewed and are negative.       PE:  ENT Physical Exam  Constitutional  Appearance: patient appears well-developed, grooming disheveled;  Communication/Voice: communication appropriate for developmental age; voice quality is hoarse and rough;  Head and Face  Appearance: head appears normal and face appears normal;  Salivary: glands normal;  Ear  Auricles: right auricle normal; left auricle normal;  Ear Canals: right ear canal normal; left ear canal normal;  Tympanic Membranes: right tympanic membrane normal; left tympanic membrane normal;  Nose  Internal Nose: nasal mucosa normal; septum normal; bilateral inferior turbinates normal;  Oral Cavity/Oropharynx  Teeth: tooth decay (+broken teeth) noted;  Gums: gingiva normal;  Tongue: normal;  Oral mucosa: normal;  OC/OP comments: Oropharynx is soft/no visible or palpable masses or lesions  Neck  Neck: radiation changes present;  Respiratory  Inspection: breathing unlabored;  Cardiovascular  Inspection: extremities are warm and well perfused;  Neurovestibular  Mental Status: alert and oriented;  Psychiatric: mood depressed; affect is flat;       Procedures   PROCEDURE NOTE:  Recommended flexible nasopharyngoscopy & laryngoscopy.  Risks, benefits, personnel and alternatives were explained.  The patient wished to proceed.  S/he was re-identified.  PROCEDURE:  Flexible nasopharyngoscopy and laryngoscopy  PREOPERATIVE DIAGNOSIS: oropharyngeal cancer  POSTOPERATIVE DIAGNOSIS: Same as above, no recurrent  lesions  INDICATIONS: Inability to tolerate mirror exam  ANESTHESIA: 4% lidocaine and 0.5% phenylephrine  PROCEDURE:  With the patient sitting upright topical anesthesia and vasoconstriction was applied with spray to the right side(s) of the nose.  After waiting an appropriate period of time for anesthesia/vasoconstriction to become effective, a flexible laryngoscope was passed through the right side(s) of the nose.  Nasopharynx, oropharynx, hypopharynx and larynx were examined.  FINDINGS:  The nasopharynx and oropharynx were normal without any lesions or masses visualized.  No masses or lesions were visualized at the base of tongue, vallecula, epiglottis, aryepiglottic folds, pyriform sinuses, and lateral pharyngeal walls.  Mild radiation changes.  True vocal fold movement was normal.  The patient's airway was widely patent with no evidence of obstruction.  Patient tolerated the procedure well, and there were no complications.     ASSESSMENT AND PLAN:  Problem List Items Addressed This Visit       Anxiety and depression    Current Assessment & Plan     Supportive oncology is engaged         Dysphagia    Current Assessment & Plan     Oropharyngeal dysphagia  Referral to SLP  He would benefit from routine SLP therapy, discussed that he needs to schedule this   He does have VPI but can benefit from strengthening exercises            Neoplasm related pain    Malignant neoplasm of tonsil (CMS/HCC)    Current Assessment & Plan     No evidence of disease  Discussed NavDx testing  Endoscopy today is negative  Encouragement provided  Supportive oncology services engaged  He is suffering from significant side effects from treatment as above and reviewed these and strategies  Follow up in 3 months         Chronic suppurative otitis media of both ears - Primary    Current Assessment & Plan     Recommend neomycin drops bilaterally x1 week  Call if drainage doesn't resolve         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    Current  Assessment & Plan     Continue sips of water, recommend humidification, xylitol etc  He is already on pilocarpine and conservative therapy         Poor dentition    Current Assessment & Plan     Referral to dental school, awaiting call back for scheduling  Will likely require dental extractions  Continue routine dental care         Relevant Orders    Referral to Dentistry    Acute effusion of both middle ears    Relevant Medications    neomycin-polymyxin-HC (Cortisporin) otic solution      Anette Jerome MD    Head & Neck Surgical Oncology & Reconstruction  Department of Otolaryngology - Head and Neck Surgery     By signing my name below, I, Charlene Vailibe, attest that this documentation has been prepared under the direction and in the presence of Dr. Anette Jerome MD.     All medical record entries made by the Scribe were at my direction and personally dictated by me, Dr. Anette Jerome. I have reviewed the chart and agree that the record accurately reflects my personal performance of the history, physical exam, discussion and plan.

## 2023-11-15 ENCOUNTER — TELEPHONE (OUTPATIENT)
Dept: HEMATOLOGY/ONCOLOGY | Facility: HOSPITAL | Age: 43
End: 2023-11-15

## 2023-11-15 ENCOUNTER — NUTRITION (OUTPATIENT)
Dept: HEMATOLOGY/ONCOLOGY | Facility: HOSPITAL | Age: 43
End: 2023-11-15

## 2023-11-15 ENCOUNTER — OFFICE VISIT (OUTPATIENT)
Dept: OTOLARYNGOLOGY | Facility: HOSPITAL | Age: 43
End: 2023-11-15
Payer: MEDICAID

## 2023-11-15 VITALS — HEIGHT: 69 IN | BODY MASS INDEX: 22.84 KG/M2 | TEMPERATURE: 97.3 F | WEIGHT: 154.2 LBS

## 2023-11-15 DIAGNOSIS — K11.7 XEROSTOMIA DUE TO RADIOTHERAPY: ICD-10-CM

## 2023-11-15 DIAGNOSIS — C09.9 MALIGNANT NEOPLASM OF TONSIL (MULTI): ICD-10-CM

## 2023-11-15 DIAGNOSIS — H66.3X3 CHRONIC SUPPURATIVE OTITIS MEDIA OF BOTH EARS, UNSPECIFIED OTITIS MEDIA LOCATION: Primary | ICD-10-CM

## 2023-11-15 DIAGNOSIS — G89.3 NEOPLASM RELATED PAIN: ICD-10-CM

## 2023-11-15 DIAGNOSIS — F41.9 ANXIETY AND DEPRESSION: ICD-10-CM

## 2023-11-15 DIAGNOSIS — H65.193 ACUTE EFFUSION OF BOTH MIDDLE EARS: ICD-10-CM
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
## 2023-11-15 DIAGNOSIS — K08.9 POOR DENTITION: ICD-10-CM

## 2023-11-15 DIAGNOSIS — R13.12 OROPHARYNGEAL DYSPHAGIA: ICD-10-CM

## 2023-11-15 DIAGNOSIS — F32.A ANXIETY AND DEPRESSION: ICD-10-CM

## 2023-11-15 PROCEDURE — 99214 OFFICE O/P EST MOD 30 MIN: CPT | Performed by: OTOLARYNGOLOGY

## 2023-11-15 PROCEDURE — 92511 NASOPHARYNGOSCOPY: CPT | Performed by: OTOLARYNGOLOGY

## 2023-11-15 PROCEDURE — 4004F PT TOBACCO SCREEN RCVD TLK: CPT | Performed by: OTOLARYNGOLOGY

## 2023-11-15 PROCEDURE — 3008F BODY MASS INDEX DOCD: CPT | Performed by: OTOLARYNGOLOGY

## 2023-11-15 RX ORDER — ONDANSETRON HYDROCHLORIDE 8 MG/1
TABLET, FILM COATED ORAL
COMMUNITY
Start: 2023-01-16 | End: 2023-12-11 | Stop reason: WASHOUT

## 2023-11-15 RX ORDER — POLYETHYLENE GLYCOL 3350 17 G/17G
POWDER, FOR SOLUTION ORAL
COMMUNITY
Start: 2023-06-20 | End: 2023-12-07 | Stop reason: WASHOUT

## 2023-11-15 RX ORDER — CLONIDINE HYDROCHLORIDE 0.1 MG/1
TABLET ORAL
COMMUNITY
Start: 2023-03-30 | End: 2023-12-11 | Stop reason: WASHOUT

## 2023-11-15 RX ORDER — MORPHINE SULFATE 30 MG/1
TABLET ORAL
COMMUNITY
Start: 2023-02-28 | End: 2023-12-07 | Stop reason: WASHOUT

## 2023-11-15 RX ORDER — TRAZODONE HYDROCHLORIDE 50 MG/1
50 TABLET ORAL NIGHTLY PRN
COMMUNITY
Start: 2023-05-16 | End: 2023-12-11 | Stop reason: WASHOUT

## 2023-11-15 RX ORDER — DEXAMETHASONE 4 MG/1
TABLET ORAL
COMMUNITY
Start: 2023-01-16 | End: 2023-12-07 | Stop reason: WASHOUT

## 2023-11-15 RX ORDER — ASPIRIN/CALCIUM CARB/MAGNESIUM 325 MG
TABLET ORAL
COMMUNITY
Start: 2023-09-22 | End: 2023-12-11 | Stop reason: WASHOUT

## 2023-11-15 RX ORDER — HYDROXYZINE HYDROCHLORIDE 25 MG/1
TABLET, FILM COATED ORAL
COMMUNITY
Start: 2023-03-30 | End: 2024-05-29 | Stop reason: WASHOUT

## 2023-11-15 RX ORDER — MELATONIN 10 MG
TABLET, SUBLINGUAL SUBLINGUAL
COMMUNITY
Start: 2023-03-30 | End: 2023-12-07 | Stop reason: WASHOUT

## 2023-11-15 RX ORDER — HYDROXYZINE HYDROCHLORIDE 50 MG/1
50 TABLET, FILM COATED ORAL 3 TIMES DAILY PRN
COMMUNITY
Start: 2023-05-16 | End: 2024-05-29 | Stop reason: WASHOUT

## 2023-11-15 RX ORDER — IBUPROFEN 400 MG/1
400 TABLET ORAL EVERY 8 HOURS
COMMUNITY
Start: 2022-12-20

## 2023-11-15 RX ORDER — NEOMYCIN SULFATE, POLYMYXIN B SULFATE, HYDROCORTISONE 3.5; 10000; 1 MG/ML; [USP'U]/ML; MG/ML
3 SOLUTION/ DROPS AURICULAR (OTIC) 2 TIMES DAILY
Qty: 3 ML | Refills: 0 | Status: SHIPPED | OUTPATIENT
Start: 2023-11-15 | End: 2023-11-27 | Stop reason: SDUPTHER

## 2023-11-15 RX ORDER — GUAIFENESIN AND PSEUDOEPHEDRINE HYDROCHLORIDE 1200; 120 MG/1; MG/1
1 TABLET, EXTENDED RELEASE ORAL EVERY 12 HOURS
COMMUNITY
Start: 2023-09-25 | End: 2023-12-07 | Stop reason: WASHOUT

## 2023-11-15 RX ORDER — BUPRENORPHINE AND NALOXONE 2; .5 MG/1; MG/1
FILM, SOLUBLE BUCCAL; SUBLINGUAL
COMMUNITY
Start: 2023-03-30 | End: 2023-12-07 | Stop reason: WASHOUT

## 2023-11-15 RX ORDER — DICYCLOMINE HYDROCHLORIDE 20 MG/1
20 TABLET ORAL 4 TIMES DAILY
COMMUNITY
Start: 2023-09-26 | End: 2023-12-07 | Stop reason: WASHOUT

## 2023-11-15 RX ORDER — QUETIAPINE 300 MG/1
TABLET, FILM COATED, EXTENDED RELEASE ORAL
COMMUNITY
Start: 2023-05-12 | End: 2023-12-11 | Stop reason: WASHOUT

## 2023-11-15 RX ORDER — TADALAFIL 20 MG/1
20 TABLET ORAL DAILY PRN
COMMUNITY
Start: 2023-09-18 | End: 2023-12-11 | Stop reason: WASHOUT

## 2023-11-15 RX ORDER — CHLORHEXIDINE GLUCONATE ORAL RINSE 1.2 MG/ML
SOLUTION DENTAL
COMMUNITY
Start: 2023-09-05 | End: 2023-12-11 | Stop reason: WASHOUT

## 2023-11-15 RX ORDER — ALBUTEROL SULFATE 90 UG/1
AEROSOL, METERED RESPIRATORY (INHALATION)
COMMUNITY
Start: 2023-09-25

## 2023-11-15 RX ORDER — PROCHLORPERAZINE MALEATE 10 MG
TABLET ORAL
COMMUNITY
Start: 2023-01-16 | End: 2023-12-07 | Stop reason: WASHOUT

## 2023-11-15 RX ORDER — ONDANSETRON 4 MG/1
TABLET, ORALLY DISINTEGRATING ORAL
COMMUNITY
Start: 2023-03-30 | End: 2023-12-07 | Stop reason: WASHOUT

## 2023-11-15 RX ORDER — FLUORIDE TOOTHPASTE
TOOTHPASTE DENTAL
COMMUNITY
Start: 2023-06-29 | End: 2023-12-07

## 2023-11-15 RX ORDER — GABAPENTIN 300 MG/1
CAPSULE ORAL
COMMUNITY
Start: 2023-03-30 | End: 2023-12-07 | Stop reason: WASHOUT

## 2023-11-15 RX ORDER — GUAIFENESIN 200 MG/10ML
SOLUTION ORAL
COMMUNITY
Start: 2023-06-30 | End: 2023-12-07 | Stop reason: WASHOUT

## 2023-11-15 RX ORDER — CYCLOBENZAPRINE HCL 10 MG
TABLET ORAL
COMMUNITY
Start: 2023-05-05 | End: 2023-12-07 | Stop reason: WASHOUT

## 2023-11-15 RX ORDER — POLYETHYLENE GLYCOL 3350 17 G/17G
17 POWDER, FOR SOLUTION ORAL DAILY
COMMUNITY
End: 2023-12-11 | Stop reason: WASHOUT

## 2023-11-15 RX ORDER — DOCUSATE SODIUM 50 MG AND SENNOSIDES 8.6 MG 8.6; 5 MG/1; MG/1
TABLET, FILM COATED ORAL
COMMUNITY
Start: 2023-06-28

## 2023-11-15 RX ORDER — SILDENAFIL 100 MG/1
100 TABLET, FILM COATED ORAL DAILY PRN
COMMUNITY
Start: 2022-11-19 | End: 2023-12-11 | Stop reason: WASHOUT

## 2023-11-15 RX ORDER — DIPHENHYDRAMINE HYDROCHLORIDE 25 MG/1
CAPSULE ORAL
COMMUNITY
Start: 2023-03-30 | End: 2023-12-07 | Stop reason: WASHOUT

## 2023-11-15 RX ORDER — GUAIFENESIN 100 MG/5ML
SOLUTION ORAL
COMMUNITY
Start: 2023-02-21 | End: 2023-12-07 | Stop reason: WASHOUT

## 2023-11-15 RX ORDER — ACETAMINOPHEN 325 MG/1
TABLET ORAL
COMMUNITY
Start: 2023-03-30 | End: 2023-12-07 | Stop reason: WASHOUT

## 2023-11-15 RX ORDER — FLUCONAZOLE 100 MG/1
TABLET ORAL
COMMUNITY
Start: 2023-01-24 | End: 2023-11-15 | Stop reason: WASHOUT

## 2023-11-15 ASSESSMENT — ENCOUNTER SYMPTOMS
NAUSEA: 0
ADENOPATHY: 0
UNEXPECTED WEIGHT CHANGE: 0
TROUBLE SWALLOWING: 1
FACIAL SWELLING: 0
VOMITING: 0
APPETITE CHANGE: 1
FATIGUE: 1
VOICE CHANGE: 0
SORE THROAT: 1
NECK PAIN: 0
SHORTNESS OF BREATH: 0
FEVER: 0
STRIDOR: 0
CHILLS: 0
COUGH: 0

## 2023-11-15 ASSESSMENT — PATIENT HEALTH QUESTIONNAIRE - PHQ9
2. FEELING DOWN, DEPRESSED OR HOPELESS: NOT AT ALL
SUM OF ALL RESPONSES TO PHQ9 QUESTIONS 1 & 2: 0
1. LITTLE INTEREST OR PLEASURE IN DOING THINGS: NOT AT ALL

## 2023-11-15 NOTE — PROGRESS NOTES
NUTRITION COMMUNICATION NOTE    Shayne Messer     REASON FOR COMMUNICATION:     Received an in-basket message that patient had called about delivery of his nutritional supplements  He previously reported he has a new number that is [phone]    Order for Boost VHC (Vanilla and Strawberry or only Strawberry) 4x/day (This provides 2120 kcals and 88 g protein.     Spoke with Donna, before meeting patient. They have received CMN and are waiting on approval from insurance.     Met with patient in clinic and notified patient of this and provided him with samples of Ensure Plus to help cover needs.     Wt Readings from Last 10 Encounters:   11/15/23 69.9 kg (154 lb 3.2 oz)   11/09/23 68.3 kg (150 lb 9.2 oz)   10/12/23 70.8 kg (156 lb)   10/04/23 71 kg (156 lb 8.4 oz)   10/01/23 68 kg (150 lb)   09/30/23 70.3 kg (155 lb)   07/19/23 72.4 kg (159 lb 11.2 oz)   06/29/23 73.3 kg (161 lb 9.6 oz)   05/17/23 80.7 kg (178 lb)   03/23/23 80.8 kg (178 lb 2.1 oz)       Will follow as able.

## 2023-11-15 NOTE — PATIENT INSTRUCTIONS
Dr. Jerome evaluated you today.    Your care plan is outlined below:  -- Ear drops prescribed  -- See the dentist for your teeth   -- Speech apt line: [phone]  -- Follow up with Dr. Jerome in 3 mo.  This appointment was scheduled at the end of your visit today.  If you need to reschedule, please call the office at [phone].  Please keep in mind that last minute cancellations often result in delayed follow-up appointments.     General appointment line please call [phone]  For general questions or scheduling issues please call [phone] option #2   For medical questions or surgery scheduling please call [phone] on Mondays, Wednesdays and Thursdays or [phone] on Tuesdays and Fridays. Please be sure to leave a voice mail or your call will not be able to be returned.     Dr. Jerome makes every effort to run on time for your appointments.  Therefore, if you are more than 30 minutes late for your appointment, unrelated to a scan or another appointment such as chemotherapy or radiation, your appointment will need to be rescheduled to another day.  We appreciate your understanding.

## 2023-11-16 PROBLEM — J35.8 TONSILLAR MASS: Status: RESOLVED | Noted: 2023-10-04 | Resolved: 2023-11-16

## 2023-11-16 PROBLEM — R22.1 NECK MASS: Status: RESOLVED | Noted: 2023-10-04 | Resolved: 2023-11-16

## 2023-11-16 PROBLEM — J03.90 TONSILLITIS: Status: RESOLVED | Noted: 2023-10-04 | Resolved: 2023-11-16

## 2023-11-16 PROBLEM — J39.2 OROPHARYNGEAL LESION: Status: RESOLVED | Noted: 2023-10-04 | Resolved: 2023-11-16

## 2023-11-16 PROBLEM — J39.2 THROAT MASS: Status: RESOLVED | Noted: 2023-10-04 | Resolved: 2023-11-16

## 2023-11-16 NOTE — ASSESSMENT & PLAN NOTE
Oropharyngeal dysphagia  Referral to SLP  He would benefit from routine SLP therapy, discussed that he needs to schedule this   He does have VPI but can benefit from strengthening exercises

## 2023-11-16 NOTE — ASSESSMENT & PLAN NOTE
No evidence of disease  Discussed NavDx testing  Endoscopy today is negative  Encouragement provided  Supportive oncology services engaged  He is suffering from significant side effects from treatment as above and reviewed these and strategies  Follow up in 3 months

## 2023-11-16 NOTE — ASSESSMENT & PLAN NOTE
Referral to dental school, awaiting call back for scheduling  Will likely require dental extractions  Continue routine dental care

## 2023-11-16 NOTE — ASSESSMENT & PLAN NOTE
Continue sips of water, recommend humidification, xylitol etc  He is already on pilocarpine and conservative therapy

## 2023-11-17 NOTE — TELEPHONE ENCOUNTER
Patient stated he needs to discuss the quantity of his last refill for the following medication, methadone.

## 2023-11-17 NOTE — TELEPHONE ENCOUNTER
I explained patient received 15 day supply of #45 tabs not 30 day supply. Patient agreeable to call when script is running low.

## 2023-11-20 ENCOUNTER — NUTRITION (OUTPATIENT)
Dept: HEMATOLOGY/ONCOLOGY | Facility: HOSPITAL | Age: 43
End: 2023-11-20
Payer: MEDICAID

## 2023-11-20 NOTE — PROGRESS NOTES
NUTRITION COMMUNICATION NOTE    Shayne Messer     REASON FOR COMMUNICATION:     Called Kathrin this morning  This call is due to an in basket message - that was handled by my colleague  Followed up with Kathrin and patient   Per ktahrin   11-17 delivered Boost VHC- received 120 cartons   Per patient he received these and is set.        Time Spent  Prep time on day of patient encounter: 5 minutes  Time spent directly with patient, family or caregiver: 10 minutes  Additional Time Spent on Patient Care Activities: 0 minutes  Documentation Time: 10 minutes  Other Time Spent: 0 minutes  Total: 25 minutes

## 2023-11-22 DIAGNOSIS — Z79.891 ENCOUNTER FOR MONITORING OPIOID MAINTENANCE THERAPY: ICD-10-CM

## 2023-11-22 DIAGNOSIS — Z51.5 ENCOUNTER FOR PALLIATIVE CARE: ICD-10-CM

## 2023-11-22 DIAGNOSIS — G89.3 NEOPLASM RELATED PAIN: ICD-10-CM

## 2023-11-22 DIAGNOSIS — Z51.81 ENCOUNTER FOR MONITORING OPIOID MAINTENANCE THERAPY: ICD-10-CM

## 2023-11-22 RX ORDER — METHADONE HYDROCHLORIDE 5 MG/1
5 TABLET ORAL EVERY 8 HOURS
Qty: 45 TABLET | Refills: 0 | Status: SHIPPED | OUTPATIENT
Start: 2023-11-22 | End: 2023-12-07 | Stop reason: DRUGHIGH

## 2023-11-22 RX ORDER — METHADONE HYDROCHLORIDE 10 MG/1
10 TABLET ORAL EVERY 8 HOURS
Qty: 45 TABLET | Refills: 0 | Status: SHIPPED | OUTPATIENT
Start: 2023-11-22 | End: 2023-12-07 | Stop reason: DRUGHIGH

## 2023-11-22 NOTE — TELEPHONE ENCOUNTER
Patient updated. Patient wants his primary phone number updated to [phone]. I will notify team to update.

## 2023-11-22 NOTE — TELEPHONE ENCOUNTER
OARRS report reviewed and reflects  prescription history, no aberrancy noted. Per OARRS, patient last filled Methadone 5mg #45/15 on 11/9 and 10mg #45/15 on 11/10. Patient will need 5mg by 11/24 and 10mg by 11/25. I will check if provider okay to date both meds for 11/24. Per last visit with Elissa Tracy CNP on 11/9 patient to continue medications. Patient with follow up visit scheduled with Elissa on 12/7.

## 2023-11-27 DIAGNOSIS — H65.193 ACUTE EFFUSION OF BOTH MIDDLE EARS: ICD-10-CM

## 2023-11-27 RX ORDER — NEOMYCIN SULFATE, POLYMYXIN B SULFATE, HYDROCORTISONE 3.5; 10000; 1 MG/ML; [USP'U]/ML; MG/ML
3 SOLUTION/ DROPS AURICULAR (OTIC) 2 TIMES DAILY
Qty: 3 ML | Refills: 0 | Status: SHIPPED | OUTPATIENT
Start: 2023-11-27 | End: 2023-11-29 | Stop reason: SDUPTHER

## 2023-11-29 DIAGNOSIS — H65.193 ACUTE EFFUSION OF BOTH MIDDLE EARS: ICD-10-CM

## 2023-11-29 RX ORDER — NEOMYCIN SULFATE, POLYMYXIN B SULFATE, HYDROCORTISONE 3.5; 10000; 1 MG/ML; [USP'U]/ML; MG/ML
3 SOLUTION/ DROPS AURICULAR (OTIC) 2 TIMES DAILY
Qty: 3 ML | Refills: 0 | Status: SHIPPED | OUTPATIENT
Start: 2023-11-29 | End: 2023-12-07 | Stop reason: WASHOUT

## 2023-12-01 ENCOUNTER — TELEPHONE (OUTPATIENT)
Dept: HEMATOLOGY/ONCOLOGY | Facility: HOSPITAL | Age: 43
End: 2023-12-01
Payer: MEDICAID

## 2023-12-04 ENCOUNTER — SOCIAL WORK (OUTPATIENT)
Dept: HEMATOLOGY/ONCOLOGY | Facility: HOSPITAL | Age: 43
End: 2023-12-04
Payer: MEDICAID

## 2023-12-04 NOTE — PROGRESS NOTES
Covering LISW  contacted pt to discuss transportation to upcoming appointment on 12/7 at 1pm with Dr. Burkitt and palliative care. Pt was unaware of this appointment however, states he plans to attend. LISW arranged transportation via roundtrip. Additionally, LISW inquired about pt's phone call regarding his dental concerns. Pt states he plans to discuss this during his appointment with Dr. Burkitt.     Additionally, LISW informed pt of new following , DAVIN Cantu. Pt agreeable to meet with LSW on 12/7. Patient denies any further psychosocial or support service needs at this time.     LISW updated following LSW

## 2023-12-07 ENCOUNTER — SOCIAL WORK (OUTPATIENT)
Dept: CASE MANAGEMENT | Facility: HOSPITAL | Age: 43
End: 2023-12-07

## 2023-12-07 ENCOUNTER — TELEPHONE (OUTPATIENT)
Dept: PALLIATIVE MEDICINE | Facility: HOSPITAL | Age: 43
End: 2023-12-07

## 2023-12-07 ENCOUNTER — OFFICE VISIT (OUTPATIENT)
Dept: PALLIATIVE MEDICINE | Facility: HOSPITAL | Age: 43
End: 2023-12-07
Payer: MEDICAID

## 2023-12-07 ENCOUNTER — NUTRITION (OUTPATIENT)
Dept: HEMATOLOGY/ONCOLOGY | Facility: HOSPITAL | Age: 43
End: 2023-12-07

## 2023-12-07 ENCOUNTER — OFFICE VISIT (OUTPATIENT)
Dept: HEMATOLOGY/ONCOLOGY | Facility: HOSPITAL | Age: 43
End: 2023-12-07
Payer: MEDICAID

## 2023-12-07 VITALS
WEIGHT: 160.05 LBS | BODY MASS INDEX: 23.41 KG/M2 | RESPIRATION RATE: 18 BRPM | SYSTOLIC BLOOD PRESSURE: 98 MMHG | DIASTOLIC BLOOD PRESSURE: 55 MMHG | TEMPERATURE: 97 F | HEART RATE: 72 BPM | OXYGEN SATURATION: 98 %

## 2023-12-07 DIAGNOSIS — K13.79 MOUTH PAIN: ICD-10-CM

## 2023-12-07 DIAGNOSIS — F31.32 BIPOLAR AFFECTIVE DISORDER, CURRENTLY DEPRESSED, MODERATE (MULTI): ICD-10-CM

## 2023-12-07 DIAGNOSIS — F41.9 ANXIETY AND DEPRESSION: ICD-10-CM

## 2023-12-07 DIAGNOSIS — Z51.81 ENCOUNTER FOR MONITORING OPIOID MAINTENANCE THERAPY: Primary | ICD-10-CM

## 2023-12-07 DIAGNOSIS — G89.3 NEOPLASM RELATED PAIN: ICD-10-CM

## 2023-12-07 DIAGNOSIS — Z79.891 ENCOUNTER FOR MONITORING OPIOID MAINTENANCE THERAPY: Primary | ICD-10-CM

## 2023-12-07 DIAGNOSIS — F31.9 BIPOLAR DEPRESSION (MULTI): ICD-10-CM

## 2023-12-07 DIAGNOSIS — Z51.5 ENCOUNTER FOR PALLIATIVE CARE: ICD-10-CM

## 2023-12-07 DIAGNOSIS — K59.03 DRUG-INDUCED CONSTIPATION: ICD-10-CM

## 2023-12-07 DIAGNOSIS — C09.9 MALIGNANT NEOPLASM OF TONSIL (MULTI): ICD-10-CM

## 2023-12-07 DIAGNOSIS — C76.0 HEAD AND NECK CANCER (MULTI): Primary | ICD-10-CM

## 2023-12-07 DIAGNOSIS — F32.A ANXIETY AND DEPRESSION: ICD-10-CM

## 2023-12-07 DIAGNOSIS — F19.90 ILLICIT DRUG USE: ICD-10-CM

## 2023-12-07 DIAGNOSIS — F15.10 METHAMPHETAMINE ABUSE (MULTI): ICD-10-CM

## 2023-12-07 PROCEDURE — 4004F PT TOBACCO SCREEN RCVD TLK: CPT | Performed by: INTERNAL MEDICINE

## 2023-12-07 PROCEDURE — 3074F SYST BP LT 130 MM HG: CPT | Performed by: INTERNAL MEDICINE

## 2023-12-07 PROCEDURE — 99215 OFFICE O/P EST HI 40 MIN: CPT | Performed by: NURSE PRACTITIONER

## 2023-12-07 PROCEDURE — 4004F PT TOBACCO SCREEN RCVD TLK: CPT | Performed by: NURSE PRACTITIONER

## 2023-12-07 PROCEDURE — 3008F BODY MASS INDEX DOCD: CPT | Performed by: INTERNAL MEDICINE

## 2023-12-07 PROCEDURE — 99215 OFFICE O/P EST HI 40 MIN: CPT | Performed by: INTERNAL MEDICINE

## 2023-12-07 PROCEDURE — 3008F BODY MASS INDEX DOCD: CPT | Performed by: NURSE PRACTITIONER

## 2023-12-07 PROCEDURE — 3078F DIAST BP <80 MM HG: CPT | Performed by: INTERNAL MEDICINE

## 2023-12-07 RX ORDER — NALOXONE HYDROCHLORIDE 4 MG/.1ML
4 SPRAY NASAL AS NEEDED
Qty: 2 EACH | Refills: 0 | Status: SHIPPED | OUTPATIENT
Start: 2023-12-07 | End: 2024-12-06

## 2023-12-07 RX ORDER — METHADONE HYDROCHLORIDE 10 MG/1
20 TABLET ORAL EVERY 8 HOURS
Qty: 180 TABLET | Refills: 0 | Status: SHIPPED | OUTPATIENT
Start: 2023-12-07 | End: 2024-01-10 | Stop reason: SDUPTHER

## 2023-12-07 RX ORDER — PILOCARPINE HYDROCHLORIDE 5 MG/1
5 TABLET, FILM COATED ORAL 3 TIMES DAILY PRN
Qty: 90 TABLET | Refills: 2 | Status: SHIPPED | OUTPATIENT
Start: 2023-12-07 | End: 2024-02-08 | Stop reason: SDUPTHER

## 2023-12-07 ASSESSMENT — PAIN SCALES - GENERAL: PAINLEVEL: 4

## 2023-12-07 NOTE — PROGRESS NOTES
RAMANAW met with patient during ongoing hem/onc visit and discussed dental coverage. RAMANAW informed the patient he has dental insurance through [employer] that will cover dental x-rays.     Following visit, RAMANAW received returned phone call from  billing department at [facility] and was informed they will accept the patient's insurance and his insurance will cover a CBCT x-ray which is believed to be the x-ray needed at this time. RAMANAW attempted to contact the patient via telephone to update but received no answer and left a voicemail.

## 2023-12-07 NOTE — PROGRESS NOTES
NUTRITION COMMUNICATION NOTE    Shayne Messer     REASON FOR COMMUNICATION:     Patient was seen prior to his appointment with Dr. Burkitt  He is here for follow with his diagnosis of Tonsillar Squamous Cell Carcinoma.   Patient reports appetite continues to be a problem and he continues to use supplements  Additioanlly  teeth, throat, and mouth are still very painful   He reports weight gain and he is happy with this.    Wt Readings from Last 10 Encounters:   12/07/23 72.6 kg (160 lb 0.9 oz)   11/15/23 69.9 kg (154 lb 3.2 oz)   11/09/23 68.3 kg (150 lb 9.2 oz)   10/12/23 70.8 kg (156 lb)   10/04/23 71 kg (156 lb 8.4 oz)   10/01/23 68 kg (150 lb)   09/30/23 70.3 kg (155 lb)   07/19/23 72.4 kg (159 lb 11.2 oz)   06/29/23 73.3 kg (161 lb 9.6 oz)   05/17/23 80.7 kg (178 lb)       No labs to note today     Patient receives    Boost VHC would prefer vanilla and strawberry or just strawberry.   He would like 4 per day to provide 2120 calories and 88 g protein    Will follow as able               Time Spent  Prep time on day of patient encounter: 10 minutes  Time spent directly with patient, family or caregiver: 10 minutes  Additional Time Spent on Patient Care Activities: 5 minutes  Documentation Time: 5 minutes  Other Time Spent: 0 minutes  Total: 30 minutes

## 2023-12-07 NOTE — PROGRESS NOTES
Patient ID: Shayne Messer is a 42 y.o. male.    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 Kyunghee Burkitt  Rad/onc: Dr. Valdez  ENT: Dr Jerome  Treatment:  - cisplatin 100mg/m2 q3wk with RT     Oncology history   - 11/11/2022: enlarging right tonsillar mass and RT neck mass x 6 months  - 11/22/2022: triple scope, Enlarged and indurated right tonsil with extension of abnormal appearing tissue onto the soft palate. No extension to BOT and no separate lesions or masses identified; biopsy of right tonsillar mass: invasive p16+ SCC       Past Medical History:   Past Medical History:  No date: Bipolar 1 disorder (CMS/HCC)  No date: Immunization not carried out for unspecified reason      Comment:  COVID-19 vaccination not done  No date: Other specified abnormal findings of blood chemistry      Comment:  High serum cholestanol   Surgical History:    No past surgical history on file.   Family History:    No family history on file.  Family Oncology History:    Cancer-related family history is not on file.  Social History:    Social History     Tobacco Use    Smoking status: Every Day    Smokeless tobacco: Current    Tobacco comments:     Vaping.   Substance Use Topics    Alcohol use: Never    Drug use: Yes     Types: Marijuana          Subjective   Chief Complaint:     HPI  -He reports  his mouth pain is still bothering quite a bit, currently on Methadone  -He gatined 3Ibs donald last visit.  He saw supp onc today.  -He still has sever dry mouth, hard to chew.    -He also has lots of broken teeth. He can only take soft diet.  He is in process of getting teeth removal  -Denies dysphagia, SOB, fever, chills.      PmHx:  Depression with suicidal ideation in the past requiring hospital admission.  Nothing that he reports in the last 6 months.    Cocaine abuse (snort), currently sober.  Daily smoker.   Self-reported ADHD.       ROS  Review of Systems -  Oncology    Allergies  No Known Allergies     Medications  Current Outpatient Medications   Medication Instructions    acetaminophen (Tylenol) 325 mg tablet     albuterol 90 mcg/actuation inhaler INHALE 2 PUFFS BY MOUTH EVERY 4 HOURS AS NEEDED. USE WITH SPACER    Banophen 25 mg capsule     buprenorphine-naloxone (Suboxone) 2-0.5 mg per sublingual film     chlorhexidine (Peridex) 0.12 % solution     cloNIDine (Catapres) 0.1 mg tablet     cyclobenzaprine (Flexeril) 10 mg tablet     dexAMETHasone (Decadron) 4 mg tablet     dicyclomine (BENTYL) 20 mg, oral, 4 times daily    gabapentin (Neurontin) 300 mg capsule     guaiFENesin (Robitussin) 100 mg/5 mL syrup     hydrOXYzine HCL (Atarax) 25 mg tablet     hydrOXYzine HCL (ATARAX) 50 mg, oral, 3 times daily PRN    ibuprofen 400 mg, oral, Every 8 hours    magic mouthwash (lidocaine, diphenhydrAMINE, Maalox 1:1:1) 10 mL, Swish & Swallow, Every 6 hours PRN    melatonin 10 mg tablet, sublingual     methadone (DOLOPHINE) 10 mg, oral, Every 8 hours, In addition to 5mg for a total of 15mg    morphine (MSIR) 30 mg tablet     Mucinex D Maximum Strength 120-1,200 mg tablet extended release 12 hr 1 tablet, oral, Every 12 hours    nicotine polacrilex (Commit) 4 mg lozenge DISSOLVE 1 LOZENGE BY MOUTH EVERY 4 HOURS    ondansetron (Zofran) 8 mg tablet     ondansetron ODT (Zofran-ODT) 4 mg disintegrating tablet     pilocarpine (SALAGEN) 5 mg, oral, 3 times daily PRN    polyethylene glycol (Glycolax, Miralax) 17 gram packet     polyethylene glycol (GLYCOLAX, MIRALAX) 17 g, oral, Daily    prochlorperazine (Compazine) 10 mg tablet     QUEtiapine XR (SEROquel XR) 300 mg 24 hr tablet     Robafen 100 mg/5 mL syrup     sildenafil (VIAGRA) 100 mg, oral, Daily PRN    Stimulant Laxative Plus 8.6-50 mg tablet     tadalafil (CIALIS) 20 mg, oral, Daily PRN    traZODone (DESYREL) 50 mg, oral, Nightly PRN          Objective   VS: BP 98/55 (BP Location: Left arm, Patient Position: Sitting, BP Cuff Size:  Adult)   Pulse 72   Temp 36.1 °C (97 °F) (Temporal)   Resp 18   Wt 72.6 kg (160 lb 0.9 oz)   SpO2 98%   BMI 23.41 kg/m²   Weight: Daily Weight  12/07/23 : 72.6 kg (160 lb 0.9 oz)  11/15/23 : 69.9 kg (154 lb 3.2 oz)  11/09/23 : 68.3 kg (150 lb 9.2 oz)  10/12/23 : 70.8 kg (156 lb)  10/04/23 : 71 kg (156 lb 8.4 oz)  10/01/23 : 68 kg (150 lb)  09/30/23 : 70.3 kg (155 lb)      Physical Exam  Constitutional:       Appearance: Normal appearance.   HENT:      Head: Normocephalic and atraumatic.      Mouth/Throat:      Mouth: Mucous membranes are moist.      Comments: Multiple rotten and broken bottom teeth.  Eyes:      Extraocular Movements: Extraocular movements intact.      Pupils: Pupils are equal, round, and reactive to light.   Cardiovascular:      Rate and Rhythm: Normal rate and regular rhythm.   Pulmonary:      Effort: Pulmonary effort is normal.      Breath sounds: Normal breath sounds.   Musculoskeletal:         General: Normal range of motion.      Cervical back: Normal range of motion and neck supple.   Skin:     General: Skin is warm.   Neurological:      General: No focal deficit present.      Mental Status: He is alert and oriented to person, place, and time.         Diagnostic Results   Labs  Below labs are reviewed today.                         Images       Pathology  Lab Results   Component Value Date    PATHREP  11/22/2022     Name BABAK GROSS                                                                                                   Accession #: [id number]            Pathologist:                   WADAD S. MNEIMNEH, MD  Date of Procedure:    11/22/2022  Date Received:          11/22/2022  Date Reported           11/28/2022  Submitting Physician:   OLGA KOEHLER MD  Location:                    [facility]  Other External #                                                                    FINAL DIAGNOSIS  A.  RIGHT TONSIL, BIOPSY:    -- INVASIVE P16-POSITIVE SQUAMOUS CELL CARCINOMA,  MINIMALLY REPRESENTED (SEE  NOTE).    Note: Minute foci of nonkeratinizing squamous cell carcinoma are noted,  strongly diffusely positive for p16 immunostain.    P16 by immunohistochemistry:  Positive    Reference Range:  Negative:  <50% strong nuclear and cytoplasmic invasive tumor cell staining  Equivocal: 50-70% strong nuclear and cytoplasmic invasive tumor cell staining   Positive: >70% strong nuclear and cytoplasmic invasive tumor cell staining     Findings received by Dr Murphy on Nov 24, 2022, at 6:50 AM    B.  SUPERIOR RIGHT TONSIL, BIOPSY:  -- SQUAMOUS MUCOSA AND ASSOCIATED LYMPHOID TISSUE WITH FOLLICULAR HYPERPLASIA.   -- NO CARCINOMA IDENTIFIED.    Note: Immunostain for p16 performed on block B1 shows focal, patchy, weak  nonspecific staining in the squamous epithelium.       C.  RIGHT TONSIL, BIOPSY # 1:    -- SQUAMOUS MUCOSA AND ASSOCIATED LYMPHOID TISSUE WITH FOLLICULAR HYPERPLASIA  (SEE NOTE).  -- NO CARCINOMA IDENTIFIED.    Note: Multiple levels were examined.    D.  RIGHT TONSIL BIOPSY # 2:    -- SQUAMOUS MUCOSA WITH FOCAL LYMPHOID TISSUE.  -- NO CARCINOMA IDENTIFIED.                                                                                                                                                                                                                                                                                                                                                                                                                                                                                       Electronically Signed Out By WADAD S. MNEIMNEH, MD/WSMARY  By the signature on this report, the individual or group listed as making the  Final Interpretation/Diagnosis certifies that they have reviewed this case.  Diagnostic interpretation performed at [facility] [address] [address] [facility] [address]         Intraoperative Consultation:  A: RIGHT TONSIL   "BIOPSY    Frozen Section 1:  Date Ordered: 11/22/2022 08:59     Date Received: 11/22/2022 08:59     Date  Called: 11/22/2022 09:38    Intraoperative Diagnosis:  A) Right tonsil biopsy (FSA1): Small detached fragment of atypical cells,  suspicious for carcinoma but not diagnostic; requested additional tissue.    : Dr. Mckenzie Cornejo    Intraoperative Consult Pathologist(s):  VENECIA KEYS MD (P)    B: SUPERIOR RIGHT TONSIL    Frozen Section 1:  Date Ordered: 11/22/2022 09:46     Date Received: 11/22/2022 09:46     Date  Called: 11/22/2022 10:02    Intraoperative Diagnosis:  B) Superior right tonsil (FSB1): Benign tonsillar tissue.  Intraoperative Consult Pathologist(s):  VENECIA KEYS MD (P)      [facility]/11/22/2022           Clinical History:  Squamous cell carcinoma    Specimens Submitted As:  A: RIGHT TONSIL  BIOPSY   B: SUPERIOR RIGHT TONSIL   C: RIGHT TONSIL BIOPSY # 1   D: RIGHT TONSIL BIOPSY # 2     Gross Description:  A. Received fresh for intraoperative consultation, labeled with the patient's  name and hospital number and \"right tonsil biopsy\", are multiple tan-red soft  tissue fragments measuring in aggregate 0.8 x 0.8 x 0.2 cm. The specimen is  entirely submitted for intraoperative diagnosis and then for permanent in one  cassette, A1 FSC. RXA/RXH    B. Received fresh for intraoperative consultation, labeled with the patient's  name and hospital number and \"superior right tonsil\", are multiple tan-red soft  tissue fragments measuring in aggregate 0.7 x 0.5 x 0.2 cm. The specimen is  entirely submitted for intraoperative diagnosis and then for permanent in one  cassette, B1 FSC. RXA/RXH    C: Received in formalin, labeled with the patient's name and hospital number  and \"right tonsil biopsy #1\", is a tan soft tissue fragment measuring 0.4 x 0.4  x 0.2 cm. The specimen is submitted in toto in one cassette, C1. SBS    D:  Received in formalin, labeled with the patient's name and " "hospital number  and \"right tonsil biopsy #2\", is a tan soft tissue fragment measuring 0.9 x 0.7  x 0.5 cm. The specimen is submitted in toto in one cassette, D1. SBS      rx/11/22/2022           The assays/tests were performed with appropriate positive and negative controls  which stained appropriately.    [facility]  Department of Pathology   [address] [place] [address]        PATHREP  09/02/2016     Name BABAK GROSSJael                                                                                                   Accession #: [id number]            Pathologist:                     Date of Procedure:    9/2/2016  Date Received:          9/6/2016  Date Reported           9/7/2016  Submitting Physician:     REQUESTING PROVIDER: PRITI العلي  Location:                      Other External #                                                                    FINAL DIAGNOSIS  DIAGNOSIS:  (A)  Biopsy of esophagus:     -  Fragments of benign gastroesophageal mucosa with moderate        chronic active inflammation and foci of ulcer debris.     -  Goblet cell metaplasia is not demonstrated.     -  Dysplasia is not seen.    (B)  Biopsy of antrum:    -  Fragments of benign gastric antral/fundal mucosa with features       of chemical (reactive) gastropathy.    -  H. pylori organisms are not demonstrated (Giemsa stain).             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     -  Please see Comment.    Note  COMMENT:  (A)  Viral nuclear inclusions and hyphal elements are not demonstrated.    (B)  Clinical correlation is requested with origin of the biopsy from the    incisura region where goblet cell metaplasia is a normal finding.                                                                                                                                                                                                                                                 "                                                                                                                                                                                                                                        By the signature on this report, the individual or group listed as making the  Final Interpretation/Diagnosis certifies that they have reviewed this case.  PROFESSIONAL CODES:  CPT:  88305 x 2, 40342    ICD10:  K92.2, K92.0, K21.0, K29.60    ALEA SMALLS MD    (Electronically signed by)    Verified: 09/07/16    [facility]             Microscopic Description:  MICROSCOPIC DESCRIPTION:  Microscopic slides examined.    [facility]      Clinical History:  SPECIMEN SOURCE:  (A)  Biopsy of esophagus    (B)  Biopsy of antrum    CLINICAL INFORMATION:  Upper GI bleed and hematemesis      Specimens Submitted As:  A: Esophageal biopsy   B: Antral biopsy       Other Related Clinical Data  PROFESSIONAL CODES:  CPT:  88305 x 2, 58628    ICD10:  K92.2, K92.0, K21.0, K29.60    ALEA SMALLS MD    (Electronically signed by)    Verified: 09/07/16    [facility]  Gross Description:  GROSS DESCRIPTION:  (A)  Received in formalin are four fragments of pink-tan mucosa-covered soft    tissue measuring 0.3 x 0.2 x 0.2 cm. in aggregate. The soft tissue is grossly    unremarkable and entirely submitted for routine microscopic evaluation.    (B)  Received in formalin is a single fragment of pink-tan mucosa-covered soft    tissue measuring 0.2 x 0.2 x 0.2 cm. The soft tissue is grossly unremarkable    and entirely submitted for routine microscopic evaluation.    [facility]  Department of Pathology   [address] [place] [address]             Assessment/Plan   BABAK GROSS is a 42 year old Male with history of polysubstance abuse (snort cocaine, tobacco), recent found locally advanced OPSCC (p16+), started chemo rad on  1/11.     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   - s/p chemoradiation 1/11-2/28/22  - 3/8/23:  labs are stable Na wnl, he still drink and eat ok by mouth, mucositis related pain is tolerable with current meds. Pain med is managed by supp onc, wt loss due to pain.  Pt was over-using both BMX and dex, reinforced patient about appropriate  use of all the meds.  Pain meds are strictly managed by supp onc.  - 6/1/23: PET/CT showed very good response. Near complete interval resolution of hypermetabolic activity  within bilateral cervical lymph node stations. Mild residual josse activity within a left cervical lymph node is indeterminate in nature and residual viable malignancy cannot be excluded. Persistent mouth pain mostly only with eating, some wt loss. Encouraged  pt to continue increase boost intake for extra nutritional support and f/u with dietitian.  I will arrange repeat CT neck with contrast in 3 months to f/u on his residual josse activity.  -6/29/23: swelling in submandibular & bilateral posterior neck tightness/pain, repeat CT neck on 6/18 showed no new lesions, left cervical LN was the same size as seen in 6/1 PET/CT.  Swelling is likely post treatment related, but advised pt to call us  if swelling or neck tightness/pain worsens.   -9/14/23: ongoing wt issue due to mouth pain, fluid reflux from nostril with drinking. Seen by speech, supp onc and dietitian today. CT neck will be rescheduled to f/u on residual josse activity seen in 6/1/23.  -9/23/23: CT neck showed stable right level 2 lymph node  -11/9/23: ongoing dry mouth with rotten/broken teeth, clear discharge from both ear canal with decreased  hearing.  He will see ENT next week and also dentist.  -12/7/23: ongoing dry mouth with rotten/broken teeth, insurance issues related to dental work up, seen by  SW today.      # Wt loss: improving  -likely due to pain not well controlled since he cant get methadone at his current facility  -3Ibs wt gain since last visit      Plan:  -RTC in 2 months with labs (CBC, CMP)  -follow up with Dentist for teeth removal  -Continue f/u with dietitian and supp onc

## 2023-12-07 NOTE — PROGRESS NOTES
SUPPORTIVE AND PALLIATIVE ONCOLOGY OUTPATIENT FOLLOW-UP      SERVICE DATE: 12/7/2023    Subjective   HISTORY OF PRESENT ILLNESS: Shayne Messer is a 42 y.o. male who presents with depression with suicidal attempts, bipolar disorder, tobacco abuse, illicit drug abuse, and diagnosis of Tonsillar Squamous Cell Carcinoma. Patient is following with Dr. Burkitt as primary oncologist. Patient has been referred to Supportive Oncology/Palliative Care for neoplasm related pain and further symptom management.      Pain Assessment:  Pain Score:  7  Location:  throat/mouth/teeth    Symptom Assessment:  Pain:somewhat - teeth, throat, and mouth are still very painful. He has had difficulty following up with the dental school. He was there recently and discussed needing a new Xray in order to pull the rest of his teeth but he is worried his insurance will not cover. He is in the process of getting this worked out. His broken teeth are causing him a lot of pain and continued difficulty eating. He continues to have throat pain that is at its worst in the morning or with swallowing. He continues Methadone 15mg TID. He is out of BMX and needs a refill today  Numbness or Tingling in hands/feet/other: none  Sore Muscles/Spasms: none  Headache: none  Dizziness:none  Constipation: a little - using Miralax as needed at home  Diarrhea: none  Nausea: none  Vomiting: none  Lack of Appetite: a little - difficulty eating due to pain especially from teeth. He has been drink Ensure a few times a day and has gained weight today which he is happy about  Weight Loss: none  Taste changes: none  Dry Mouth: a little - notes Pilocarpine has helped some, in need of refill  Pain in Mouth/Swallowing: a little - as above  Lack of Energy: none  Difficulty Sleeping: none  Worrying: none  Anxiety: none  Depression: none  Shortness of breath: none  Other: none    Information obtained from: chart review and interview of  "patient  ______________________________________________________________________        Objective   Office Visit on 11/09/2023   Component Date Value Ref Range Status    Amphetamine Screen, Urine 11/09/2023 Presumptive Negative  Presumptive Negative Final    Barbiturate Screen, Urine 11/09/2023 Presumptive Negative  Presumptive Negative Final    Benzodiazepines Screen, Urine 11/09/2023 Presumptive Negative  Presumptive Negative Final    Cannabinoid Screen, Urine 11/09/2023 Presumptive Positive (A)  Presumptive Negative Final    Cocaine Metabolite Screen, Urine 11/09/2023 Presumptive Negative  Presumptive Negative Final    Fentanyl Screen, Urine 11/09/2023 Presumptive Negative  Presumptive Negative Final    Opiate Screen, Urine 11/09/2023 Presumptive Negative  Presumptive Negative Final    Oxycodone Screen, Urine 11/09/2023 Presumptive Negative  Presumptive Negative Final    PCP Screen, Urine 11/09/2023 Presumptive Negative  Presumptive Negative Final    14-Dno-9-carboxy-THC, Urn, Quant 11/09/2023 228  ng/mL Final       PHYSICAL EXAMINATION   Vital Signs:   Vital signs reviewed      10/12/2023     5:48 PM 10/12/2023    10:01 PM 10/13/2023     3:11 AM 10/13/2023     9:28 AM 11/9/2023     1:32 PM 11/15/2023     1:07 PM 12/7/2023    12:37 PM   Vitals   Systolic 116 123 115 121 121  98   Diastolic 78 67 76 84 78  55   Heart Rate 70 71 63 79 87  72   Temp 35.6 °C (96.1 °F)  36.6 °C (97.9 °F)  36.2 °C (97.2 °F) 36.3 °C (97.3 °F) 36.1 °C (97 °F)   Resp 18 16 16 18 18  18   Height (in) 1.753 m (5' 9\")    1.761 m (5' 9.33\") 1.761 m (5' 9.33\")    Weight (lb) 156    150.57 154.2 160.05   BMI 23.04 kg/m2    22.02 kg/m2 22.56 kg/m2 23.41 kg/m2   BSA (m2) 1.86 m2    1.83 m2 1.85 m2 1.88 m2   Visit Report     Report    Report Report Report          Physical Exam  Vitals reviewed.   Constitutional:       Appearance: Normal appearance.   HENT:      Head: Normocephalic.   Cardiovascular:      Rate and Rhythm: Normal rate and regular " rhythm.   Pulmonary:      Effort: Pulmonary effort is normal.   Abdominal:      General: Abdomen is flat.      Palpations: Abdomen is soft.   Musculoskeletal:         General: Normal range of motion.   Skin:     General: Skin is warm and dry.   Neurological:      General: No focal deficit present.      Mental Status: He is alert and oriented to person, place, and time. Mental status is at baseline.   Psychiatric:         Mood and Affect: Mood normal.         Behavior: Behavior normal.         Thought Content: Thought content normal.         Judgment: Judgment normal.       ASSESSMENT/PLAN    Pain  Pain is: chronic after cancer treatment  Type: somatic and neuropathic  Pain control: sub-optimally controlled  Home regimen:   - Continue Acetaminophen 1000mg [id number] PRN  - Increase to Methadone 20mg [id number]   - EKG: (6/15/23) QTc 455. (12/7/23) Qtc 447. - Next Visit (2/8/23)  - Continue BMX 10mg QID PRN - given printed script (12/7/23) to take to [facility]  - Viscous Lidocaine 2% - declines - caused patient to vomit due to taste  - Morphine - patient went to detox and taken off medication  - 4/20/23: Patient admitted to doing Meth, no further opioids at this time  - Referral to [facility] (9/14/2023) - never scheduled  - [facility] [employer] [facility] dental work - needs to make follow-up      Opioid Use  Medication Management:   - OARRS report reviewed with no aberrant behavior; consistent with  prescriptions/records and patient history  - .  Overdose Risk Score 750. This has been discussed with patient.   - We will continue to closely monitor the patient for signs of prescription misuse including UDS, OARRS review and subjective reports at each visit.  - No concurrent benzodiazepine use   - I am a provider who either is or has consulted and collaborated with a provider certified in Hospice and Palliative Medicine and have conducted a face-face visit and examination for this patient.  -  Routine Urine Drug Screen: 1/11/23 appropriately negative for illicit substances.  4/20/23, 6/29/23, 11/9/23 appropriately + for prescribed medications and  - for illicits.  - Controlled Substance Agreement: 1/11/23  - Specifically discussed that controlled substance prescriptions will only be provided by our group as outlined in the completed agreement  - Prescribed naloxone: declined 1/11/23  - Red Flags: hx of cocaine abuse; hx of suicidal attempts     Constipation  At risk for constipation related to opioids.  Usual bowel pattern: daily  Home regimen:   - Continue Senna 2 tabs 1-2x per day PRN  - Continue Miralax 17grams 1-2x per day PRN     Decreased Appetite  Related to  pain/treatment hx  Nutrition following - Lora Sanderson RDN  Home regimen:    - Continue to monitor  - Continue protein supplements     Xerostomia  - Continue Biotene Rinses 15mL QID PRN  - Continue Pilocarpine 5mg TID PRN     Supportive and Palliative Oncology Encounter:  Emotional support provided  Coordination of care  Will continue to follow and address symptoms as needed     Advance Directives  Code Status: Full code    Next Follow-Up Visit:  Return to clinic in 2 months to align with oncology    Signature and billing  Medical complexity was moderate level due to due to complexity of problems, extensive data review, and high risk of management/treatment.  Time was spent on the following: Prep Time, Time Directly with Patient/Family/Caregiver, Documentation Time. Total time spent: 30      Data  Some elements copied from Palliative Care note on 11/9/23, the elements have been updated and all reflect current decision making from today, 12/7/2023.      Plan of Care discussed with: Patient    SIGNATURE: SINDY Peralta-CNP    Contact information:  Supportive and Palliative Oncology  Monday-Friday 8 AM-5 PM  Phone:  [phone], press option #5, then option #1.   Or Epic Secure Chat

## 2023-12-08 ENCOUNTER — TELEPHONE (OUTPATIENT)
Dept: HEMATOLOGY/ONCOLOGY | Facility: HOSPITAL | Age: 43
End: 2023-12-08
Payer: MEDICAID

## 2023-12-08 ENCOUNTER — TELEPHONE (OUTPATIENT)
Dept: PALLIATIVE MEDICINE | Facility: HOSPITAL | Age: 43
End: 2023-12-08
Payer: MEDICAID

## 2023-12-08 NOTE — TELEPHONE ENCOUNTER
Jermaine spoke with Shayne about insurance coverage for the xrays at [facility]. She will follow-up with the patient.

## 2023-12-08 NOTE — TELEPHONE ENCOUNTER
Late entry  [email or web address] 12/7/23. Prior authorization needed for Methadone. Request sent to  Specialty

## 2023-12-11 ENCOUNTER — TELEPHONE (OUTPATIENT)
Dept: HEMATOLOGY/ONCOLOGY | Facility: HOSPITAL | Age: 43
End: 2023-12-11
Payer: MEDICAID

## 2023-12-11 NOTE — TELEPHONE ENCOUNTER
Per Shayne, he was trying to fill his methadone over the weekend and was unable to. I called the pharmacy and pharmacist states the prescription cannot be filled until 12/23 despite this being a dose increase. He reports he entered all internal codes. PA request was submitted on Friday 12/8. I have emailed [facility] for an update. We will call patient when we hear back. He is aware of this. He is unable to pay out of pocket for a short supply.

## 2023-12-12 NOTE — TELEPHONE ENCOUNTER
"Per [facility], \"I called the insurance to follow up and coverage has been approved for this patient's medication from 12/7/23 until 6/3/24 with auth # [id number].\" I spoke to pharmacist who was unable to get medication to go through but will call insurance help desk now. Pharmacist said he will update patient once filled.    "

## 2023-12-18 ENCOUNTER — TELEPHONE (OUTPATIENT)
Dept: HEMATOLOGY/ONCOLOGY | Facility: CLINIC | Age: 43
End: 2023-12-18

## 2023-12-18 ENCOUNTER — NUTRITION (OUTPATIENT)
Dept: HEMATOLOGY/ONCOLOGY | Facility: HOSPITAL | Age: 43
End: 2023-12-18
Payer: MEDICAID

## 2023-12-18 NOTE — PROGRESS NOTES
NUTRITION COMMUNICATION NOTE    Shayne Messer     REASON FOR COMMUNICATION:     Received secure message that patient needs to re-order  his ONS  Called Donna to re-order for patient  Reordered 11- last time  He will be shipped his [facility] as ordered     Wt Readings from Last 10 Encounters:   12/07/23 72.6 kg (160 lb 0.9 oz)   11/15/23 69.9 kg (154 lb 3.2 oz)   11/09/23 68.3 kg (150 lb 9.2 oz)   10/12/23 70.8 kg (156 lb)   10/04/23 71 kg (156 lb 8.4 oz)   10/01/23 68 kg (150 lb)   09/30/23 70.3 kg (155 lb)   07/19/23 72.4 kg (159 lb 11.2 oz)   06/29/23 73.3 kg (161 lb 9.6 oz)   05/17/23 80.7 kg (178 lb)              Time Spent  Prep time on day of patient encounter: 5 minutes  Time spent directly with patient, family or caregiver: 5 minutes  Additional Time Spent on Patient Care Activities: 0 minutes  Documentation Time: 2 minutes  Other Time Spent: 0 minutes  Total: 12 minutes

## 2023-12-26 ENCOUNTER — TELEPHONE (OUTPATIENT)
Dept: HEMATOLOGY/ONCOLOGY | Facility: CLINIC | Age: 43
End: 2023-12-26
Payer: MEDICAID

## 2023-12-26 DIAGNOSIS — Z51.5 PALLIATIVE CARE ENCOUNTER: Primary | ICD-10-CM

## 2023-12-29 ENCOUNTER — TELEPHONE (OUTPATIENT)
Dept: SCHEDULING | Age: 43
End: 2023-12-29
Payer: MEDICAID

## 2024-01-02 ENCOUNTER — TELEPHONE (OUTPATIENT)
Dept: HEMATOLOGY/ONCOLOGY | Facility: CLINIC | Age: 44
End: 2024-01-02
Payer: MEDICAID

## 2024-01-02 ENCOUNTER — APPOINTMENT (OUTPATIENT)
Dept: BEHAVIORAL HEALTH | Facility: HOSPITAL | Age: 44
End: 2024-01-02
Payer: MEDICAID

## 2024-01-02 ENCOUNTER — SOCIAL WORK (OUTPATIENT)
Dept: CASE MANAGEMENT | Facility: HOSPITAL | Age: 44
End: 2024-01-02
Payer: MEDICAID

## 2024-01-02 NOTE — PROGRESS NOTES
[facility]  received a request to contact patient via secure chat. [facility] contacted patient via telephone at approximately 2:13pm to discuss transportation. Patient began speaking belligerently towards [facility] in regards to transportation and being referred to [facility]. [facility] attempted to gather more information from patient but was unable to do so due to patient continuously speaking over [facility]. [facility] informed patient if he continued to yell and swear the conversation will be terminated. Patient proceeded to hang up on [facility]. [facility] has scheduled round trip for patient's upcoming hem/onc appointment on 2/8/24.

## 2024-01-02 NOTE — TELEPHONE ENCOUNTER
Attempted to contact patient 3 times with his number on file [phone]. All 3 attempts resulted in a busy signal.     Patient's next appointment with Dr. Burkitt is 2/8/24. This RN reached out to the  team to ensure he has transportation arranged for this visit as well as his visit with onco-psych on 1/5/24.

## 2024-01-02 NOTE — TELEPHONE ENCOUNTER
Called and spoke with patient. He is very frustrated about several aspects of his care. I tried to explain that we dont provide transportation for his appointments to [facility]. He seemed to understand that after I explained to him we really just help with transportation to  appointments.    I explained that he has an appointment with Dr. Burkitt scheduled for 2/8/24. I told him we would help arrange transportation for that appointment.    Patient expressed that he has been sick for the past few months, that he has reached out to Dr. Jerome's office for antibiotics and that the pharmacy never filled them. I encouraged him to reach out to their office to inform them that the antibiotics were never taken. He said he would call. He was agreeable to the plan to see Dr. Burkitt on Feb. 8th. I encouraged him to establish with a primary care provider. He said he would look into this.

## 2024-01-04 ENCOUNTER — TELEPHONE (OUTPATIENT)
Dept: HEMATOLOGY/ONCOLOGY | Facility: CLINIC | Age: 44
End: 2024-01-04
Payer: MEDICAID

## 2024-01-04 DIAGNOSIS — C09.9 MALIGNANT NEOPLASM OF TONSIL (MULTI): Primary | ICD-10-CM

## 2024-01-04 NOTE — TELEPHONE ENCOUNTER
Called the patient to let him know that a  dentistry referral was made after he had his [facility] appointment this afternoon, 1/4/24.    Patient very frustrated that xray was not covered, even though he was under the impression that it would be. I expressed the need for the patient to confirm what type of xray was covered and what xray case wanted. He was not receptive to this and said that we are not helpful with his care.     I relayed this messaged to the care team and to the . I told him that someone would reach out to coordinate an appointment with  dentistry.   
yes

## 2024-01-05 ENCOUNTER — APPOINTMENT (OUTPATIENT)
Dept: BEHAVIORAL HEALTH | Facility: CLINIC | Age: 44
End: 2024-01-05
Payer: MEDICAID

## 2024-01-05 ENCOUNTER — TELEPHONE (OUTPATIENT)
Dept: OTOLARYNGOLOGY | Facility: HOSPITAL | Age: 44
End: 2024-01-05

## 2024-01-05 ENCOUNTER — SOCIAL WORK (OUTPATIENT)
Dept: CASE MANAGEMENT | Facility: HOSPITAL | Age: 44
End: 2024-01-05

## 2024-01-05 ASSESSMENT — ENCOUNTER SYMPTOMS
APPETITE CHANGE: 1
ADENOPATHY: 0
FATIGUE: 1
UNEXPECTED WEIGHT CHANGE: 0
VOMITING: 0
SORE THROAT: 1
COUGH: 0
NECK PAIN: 0
FEVER: 0
SHORTNESS OF BREATH: 0
TROUBLE SWALLOWING: 1
FACIAL SWELLING: 0
STRIDOR: 0
CHILLS: 0
NAUSEA: 0
VOICE CHANGE: 0

## 2024-01-05 NOTE — PROGRESS NOTES
Cancer Follow up:  TSH: Due now    Chief Complaint   Patient presents with    Follow-up     HPI:  Shayne Messer is a 43 year old male following up with me today for his tonsil cancer. Last seen 11/23. He completed chemoradiation therapy 2/28/23. He has been following with Supportive oncology as well as Dr. Burkitt. He continues to struggle.  +dry mouth (severe).  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  He has been sick for the last several months and notes congestion, decreased taste,  +yellow and bloody discharge from both ears with decreased hearing.  He has bilateral PE tubes in place.      History:  Dx: T2N2M0 Right tonsil cancer, P16+  [facility]22[facility] with newly discovered right tonsil mass - discharged home  11/22/22: S/p triple endoscopy with biopsy by Dr. Murphy. Found to have right tonsil mass involving the soft palate. Exposure was difficult and it was hard to get a biopsy. Biopsy did return +SCCa, P16+.  12/22: First seen by me  1/11/23: Started chemoradiation therapy  2/28/23: Completed chemoradiation therapy  5/2/23: +in half-way, seen in ED for possible drug   5/11/23: psychiatric eval  5/17/23: PE tubes placed  6/23: PET: Complete response to tx  8/23: MBS +dysphagia recommend therapy  9/23: CT neck negative for recurrent masses, level 2 LN stable    ROS:  Review of Systems   Constitutional:  Positive for appetite change and fatigue. Negative for chills, fever and unexpected weight change.   HENT:  Positive for dental problem, ear discharge, hearing loss, rhinorrhea, sore throat, tinnitus and trouble swallowing. Negative for drooling, ear pain, facial swelling, mouth sores and voice change.    Respiratory:  Negative for cough, shortness of breath and stridor.    Gastrointestinal:  Negative for nausea and vomiting.   Musculoskeletal:  Negative for neck pain.   Hematological:  Negative for adenopathy.   All other systems reviewed and are negative.       PE:  ENT Physical  Exam  Constitutional  Appearance: patient appears well-developed, grooming disheveled;  Communication/Voice: communication appropriate for developmental age; voice quality is hoarse and rough;  Head and Face  Appearance: head appears normal and face appears normal;  Salivary: glands normal;  Head and Face comments: Swelling of right body of mandible  Ear  Auricles: right auricle normal; left auricle normal;  Ear Canals: right ear canal normal; left ear canal normal;  Tympanic Membranes: bilateral tympanic membranes tympanostomy tubes noted;  Ear comments: Bilateral tubes appear patent however yellowish discharge sitting around tympanic membrane but no purulence  Nose  Internal Nose: nasal mucosa normal; septum normal;  Oral Cavity/Oropharynx  Teeth: tooth decay (+broken teeth, no purulence or active odontogenic infection) noted;  Gums: gingiva normal;  Tongue: normal;  Oral mucosa: mucous membranes dry (moderate to severe);  OC/OP comments: Oropharynx is soft/no visible or palpable masses or lesions  Neck  Neck: radiation changes present;  Respiratory  Inspection: breathing unlabored;  Cardiovascular  Inspection: extremities are warm and well perfused;  Neurovestibular  Mental Status: alert and oriented;  Psychiatric: mood depressed; affect is flat;       Ear cerumen removal    Date/Time: 1/10/2024 2:54 PM    Performed by: Anette Jerome MD  Authorized by: Anette Jerome MD    Consent:     Consent obtained:  Verbal    Consent given by:  Patient  Universal protocol:     Patient identity confirmed:  Verbally with patient  Procedure details:     Location:  L ear and R ear    Procedure type: irrigation    Post-procedure details:     Hearing quality:  Diminished    Procedure completion:  Tolerated well, no immediate complications     PROCEDURE NOTE:  Recommended flexible nasopharyngoscopy & laryngoscopy.  Risks, benefits, personnel and alternatives were explained.  The patient wished to proceed.  S/he was  re-identified.  PROCEDURE:  Flexible nasopharyngoscopy and laryngoscopy  PREOPERATIVE DIAGNOSIS: oropharyngeal cancer  POSTOPERATIVE DIAGNOSIS: Same as above, no recurrent lesions  INDICATIONS: Inability to tolerate mirror exam  ANESTHESIA: 4% lidocaine and 0.5% phenylephrine  PROCEDURE:  With the patient sitting upright topical anesthesia and vasoconstriction was applied with spray to the right side(s) of the nose.  After waiting an appropriate period of time for anesthesia/vasoconstriction to become effective, a flexible laryngoscope was passed through the right side(s) of the nose.  Nasopharynx, oropharynx, hypopharynx and larynx were examined.  FINDINGS:  The nasopharynx and oropharynx were normal without any lesions or masses visualized.  No masses or lesions were visualized at the base of tongue, vallecula, epiglottis, aryepiglottic folds, pyriform sinuses, and lateral pharyngeal walls.  Mild radiation changes.  True vocal fold movement was normal.  The patient's airway was widely patent with no evidence of obstruction.  Patient tolerated the procedure well, and there were no complications.     ASSESSMENT AND PLAN:  Problem List Items Addressed This Visit       Anxiety and depression    Current Assessment & Plan     Continue supportive oncology treatment         Malignant neoplasm of tonsil (CMS/HCC)    Current Assessment & Plan     No evidence of disease on exam or endoscopy  Follow up in 3 months         Oropharyngeal dysphagia    Current Assessment & Plan     Chronic, moderate, adverse effect of radiation  Continue current diet  Recommend continued therapy with SLP           Chronic suppurative otitis media of both ears - Primary    Current Assessment & Plan     Continues to have bilateral CSOM  PE tubes in place with chronic effusions  Endoscopy today is negative for any nasopharyngeal masses  Likely chronic effect of radiation  May need to consider culture or otology referral  Suctioning performed today  with some relief but only partial  Recommend otic drops as prescribed         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    Current Assessment & Plan     Moderate to severe, chronic, adverse effect of radiation  Recommend conservative tx         Poor dentition    Current Assessment & Plan     Had an extensive discussion regarding dental school and dental extractions  He likely requires full dental extractions         Acute effusion of both middle ears     Poor dentition   Referral to dental school, awaiting call back for scheduling  Will likely require dental extractions  Continue routine dental care    Anette Jerome MD    Head & Neck Surgical Oncology & Reconstruction  Department of Otolaryngology - Head and Neck Surgery     By signing my name below, I, Jeovany Vaile, attest that this documentation has been prepared under the direction and in the presence of Dr. Anette Jerome MD.     All medical record entries made by the Scribe were at my direction and personally dictated by me, Dr. Anette Jerome. I have reviewed the chart and agree that the record accurately reflects my personal performance of the history, physical exam, discussion and plan.

## 2024-01-05 NOTE — PROGRESS NOTES
LSW has scheduled roundtrip transportation for patient's appointment on 1/10/24 and 1/17/24 through [employer]. Confirmation of both trips have been sent to patient's cellphone number [phone].

## 2024-01-05 NOTE — TELEPHONE ENCOUNTER
"Spoke to patient. He said he has had sinus symptoms of constant stuffiness drainage, ear drainage, and some loss of taste have been going on he states for 3 months. Dr. Jerome prescribed ear drops at his last visit in November, however he states he was never able to pick them up as his pharmacy did not have them and says they are back ordered. He states he has been trying to call us \"for months\" regarding this. Our office has not received any/calls messages from patient. I asked if there was another pharmacy we could send the ear drops to. He says he is not able to get to another pharmacy besides that [facility]. I have added him onto the schedule for Wednesday 1/10/24 at 2 pm and we discussed that we could call meds into [facility] pharmacy while he was here so he could just pick them up while he was in the hospital.   "

## 2024-01-10 ENCOUNTER — PHARMACY VISIT (OUTPATIENT)
Dept: PHARMACY | Facility: CLINIC | Age: 44
End: 2024-01-10
Payer: MEDICAID

## 2024-01-10 ENCOUNTER — TELEPHONE (OUTPATIENT)
Dept: PALLIATIVE MEDICINE | Facility: HOSPITAL | Age: 44
End: 2024-01-10

## 2024-01-10 ENCOUNTER — NUTRITION (OUTPATIENT)
Dept: HEMATOLOGY/ONCOLOGY | Facility: HOSPITAL | Age: 44
End: 2024-01-10

## 2024-01-10 ENCOUNTER — OFFICE VISIT (OUTPATIENT)
Dept: OTOLARYNGOLOGY | Facility: HOSPITAL | Age: 44
End: 2024-01-10
Payer: MEDICAID

## 2024-01-10 VITALS — WEIGHT: 164.6 LBS | BODY MASS INDEX: 24.38 KG/M2 | HEIGHT: 69 IN | TEMPERATURE: 97 F

## 2024-01-10 DIAGNOSIS — H66.3X3 CHRONIC SUPPURATIVE OTITIS MEDIA OF BOTH EARS, UNSPECIFIED OTITIS MEDIA LOCATION: Primary | ICD-10-CM

## 2024-01-10 DIAGNOSIS — H65.193 ACUTE EFFUSION OF BOTH MIDDLE EARS: ICD-10-CM

## 2024-01-10 DIAGNOSIS — C77.0 SECONDARY AND UNSPECIFIED MALIGNANT NEOPLASM OF LYMPH NODES OF HEAD, FACE AND NECK (MULTI): ICD-10-CM

## 2024-01-10 DIAGNOSIS — K08.9 POOR DENTITION: ICD-10-CM

## 2024-01-10 DIAGNOSIS — Z79.891 ENCOUNTER FOR MONITORING OPIOID MAINTENANCE THERAPY: ICD-10-CM

## 2024-01-10 DIAGNOSIS — F25.1 SCHIZOAFFECTIVE DISORDER, DEPRESSIVE TYPE (MULTI): ICD-10-CM

## 2024-01-10 DIAGNOSIS — F41.9 ANXIETY AND DEPRESSION: ICD-10-CM

## 2024-01-10 DIAGNOSIS — Z51.81 ENCOUNTER FOR MONITORING OPIOID MAINTENANCE THERAPY: ICD-10-CM

## 2024-01-10 DIAGNOSIS — Z51.5 ENCOUNTER FOR PALLIATIVE CARE: ICD-10-CM

## 2024-01-10 DIAGNOSIS — T40.2X1A OPIOID OVERDOSE, ACCIDENTAL OR UNINTENTIONAL, INITIAL ENCOUNTER (MULTI): ICD-10-CM

## 2024-01-10 DIAGNOSIS — G89.3 NEOPLASM RELATED PAIN: ICD-10-CM

## 2024-01-10 DIAGNOSIS — C09.9 MALIGNANT NEOPLASM OF TONSIL (MULTI): ICD-10-CM

## 2024-01-10 DIAGNOSIS — F32.A ANXIETY AND DEPRESSION: ICD-10-CM

## 2024-01-10 DIAGNOSIS — K11.7 XEROSTOMIA DUE TO RADIOTHERAPY: ICD-10-CM
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
## 2024-01-10 DIAGNOSIS — R13.12 OROPHARYNGEAL DYSPHAGIA: ICD-10-CM

## 2024-01-10 PROCEDURE — 99214 OFFICE O/P EST MOD 30 MIN: CPT | Performed by: OTOLARYNGOLOGY

## 2024-01-10 PROCEDURE — 92511 NASOPHARYNGOSCOPY: CPT | Performed by: OTOLARYNGOLOGY

## 2024-01-10 PROCEDURE — RXMED WILLOW AMBULATORY MEDICATION CHARGE

## 2024-01-10 PROCEDURE — 69209 REMOVE IMPACTED EAR WAX UNI: CPT | Performed by: OTOLARYNGOLOGY

## 2024-01-10 PROCEDURE — 3008F BODY MASS INDEX DOCD: CPT | Performed by: OTOLARYNGOLOGY

## 2024-01-10 RX ORDER — NEOMYCIN SULFATE, POLYMYXIN B SULFATE, HYDROCORTISONE 3.5; 10000; 1 MG/ML; [USP'U]/ML; MG/ML
3 SOLUTION/ DROPS AURICULAR (OTIC) 4 TIMES DAILY
Qty: 10 ML | Refills: 0 | Status: SHIPPED | OUTPATIENT
Start: 2024-01-10 | End: 2024-01-20

## 2024-01-10 RX ORDER — METHADONE HYDROCHLORIDE 10 MG/1
20 TABLET ORAL EVERY 8 HOURS
Qty: 180 TABLET | Refills: 0 | Status: SHIPPED | OUTPATIENT
Start: 2024-01-10 | End: 2024-02-08 | Stop reason: SDUPTHER

## 2024-01-10 ASSESSMENT — PATIENT HEALTH QUESTIONNAIRE - PHQ9
SUM OF ALL RESPONSES TO PHQ9 QUESTIONS 1 & 2: 3
2. FEELING DOWN, DEPRESSED OR HOPELESS: MORE THAN HALF THE DAYS
1. LITTLE INTEREST OR PLEASURE IN DOING THINGS: SEVERAL DAYS

## 2024-01-10 ASSESSMENT — ENCOUNTER SYMPTOMS: RHINORRHEA: 1

## 2024-01-10 NOTE — TELEPHONE ENCOUNTER
Received message from oncology team that patient seen today in clinic and voiced he needs refill of Methadone. OARRS report reviewed and reflects  prescription history, no aberrancy noted. Per OARRS, patient due for medication. Per last visit with Elissa Tracy CNP on 12/7/23 patient to continue Methadone 20mg TID. Patient does not have FUV scheduled but per last note, to be seen in Feb. Also per our shared calendar I see a note written on 1/25 to follow up with scheduling patient. Refill request routed to provider. I tried to call patient x3 and received busy signal.

## 2024-01-11 ENCOUNTER — TELEPHONE (OUTPATIENT)
Dept: ADMISSION | Facility: HOSPITAL | Age: 44
End: 2024-01-11
Payer: MEDICAID

## 2024-01-11 RX ORDER — POLYETHYLENE GLYCOL 3350 17 G/17G
17 POWDER, FOR SOLUTION ORAL DAILY PRN
COMMUNITY
End: 2024-02-08 | Stop reason: SDUPTHER

## 2024-01-11 NOTE — TELEPHONE ENCOUNTER
Pt left a message requesting refills on pilocarpine 5mg TID prn, miralax 17gm daily prn and methadone which is noted to have been filled yesterday.  Preferred pharmacy is [facility] on [address]

## 2024-01-11 NOTE — TELEPHONE ENCOUNTER
Call returned to Shayne to let him know Methadone was sent yesterday and miralax and pilocarpine both have refills.

## 2024-01-17 ENCOUNTER — CONSULT (OUTPATIENT)
Dept: BEHAVIORAL HEALTH | Facility: HOSPITAL | Age: 44
End: 2024-01-17
Payer: MEDICAID

## 2024-01-17 ENCOUNTER — NUTRITION (OUTPATIENT)
Dept: HEMATOLOGY/ONCOLOGY | Facility: HOSPITAL | Age: 44
End: 2024-01-17
Payer: MEDICAID

## 2024-01-17 VITALS
HEART RATE: 87 BPM | DIASTOLIC BLOOD PRESSURE: 72 MMHG | BODY MASS INDEX: 23.99 KG/M2 | RESPIRATION RATE: 18 BRPM | OXYGEN SATURATION: 97 % | TEMPERATURE: 96.8 F | WEIGHT: 162.48 LBS | SYSTOLIC BLOOD PRESSURE: 116 MMHG

## 2024-01-17 DIAGNOSIS — Z51.5 PALLIATIVE CARE ENCOUNTER: ICD-10-CM

## 2024-01-17 DIAGNOSIS — F14.90 COCAINE USE: ICD-10-CM

## 2024-01-17 DIAGNOSIS — F31.32 BIPOLAR AFFECTIVE DISORDER, CURRENTLY DEPRESSED, MODERATE (MULTI): ICD-10-CM

## 2024-01-17 DIAGNOSIS — F12.90 CANNABIS USE, UNCOMPLICATED: ICD-10-CM

## 2024-01-17 DIAGNOSIS — F10.20 ALCOHOL USE DISORDER, SEVERE, DEPENDENCE (MULTI): ICD-10-CM

## 2024-01-17 DIAGNOSIS — F31.9 BIPOLAR DEPRESSION (MULTI): ICD-10-CM

## 2024-01-17 DIAGNOSIS — F15.10 METHAMPHETAMINE ABUSE (MULTI): ICD-10-CM

## 2024-01-17 PROCEDURE — 99215 OFFICE O/P EST HI 40 MIN: CPT | Mod: AM | Performed by: PSYCHIATRY & NEUROLOGY

## 2024-01-17 PROCEDURE — 99205 OFFICE O/P NEW HI 60 MIN: CPT | Performed by: PSYCHIATRY & NEUROLOGY

## 2024-01-17 RX ORDER — SERTRALINE HYDROCHLORIDE 50 MG/1
50 TABLET, FILM COATED ORAL DAILY
Qty: 30 TABLET | Refills: 11 | Status: SHIPPED | OUTPATIENT
Start: 2024-01-17 | End: 2024-02-14 | Stop reason: SDUPTHER

## 2024-01-17 ASSESSMENT — PAIN SCALES - GENERAL: PAINLEVEL: 6

## 2024-01-17 ASSESSMENT — ENCOUNTER SYMPTOMS
DEPRESSION: 0
OCCASIONAL FEELINGS OF UNSTEADINESS: 0
LOSS OF SENSATION IN FEET: 0
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
## 2024-01-17 NOTE — PROGRESS NOTES
NUTRITION COMMUNICATION NOTE    Shayne Messer     REASON FOR COMMUNICATION:     Shayne called today.  He was concerned that he had received his shipment from [employer].  This was ordered 1/10/2024.  I then called [place] and was reminded he was not due for a re-order until 1-.  I was also informed nothing was shipped on 1/15/2024 in observance of the Holiday.  He will receive his shipments today  Patient was called back and made aware- he was relieved as he was concerned that a neighbor may have taken.    Will follow         Time Spent  Prep time on day of patient encounter: 5 minutes  Time spent directly with patient, family or caregiver: 10 minutes  Additional Time Spent on Patient Care Activities: 5 minutes  Documentation Time: 5 minutes  Other Time Spent: 0 minutes  Total: 25 minutes

## 2024-01-25 PROBLEM — C77.0 SECONDARY AND UNSPECIFIED MALIGNANT NEOPLASM OF LYMPH NODES OF HEAD, FACE AND NECK (MULTI): Status: ACTIVE | Noted: 2024-01-25

## 2024-01-26 NOTE — ASSESSMENT & PLAN NOTE
Had an extensive discussion regarding dental school and dental extractions  He likely requires full dental extractions

## 2024-01-26 NOTE — ASSESSMENT & PLAN NOTE
Moderate to severe, chronic, adverse effect of radiation  Recommend conservative tx  Continue sips of water, recommend humidification, xylitol etc  He is already on pilocarpine and conservative therapy

## 2024-01-26 NOTE — ASSESSMENT & PLAN NOTE
Continues to have bilateral CSOM  PE tubes in place with chronic effusions  Endoscopy today is negative for any nasopharyngeal masses  Likely chronic effect of radiation  May need to consider culture or otology referral  Suctioning performed today with some relief but only partial  Recommend polymixin drops bilaterally x1 week  Call if drainage doesn't resolve

## 2024-01-26 NOTE — ASSESSMENT & PLAN NOTE
No evidence of disease on exam or endoscopy  Discussed NavDx testing  Encouragement provided  Supportive oncology services engaged  He is suffering from significant side effects from treatment as above and reviewed these and strategies  Follow up in 3 months

## 2024-01-26 NOTE — ASSESSMENT & PLAN NOTE
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

## 2024-02-05 ENCOUNTER — APPOINTMENT (OUTPATIENT)
Dept: OTOLARYNGOLOGY | Facility: CLINIC | Age: 44
End: 2024-02-05
Payer: MEDICAID

## 2024-02-08 ENCOUNTER — LAB (OUTPATIENT)
Dept: LAB | Facility: HOSPITAL | Age: 44
End: 2024-02-08
Payer: MEDICAID

## 2024-02-08 ENCOUNTER — OFFICE VISIT (OUTPATIENT)
Dept: HEMATOLOGY/ONCOLOGY | Facility: HOSPITAL | Age: 44
End: 2024-02-08
Payer: MEDICAID

## 2024-02-08 ENCOUNTER — TELEPHONE (OUTPATIENT)
Dept: PALLIATIVE MEDICINE | Facility: CLINIC | Age: 44
End: 2024-02-08

## 2024-02-08 VITALS
HEIGHT: 69 IN | DIASTOLIC BLOOD PRESSURE: 67 MMHG | OXYGEN SATURATION: 100 % | TEMPERATURE: 97 F | BODY MASS INDEX: 24.24 KG/M2 | HEART RATE: 64 BPM | RESPIRATION RATE: 18 BRPM | SYSTOLIC BLOOD PRESSURE: 115 MMHG | WEIGHT: 163.7 LBS

## 2024-02-08 DIAGNOSIS — C76.0 HEAD AND NECK CANCER (MULTI): ICD-10-CM

## 2024-02-08 DIAGNOSIS — Z51.5 ENCOUNTER FOR PALLIATIVE CARE: ICD-10-CM

## 2024-02-08 DIAGNOSIS — Z51.81 ENCOUNTER FOR MONITORING OPIOID MAINTENANCE THERAPY: ICD-10-CM

## 2024-02-08 DIAGNOSIS — Z79.891 ENCOUNTER FOR MONITORING OPIOID MAINTENANCE THERAPY: ICD-10-CM

## 2024-02-08 DIAGNOSIS — C76.0 HEAD AND NECK CANCER (MULTI): Primary | ICD-10-CM

## 2024-02-08 DIAGNOSIS — G89.3 NEOPLASM RELATED PAIN: ICD-10-CM

## 2024-02-08 LAB
ALBUMIN SERPL BCP-MCNC: 4.1 G/DL (ref 3.4–5)
ALP SERPL-CCNC: 67 U/L (ref 33–120)
ALT SERPL W P-5'-P-CCNC: 10 U/L (ref 10–52)
ANION GAP SERPL CALC-SCNC: 10 MMOL/L (ref 10–20)
AST SERPL W P-5'-P-CCNC: 14 U/L (ref 9–39)
BASOPHILS # BLD AUTO: 0.03 X10*3/UL (ref 0–0.1)
BASOPHILS NFR BLD AUTO: 0.6 %
BILIRUB SERPL-MCNC: 0.4 MG/DL (ref 0–1.2)
BUN SERPL-MCNC: 16 MG/DL (ref 6–23)
CALCIUM SERPL-MCNC: 9.4 MG/DL (ref 8.6–10.3)
CARDIAC TROPONIN I PNL SERPL HS: <3 NG/L (ref 0–53)
CHLORIDE SERPL-SCNC: 103 MMOL/L (ref 98–107)
CO2 SERPL-SCNC: 29 MMOL/L (ref 21–32)
CREAT SERPL-MCNC: 0.77 MG/DL (ref 0.5–1.3)
EGFRCR SERPLBLD CKD-EPI 2021: >90 ML/MIN/1.73M*2
EOSINOPHIL # BLD AUTO: 0.4 X10*3/UL (ref 0–0.7)
EOSINOPHIL NFR BLD AUTO: 8 %
ERYTHROCYTE [DISTWIDTH] IN BLOOD BY AUTOMATED COUNT: 12.7 % (ref 11.5–14.5)
GLUCOSE SERPL-MCNC: 95 MG/DL (ref 74–99)
HCT VFR BLD AUTO: 44.3 % (ref 41–52)
HGB BLD-MCNC: 14.9 G/DL (ref 13.5–17.5)
IMM GRANULOCYTES # BLD AUTO: 0.02 X10*3/UL (ref 0–0.7)
IMM GRANULOCYTES NFR BLD AUTO: 0.4 % (ref 0–0.9)
LYMPHOCYTES # BLD AUTO: 0.61 X10*3/UL (ref 1.2–4.8)
LYMPHOCYTES NFR BLD AUTO: 12.2 %
MCH RBC QN AUTO: 31.8 PG (ref 26–34)
MCHC RBC AUTO-ENTMCNC: 33.6 G/DL (ref 32–36)
MCV RBC AUTO: 95 FL (ref 80–100)
MONOCYTES # BLD AUTO: 0.43 X10*3/UL (ref 0.1–1)
MONOCYTES NFR BLD AUTO: 8.6 %
NEUTROPHILS # BLD AUTO: 3.52 X10*3/UL (ref 1.2–7.7)
NEUTROPHILS NFR BLD AUTO: 70.2 %
NRBC BLD-RTO: 0 /100 WBCS (ref 0–0)
PLATELET # BLD AUTO: 204 X10*3/UL (ref 150–450)
POTASSIUM SERPL-SCNC: 4.4 MMOL/L (ref 3.5–5.3)
PROT SERPL-MCNC: 7.3 G/DL (ref 6.4–8.2)
RBC # BLD AUTO: 4.69 X10*6/UL (ref 4.5–5.9)
SODIUM SERPL-SCNC: 138 MMOL/L (ref 136–145)
WBC # BLD AUTO: 5 X10*3/UL (ref 4.4–11.3)

## 2024-02-08 PROCEDURE — 85025 COMPLETE CBC W/AUTO DIFF WBC: CPT

## 2024-02-08 PROCEDURE — 3074F SYST BP LT 130 MM HG: CPT | Performed by: INTERNAL MEDICINE

## 2024-02-08 PROCEDURE — 36415 COLL VENOUS BLD VENIPUNCTURE: CPT

## 2024-02-08 PROCEDURE — 99215 OFFICE O/P EST HI 40 MIN: CPT | Performed by: INTERNAL MEDICINE

## 2024-02-08 PROCEDURE — 3008F BODY MASS INDEX DOCD: CPT | Performed by: INTERNAL MEDICINE

## 2024-02-08 PROCEDURE — 84484 ASSAY OF TROPONIN QUANT: CPT | Performed by: EMERGENCY MEDICINE

## 2024-02-08 PROCEDURE — 3078F DIAST BP <80 MM HG: CPT | Performed by: INTERNAL MEDICINE

## 2024-02-08 PROCEDURE — 80053 COMPREHEN METABOLIC PANEL: CPT

## 2024-02-08 RX ORDER — PILOCARPINE HYDROCHLORIDE 5 MG/1
5 TABLET, FILM COATED ORAL 3 TIMES DAILY PRN
Qty: 90 TABLET | Refills: 2 | Status: SHIPPED | OUTPATIENT
Start: 2024-02-08 | End: 2024-02-09 | Stop reason: SDUPTHER

## 2024-02-08 RX ORDER — POLYETHYLENE GLYCOL 3350 17 G/17G
17 POWDER, FOR SOLUTION ORAL DAILY PRN
Qty: 510 G | Refills: 3 | Status: SHIPPED | OUTPATIENT
Start: 2024-02-08 | End: 2024-02-09 | Stop reason: SDUPTHER

## 2024-02-08 RX ORDER — METHADONE HYDROCHLORIDE 10 MG/1
20 TABLET ORAL EVERY 8 HOURS
Qty: 180 TABLET | Refills: 0 | Status: SHIPPED | OUTPATIENT
Start: 2024-02-08 | End: 2024-02-09 | Stop reason: SDUPTHER

## 2024-02-08 ASSESSMENT — PAIN SCALES - GENERAL: PAINLEVEL: 5

## 2024-02-08 NOTE — TELEPHONE ENCOUNTER
OARRS reviewed and no aberrancy noted. Prescription pended to provider to approve. VV with Elissa Tracy 2/14.  All 3 refills going to [facility]. I am unable to reach patient at number listed in his EMR to update.

## 2024-02-08 NOTE — PROGRESS NOTES
Patient ID: Shayne Messer is a 43 y.o. male.    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 Kyunghee Burkitt  Rad/onc: Dr. Valdez  ENT: Dr Jerome  Treatment:  - cisplatin 100mg/m2 q3wk with RT     Oncology history   - 11/11/2022: enlarging right tonsillar mass and RT neck mass x 6 months  - 11/22/2022: triple scope, Enlarged and indurated right tonsil with extension of abnormal appearing tissue onto the soft palate. No extension to BOT and no separate lesions or masses identified; biopsy of right tonsillar mass: invasive p16+ SCC       Past Medical History:   Past Medical History:  No date: Bipolar 1 disorder (CMS/HCC)  No date: Immunization not carried out for unspecified reason      Comment:  COVID-19 vaccination not done  No date: Other specified abnormal findings of blood chemistry      Comment:  High serum cholestanol   Surgical History:    No past surgical history on file.   Family History:    No family history on file.  Family Oncology History:    Cancer-related family history is not on file.  Social History:    Social History     Tobacco Use    Smoking status: Former     Types: Cigarettes    Smokeless tobacco: Current    Tobacco comments:     Vaping.   Substance Use Topics    Alcohol use: Never    Drug use: Yes     Types: Marijuana          Subjective   Chief Complaint:     HPI  -He reports  his mouth pain is slightly better, but pain is still there mostly back of mouth, currently on Methadone  -His wt is stable, he takes protein drink every day, still can't eat solid food.  -He still has sever dry mouth, hard to chew.    -He also has lots of broken teeth.  He is still in process of getting teeth removal  -Denies dysphagia, SOB, fever, chills.      PmHx:  Depression with suicidal ideation in the past requiring hospital admission.  Nothing that he reports in the last 6 months.    Cocaine abuse (snort), currently sober.  Daily smoker.  "  Self-reported ADHD.       ROS  Review of Systems - Oncology    Allergies  No Known Allergies     Medications  Current Outpatient Medications   Medication Instructions    albuterol 90 mcg/actuation inhaler INHALE 2 PUFFS BY MOUTH EVERY 4 HOURS AS NEEDED. USE WITH SPACER    BMX ORAL SUSPENSION (1:1:1) Swish and swallow 10ml by mouth every 6 hours if needed for mucositis    hydrOXYzine HCL (Atarax) 25 mg tablet     hydrOXYzine HCL (ATARAX) 50 mg, oral, 3 times daily PRN    ibuprofen 400 mg, oral, Every 8 hours    magic mouthwash (lidocaine, diphenhydrAMINE, Maalox 1:1:1) 10 mL, Swish & Swallow, Every 6 hours PRN    methadone (DOLOPHINE) 20 mg, oral, Every 8 hours, In addition to 5mg for a total of 15mg    naloxone (NARCAN) 4 mg, nasal, As needed, May repeat every 2-3 minutes if needed, alternating nostrils, until medical assistance becomes available.    polyethylene glycol (MIRALAX) 17 g, oral, Daily PRN    sertraline (ZOLOFT) 50 mg, oral, Daily    Stimulant Laxative Plus 8.6-50 mg tablet           Objective   VS: /67 (BP Location: Left arm, Patient Position: Sitting)   Pulse 64   Temp 36.1 °C (97 °F) (Temporal)   Resp 18   Ht 1.763 m (5' 9.41\")   Wt 74.3 kg (163 lb 11.2 oz)   SpO2 100%   BMI 23.89 kg/m²   Weight: Daily Weight  02/08/24 : 74.3 kg (163 lb 11.2 oz)  01/17/24 : 73.7 kg (162 lb 7.7 oz)  01/10/24 : 74.7 kg (164 lb 9.6 oz)  12/07/23 : 72.6 kg (160 lb 0.9 oz)  11/15/23 : 69.9 kg (154 lb 3.2 oz)  11/09/23 : 68.3 kg (150 lb 9.2 oz)  10/12/23 : 70.8 kg (156 lb)      Physical Exam  Constitutional:       Appearance: Normal appearance.   HENT:      Head: Normocephalic and atraumatic.      Mouth/Throat:      Mouth: Mucous membranes are moist.      Comments: Multiple rotten and broken bottom teeth.  Eyes:      Extraocular Movements: Extraocular movements intact.      Pupils: Pupils are equal, round, and reactive to light.   Cardiovascular:      Rate and Rhythm: Normal rate and regular rhythm. "   Pulmonary:      Effort: Pulmonary effort is normal.      Breath sounds: Normal breath sounds.   Musculoskeletal:         General: Normal range of motion.      Cervical back: Normal range of motion and neck supple.   Skin:     General: Skin is warm.   Neurological:      General: No focal deficit present.      Mental Status: He is alert and oriented to person, place, and time.       Diagnostic Results   Labs  Below labs are reviewed today.  Results from last 7 days   Lab Units 02/08/24  1252   WBC AUTO x10*3/uL 5.0   HEMOGLOBIN g/dL 14.9   HEMATOCRIT % 44.3   PLATELETS AUTO x10*3/uL 204   NEUTROS ABS x10*3/uL 3.52   LYMPHS ABS AUTO x10*3/uL 0.61*   MONOS ABS AUTO x10*3/uL 0.43   EOS ABS AUTO x10*3/uL 0.40   NEUTROS PCT AUTO % 70.2   LYMPHS PCT AUTO % 12.2   MONOS PCT AUTO % 8.6   EOS PCT AUTO % 8.0      Results from last 7 days   Lab Units 02/08/24  1252   GLUCOSE mg/dL 95   SODIUM mmol/L 138   POTASSIUM mmol/L 4.4   CHLORIDE mmol/L 103   CO2 mmol/L 29   BUN mg/dL 16   CREATININE mg/dL 0.77   EGFR mL/min/1.73m*2 >90   CALCIUM mg/dL 9.4   ALBUMIN g/dL 4.1   PROTEIN TOTAL g/dL 7.3   BILIRUBIN TOTAL mg/dL 0.4   ALK PHOS U/L 67   ALT U/L 10   AST U/L 14                   Images       Pathology  Lab Results   Component Value Date    PATH[facility]P  11/22/2022     Name BABAK GROSS                                                                                                   Accession #: [id number]            Pathologist:                   WADAD S. MNEIMNEH, MD  Date of Procedure:    11/22/2022  Date Received:          11/22/2022  Date Reported           11/28/2022  Submitting Physician:   OLGA KOEHLER MD  Location:                    [facility]  Other External #                                                                    FINAL DIAGNOSIS  A.  RIGHT TONSIL, BIOPSY:    -- INVASIVE P16-POSITIVE SQUAMOUS CELL CARCINOMA, MINIMALLY REPRESENTED (SEE  NOTE).    Note: Minute foci of nonkeratinizing squamous cell carcinoma are  noted,  strongly diffusely positive for p16 immunostain.    P16 by immunohistochemistry:  Positive    Reference Range:  Negative:  <50% strong nuclear and cytoplasmic invasive tumor cell staining  Equivocal: 50-70% strong nuclear and cytoplasmic invasive tumor cell staining   Positive: >70% strong nuclear and cytoplasmic invasive tumor cell staining     Findings received by Dr Murphy on Nov 24, 2022, at 6:50 AM    B.  SUPERIOR RIGHT TONSIL, BIOPSY:  -- SQUAMOUS MUCOSA AND ASSOCIATED LYMPHOID TISSUE WITH FOLLICULAR HYPERPLASIA.   -- NO CARCINOMA IDENTIFIED.    Note: Immunostain for p16 performed on block B1 shows focal, patchy, weak  nonspecific staining in the squamous epithelium.       C.  RIGHT TONSIL, BIOPSY # 1:    -- SQUAMOUS MUCOSA AND ASSOCIATED LYMPHOID TISSUE WITH FOLLICULAR HYPERPLASIA  (SEE NOTE).  -- NO CARCINOMA IDENTIFIED.    Note: Multiple levels were examined.    D.  RIGHT TONSIL BIOPSY # 2:    -- SQUAMOUS MUCOSA WITH FOCAL LYMPHOID TISSUE.  -- NO CARCINOMA IDENTIFIED.                                                                                                                                                                                                                                                                                                                                                                                                                                                                                       Electronically Signed Out By WADAD S. MNEIMNEH, MD/DUNG  By the signature on this report, the individual or group listed as making the  Final Interpretation/Diagnosis certifies that they have reviewed this case.  Diagnostic interpretation performed at [facility] ([place] [address] [address] [facility] [address]         Intraoperative Consultation:  A: RIGHT TONSIL  BIOPSY    Frozen Section 1:  Date Ordered: 11/22/2022 08:59     Date Received: 11/22/2022 08:59      "Date  Called: 11/22/2022 09:38    Intraoperative Diagnosis:  A) Right tonsil biopsy (FSA1): Small detached fragment of atypical cells,  suspicious for carcinoma but not diagnostic; requested additional tissue.    : Dr. Mckenzie Cornejo    Intraoperative Consult Pathologist(s):  VENECIA KEYS MD (P)    B: SUPERIOR RIGHT TONSIL    Frozen Section 1:  Date Ordered: 11/22/2022 09:46     Date Received: 11/22/2022 09:46     Date  Called: 11/22/2022 10:02    Intraoperative Diagnosis:  B) Superior right tonsil (FSB1): Benign tonsillar tissue.  Intraoperative Consult Pathologist(s):  VENECIA KEYS MD (P)      [facility]/11/22/2022           Clinical History:  Squamous cell carcinoma    Specimens Submitted As:  A: RIGHT TONSIL  BIOPSY   B: SUPERIOR RIGHT TONSIL   C: RIGHT TONSIL BIOPSY # 1   D: RIGHT TONSIL BIOPSY # 2     Gross Description:  A. Received fresh for intraoperative consultation, labeled with the patient's  name and hospital number and \"right tonsil biopsy\", are multiple tan-red soft  tissue fragments measuring in aggregate 0.8 x 0.8 x 0.2 cm. The specimen is  entirely submitted for intraoperative diagnosis and then for permanent in one  cassette, A1 FSC. RXA/RXH    B. Received fresh for intraoperative consultation, labeled with the patient's  name and hospital number and \"superior right tonsil\", are multiple tan-red soft  tissue fragments measuring in aggregate 0.7 x 0.5 x 0.2 cm. The specimen is  entirely submitted for intraoperative diagnosis and then for permanent in one  cassette, B1 FSC. RXA/RXH    C: Received in formalin, labeled with the patient's name and hospital number  and \"right tonsil biopsy #1\", is a tan soft tissue fragment measuring 0.4 x 0.4  x 0.2 cm. The specimen is submitted in toto in one cassette, C1. SBS    D:  Received in formalin, labeled with the patient's name and hospital number  and \"right tonsil biopsy #2\", is a tan soft tissue fragment measuring 0.9 x 0.7  x 0.5 " cm. The specimen is submitted in toto in one cassette, D1. SBS      [facility]/11/22/2022           The assays/tests were performed with appropriate positive and negative controls  which stained appropriately.    [facility]  Department of Pathology   [address] [place] [address]        PATHREP  09/02/2016     Name BABAK GROSS                                                                                                   Accession #: [id number]            Pathologist:                     Date of Procedure:    9/2/2016  Date Received:          9/6/2016  Date Reported           9/7/2016  Submitting Physician:     REQUESTING PROVIDER: PRITI العلي  Location:                      Other External #                                                                    FINAL DIAGNOSIS  DIAGNOSIS:  (A)  Biopsy of esophagus:     -  Fragments of benign gastroesophageal mucosa with moderate        chronic active inflammation and foci of ulcer debris.     -  Goblet cell metaplasia is not demonstrated.     -  Dysplasia is not seen.    (B)  Biopsy of antrum:    -  Fragments of benign gastric antral/fundal mucosa with features       of chemical (reactive) gastropathy.    -  H. pylori organisms are not demonstrated (Giemsa stain).             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     -  Please see Comment.    Note  COMMENT:  (A)  Viral nuclear inclusions and hyphal elements are not demonstrated.    (B)  Clinical correlation is requested with origin of the biopsy from the    incisura region where goblet cell metaplasia is a normal finding.                                                                                                                                                                                                                                                                                                                                                                                                                                                                                         By the signature on this report, the individual or group listed as making the  Final Interpretation/Diagnosis certifies that they have reviewed this case.  PROFESSIONAL CODES:  CPT:  88305 x 2, 72554    ICD10:  K92.2, K92.0, K21.0, K29.60    ALEA SMALLS MD    (Electronically signed by)    Verified: 09/07/16    [facility]             Microscopic Description:  MICROSCOPIC DESCRIPTION:  Microscopic slides examined.    [facility]      Clinical History:  SPECIMEN SOURCE:  (A)  Biopsy of esophagus    (B)  Biopsy of antrum    CLINICAL INFORMATION:  Upper GI bleed and hematemesis      Specimens Submitted As:  A: Esophageal biopsy   B: Antral biopsy       Other Related Clinical Data  PROFESSIONAL CODES:  CPT:  88305 x 2, 41934    ICD10:  K92.2, K92.0, K21.0, K29.60    ALEA SMALLS MD    (Electronically signed by)    Verified: 09/07/16    [facility]  Gross Description:  GROSS DESCRIPTION:  (A)  Received in formalin are four fragments of pink-tan mucosa-covered soft    tissue measuring 0.3 x 0.2 x 0.2 cm. in aggregate. The soft tissue is grossly    unremarkable and entirely submitted for routine microscopic evaluation.    (B)  Received in formalin is a single fragment of pink-tan mucosa-covered soft    tissue measuring 0.2 x 0.2 x 0.2 cm. The soft tissue is grossly unremarkable    and entirely submitted for routine microscopic evaluation.    [facility]  Department of Pathology   [address] [place] [address]             Assessment/Plan   BABAK GROSS is a 42 year old Male with history of polysubstance abuse (snort cocaine, tobacco), recent found locally advanced OPSCC (p16+), started chemo rad on  1/11.     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   - s/p chemoradiation 1/11-2/28/22  - 3/8/23:  labs are stable Na wnl, he still drink and eat ok by mouth, mucositis related pain is tolerable with current meds. Pain med is managed by supp onc, wt loss due to pain.  Pt was over-using both BMX and dex, reinforced patient about appropriate  use of all the meds.  Pain meds are strictly managed by supp onc.  - 6/1/23: PET/CT showed very good response. Near complete interval resolution of hypermetabolic activity  within bilateral cervical lymph node stations. Mild residual josse activity within a left cervical lymph node is indeterminate in nature and residual viable malignancy cannot be excluded. Persistent mouth pain mostly only with eating, some wt loss. Encouraged  pt to continue increase boost intake for extra nutritional support and f/u with dietitian.  I will arrange repeat CT neck with contrast in 3 months to f/u on his residual josse activity.  -6/29/23: swelling in submandibular & bilateral posterior neck tightness/pain, repeat CT neck on 6/18 showed no new lesions, left cervical LN was the same size as seen in 6/1 PET/CT.  Swelling is likely post treatment related, but advised pt to call us  if swelling or neck tightness/pain worsens.   -9/14/23: ongoing wt issue due to mouth pain, fluid reflux from nostril with drinking. Seen by speech, supp onc and dietitian today. CT neck will be rescheduled to f/u on residual josse activity seen in 6/1/23.  -9/23/23: CT neck showed stable right level 2 lymph node  -11/9/23: ongoing dry mouth with rotten/broken teeth, clear discharge from both ear canal with decreased hearing.  He will see ENT next week and also dentist.  -12/7/23: ongoing dry mouth with rotten/broken teeth,  insurance issues related to dental work up, seen by  JUNI today.   -2/8/24: wt stable on daily protein drinks, ongoing mouth pain but less, stable stiff neck. No new symptoms.    # Wt loss: improved  -likely due to pain not well controlled since he cant get methadone at his current facility  -1 Ibs wt gain since last visit    Plan:  -RTC in 1 month with labs (CBC, CMP) with CT n/c  -follow up with Dentist for teeth removal  -Continue f/u with dietitian and supp onc

## 2024-02-09 ENCOUNTER — TELEPHONE (OUTPATIENT)
Dept: PALLIATIVE MEDICINE | Facility: CLINIC | Age: 44
End: 2024-02-09
Payer: MEDICAID

## 2024-02-09 ENCOUNTER — SOCIAL WORK (OUTPATIENT)
Dept: CASE MANAGEMENT | Facility: HOSPITAL | Age: 44
End: 2024-02-09
Payer: MEDICAID

## 2024-02-09 DIAGNOSIS — Z79.891 ENCOUNTER FOR MONITORING OPIOID MAINTENANCE THERAPY: ICD-10-CM

## 2024-02-09 DIAGNOSIS — G89.3 NEOPLASM RELATED PAIN: ICD-10-CM

## 2024-02-09 DIAGNOSIS — Z51.5 ENCOUNTER FOR PALLIATIVE CARE: ICD-10-CM

## 2024-02-09 DIAGNOSIS — Z51.81 ENCOUNTER FOR MONITORING OPIOID MAINTENANCE THERAPY: ICD-10-CM

## 2024-02-09 RX ORDER — METHADONE HYDROCHLORIDE 10 MG/1
20 TABLET ORAL EVERY 8 HOURS
Qty: 180 TABLET | Refills: 0 | Status: SHIPPED | OUTPATIENT
Start: 2024-02-09 | End: 2024-03-11 | Stop reason: SDUPTHER

## 2024-02-09 RX ORDER — PILOCARPINE HYDROCHLORIDE 5 MG/1
5 TABLET, FILM COATED ORAL 3 TIMES DAILY PRN
Qty: 90 TABLET | Refills: 2 | Status: SHIPPED | OUTPATIENT
Start: 2024-02-09 | End: 2024-05-15 | Stop reason: SDUPTHER

## 2024-02-09 RX ORDER — POLYETHYLENE GLYCOL 3350 17 G/17G
17 POWDER, FOR SOLUTION ORAL DAILY PRN
Qty: 510 G | Refills: 3 | Status: SHIPPED | OUTPATIENT
Start: 2024-02-09 | End: 2024-05-15 | Stop reason: SDUPTHER

## 2024-02-09 NOTE — PROGRESS NOTES
LSW spoke with patient during hem/onc visit regarding food assistance and being exempt from work program. LSW provided patient with a diagnosis certification letter to submit to [employer] as proof of exemption from work program.

## 2024-02-13 ENCOUNTER — NUTRITION (OUTPATIENT)
Dept: HEMATOLOGY/ONCOLOGY | Facility: HOSPITAL | Age: 44
End: 2024-02-13
Payer: MEDICAID

## 2024-02-13 ENCOUNTER — SOCIAL WORK (OUTPATIENT)
Dept: CASE MANAGEMENT | Facility: HOSPITAL | Age: 44
End: 2024-02-13
Payer: MEDICAID

## 2024-02-13 NOTE — PROGRESS NOTES
LSW received call from patient informing LSW he was denied social security disability due to it being determined he is able to work. LSW advised patient to reach out to social security to find out what is needed for an appeal. LSW also resent the Diagnosis Certification letter via email as requested to help with his appeal process.

## 2024-02-13 NOTE — PROGRESS NOTES
NUTRITION COMMUNICATION NOTE    Shayne Messer     REASON FOR COMMUNICATION:       Patient called  Was unable to locate number to [employer] to call for a re-order of his ONS  I offered to call Donna for him to additionally inquire about availability of Ensure Plus  Donna was called   No availability for Plus products at this time   Boost VHC was re-ordered  Pt was called and is aware he was also provided with [employer] phone number      Wt Readings from Last 10 Encounters:   02/08/24 74.3 kg (163 lb 11.2 oz)   01/17/24 73.7 kg (162 lb 7.7 oz)   01/10/24 74.7 kg (164 lb 9.6 oz)   12/07/23 72.6 kg (160 lb 0.9 oz)   11/15/23 69.9 kg (154 lb 3.2 oz)   11/09/23 68.3 kg (150 lb 9.2 oz)   10/12/23 70.8 kg (156 lb)   10/04/23 71 kg (156 lb 8.4 oz)   10/01/23 68 kg (150 lb)   09/30/23 70.3 kg (155 lb)

## 2024-02-14 ENCOUNTER — PHARMACY VISIT (OUTPATIENT)
Dept: PHARMACY | Facility: CLINIC | Age: 44
End: 2024-02-14
Payer: MEDICAID

## 2024-02-14 ENCOUNTER — OFFICE VISIT (OUTPATIENT)
Dept: BEHAVIORAL HEALTH | Facility: HOSPITAL | Age: 44
End: 2024-02-14
Payer: MEDICAID

## 2024-02-14 ENCOUNTER — DOCUMENTATION (OUTPATIENT)
Dept: PALLIATIVE MEDICINE | Facility: HOSPITAL | Age: 44
End: 2024-02-14
Payer: MEDICAID

## 2024-02-14 VITALS
BODY MASS INDEX: 23.52 KG/M2 | SYSTOLIC BLOOD PRESSURE: 122 MMHG | RESPIRATION RATE: 18 BRPM | TEMPERATURE: 99 F | DIASTOLIC BLOOD PRESSURE: 69 MMHG | OXYGEN SATURATION: 98 % | HEART RATE: 69 BPM | WEIGHT: 161.16 LBS

## 2024-02-14 DIAGNOSIS — F15.10 METHAMPHETAMINE ABUSE (MULTI): ICD-10-CM

## 2024-02-14 DIAGNOSIS — F14.90 COCAINE USE: ICD-10-CM

## 2024-02-14 DIAGNOSIS — G89.3 NEOPLASM RELATED PAIN: ICD-10-CM

## 2024-02-14 DIAGNOSIS — F31.32 BIPOLAR AFFECTIVE DISORDER, CURRENTLY DEPRESSED, MODERATE (MULTI): ICD-10-CM

## 2024-02-14 DIAGNOSIS — F10.20 ALCOHOL USE DISORDER, SEVERE, DEPENDENCE (MULTI): ICD-10-CM

## 2024-02-14 DIAGNOSIS — F12.90 CANNABIS USE, UNCOMPLICATED: ICD-10-CM

## 2024-02-14 DIAGNOSIS — Z51.5 ENCOUNTER FOR PALLIATIVE CARE: ICD-10-CM

## 2024-02-14 DIAGNOSIS — F31.9 BIPOLAR DEPRESSION (MULTI): ICD-10-CM

## 2024-02-14 DIAGNOSIS — K13.79 MOUTH PAIN: ICD-10-CM

## 2024-02-14 PROCEDURE — 99214 OFFICE O/P EST MOD 30 MIN: CPT | Mod: AM | Performed by: PSYCHIATRY & NEUROLOGY

## 2024-02-14 PROCEDURE — 3074F SYST BP LT 130 MM HG: CPT | Performed by: PSYCHIATRY & NEUROLOGY

## 2024-02-14 PROCEDURE — RXMED WILLOW AMBULATORY MEDICATION CHARGE

## 2024-02-14 PROCEDURE — 3008F BODY MASS INDEX DOCD: CPT | Performed by: PSYCHIATRY & NEUROLOGY

## 2024-02-14 PROCEDURE — 3078F DIAST BP <80 MM HG: CPT | Performed by: PSYCHIATRY & NEUROLOGY

## 2024-02-14 PROCEDURE — 99214 OFFICE O/P EST MOD 30 MIN: CPT | Performed by: PSYCHIATRY & NEUROLOGY

## 2024-02-14 RX ORDER — SERTRALINE HYDROCHLORIDE 100 MG/1
100 TABLET, FILM COATED ORAL DAILY
Qty: 30 TABLET | Refills: 2 | Status: SHIPPED | OUTPATIENT
Start: 2024-02-14 | End: 2024-05-29 | Stop reason: WASHOUT

## 2024-02-14 ASSESSMENT — PAIN SCALES - GENERAL: PAINLEVEL: 4
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
## 2024-02-14 NOTE — PROGRESS NOTES
"Attempted to call patient for scheduled telephone visit today. First number in chart was answered by unknown man who stated \"Shayne no longer lives here\" and proceeded to hand up the phone. The other two numbers listed in patient's chart were attempted but both numbers are not in service/disconnected. Unable to reach patient.  "

## 2024-02-21 ENCOUNTER — TELEPHONE (OUTPATIENT)
Dept: HEMATOLOGY/ONCOLOGY | Facility: CLINIC | Age: 44
End: 2024-02-21
Payer: MEDICAID

## 2024-02-21 NOTE — TELEPHONE ENCOUNTER
Called patient and he confirmed that he was able to get in on 3/4. Patient had no other questions at this time.

## 2024-03-08 ENCOUNTER — HOSPITAL ENCOUNTER (OUTPATIENT)
Dept: RADIOLOGY | Facility: HOSPITAL | Age: 44
Discharge: HOME | End: 2024-03-08
Payer: MEDICAID

## 2024-03-08 DIAGNOSIS — C76.0 HEAD AND NECK CANCER (MULTI): ICD-10-CM

## 2024-03-08 PROCEDURE — 71260 CT THORAX DX C+: CPT | Performed by: RADIOLOGY

## 2024-03-08 PROCEDURE — 71260 CT THORAX DX C+: CPT

## 2024-03-08 PROCEDURE — 70491 CT SOFT TISSUE NECK W/DYE: CPT | Performed by: RADIOLOGY

## 2024-03-08 PROCEDURE — 70491 CT SOFT TISSUE NECK W/DYE: CPT

## 2024-03-08 PROCEDURE — 2550000001 HC RX 255 CONTRASTS: Mod: SE | Performed by: INTERNAL MEDICINE

## 2024-03-08 RX ADMIN — IOHEXOL 110 ML: 350 INJECTION, SOLUTION INTRAVENOUS at 12:35

## 2024-03-11 ENCOUNTER — TELEPHONE (OUTPATIENT)
Dept: PALLIATIVE MEDICINE | Facility: CLINIC | Age: 44
End: 2024-03-11

## 2024-03-11 ENCOUNTER — TELEPHONE (OUTPATIENT)
Dept: PALLIATIVE MEDICINE | Facility: CLINIC | Age: 44
End: 2024-03-11
Payer: MEDICAID

## 2024-03-11 DIAGNOSIS — Z51.81 ENCOUNTER FOR MONITORING OPIOID MAINTENANCE THERAPY: ICD-10-CM

## 2024-03-11 DIAGNOSIS — G89.3 NEOPLASM RELATED PAIN: ICD-10-CM

## 2024-03-11 DIAGNOSIS — Z51.5 ENCOUNTER FOR PALLIATIVE CARE: ICD-10-CM

## 2024-03-11 DIAGNOSIS — Z79.891 ENCOUNTER FOR MONITORING OPIOID MAINTENANCE THERAPY: ICD-10-CM

## 2024-03-11 RX ORDER — METHADONE HYDROCHLORIDE 10 MG/1
20 TABLET ORAL EVERY 8 HOURS
Qty: 180 TABLET | Refills: 0 | Status: SHIPPED | OUTPATIENT
Start: 2024-03-11 | End: 2024-03-14 | Stop reason: SDUPTHER

## 2024-03-11 NOTE — TELEPHONE ENCOUNTER
Refill pended to provider for Methadone 20 mg every 8 hours 180/30.  OARRS reviewed.  Patient due for refill.  Patient will follow up with provider 3/14.

## 2024-03-14 ENCOUNTER — TELEMEDICINE (OUTPATIENT)
Dept: PALLIATIVE MEDICINE | Facility: HOSPITAL | Age: 44
End: 2024-03-14
Payer: MEDICAID

## 2024-03-14 ENCOUNTER — LAB (OUTPATIENT)
Dept: LAB | Facility: HOSPITAL | Age: 44
End: 2024-03-14
Payer: MEDICAID

## 2024-03-14 ENCOUNTER — OFFICE VISIT (OUTPATIENT)
Dept: HEMATOLOGY/ONCOLOGY | Facility: HOSPITAL | Age: 44
End: 2024-03-14
Payer: MEDICAID

## 2024-03-14 ENCOUNTER — APPOINTMENT (OUTPATIENT)
Dept: BEHAVIORAL HEALTH | Facility: HOSPITAL | Age: 44
End: 2024-03-14
Payer: MEDICAID

## 2024-03-14 ENCOUNTER — NUTRITION (OUTPATIENT)
Dept: HEMATOLOGY/ONCOLOGY | Facility: HOSPITAL | Age: 44
End: 2024-03-14

## 2024-03-14 VITALS
TEMPERATURE: 97.2 F | HEART RATE: 66 BPM | WEIGHT: 162.26 LBS | HEIGHT: 69 IN | OXYGEN SATURATION: 100 % | BODY MASS INDEX: 24.03 KG/M2 | SYSTOLIC BLOOD PRESSURE: 133 MMHG | DIASTOLIC BLOOD PRESSURE: 75 MMHG | RESPIRATION RATE: 20 BRPM

## 2024-03-14 DIAGNOSIS — K13.79 MOUTH PAIN: ICD-10-CM

## 2024-03-14 DIAGNOSIS — K59.03 DRUG-INDUCED CONSTIPATION: ICD-10-CM

## 2024-03-14 DIAGNOSIS — F19.90 ILLICIT DRUG USE: ICD-10-CM

## 2024-03-14 DIAGNOSIS — C09.9 MALIGNANT NEOPLASM OF TONSIL (MULTI): ICD-10-CM

## 2024-03-14 DIAGNOSIS — F41.9 ANXIETY: Primary | ICD-10-CM

## 2024-03-14 DIAGNOSIS — F15.10 METHAMPHETAMINE ABUSE (MULTI): ICD-10-CM

## 2024-03-14 DIAGNOSIS — F31.32 BIPOLAR AFFECTIVE DISORDER, CURRENTLY DEPRESSED, MODERATE (MULTI): ICD-10-CM

## 2024-03-14 DIAGNOSIS — F31.9 BIPOLAR DEPRESSION (MULTI): ICD-10-CM

## 2024-03-14 DIAGNOSIS — G89.3 NEOPLASM RELATED PAIN: ICD-10-CM

## 2024-03-14 DIAGNOSIS — C76.0 HEAD AND NECK CANCER (MULTI): Primary | ICD-10-CM

## 2024-03-14 DIAGNOSIS — Z51.81 ENCOUNTER FOR MONITORING OPIOID MAINTENANCE THERAPY: ICD-10-CM

## 2024-03-14 DIAGNOSIS — C76.0 HEAD AND NECK CANCER (MULTI): ICD-10-CM

## 2024-03-14 DIAGNOSIS — Z79.891 ENCOUNTER FOR MONITORING OPIOID MAINTENANCE THERAPY: ICD-10-CM

## 2024-03-14 DIAGNOSIS — Z51.5 ENCOUNTER FOR PALLIATIVE CARE: Primary | ICD-10-CM

## 2024-03-14 DIAGNOSIS — F41.9 ANXIETY AND DEPRESSION: ICD-10-CM

## 2024-03-14 DIAGNOSIS — F32.A ANXIETY AND DEPRESSION: ICD-10-CM

## 2024-03-14 DIAGNOSIS — K11.7 XEROSTOMIA: ICD-10-CM

## 2024-03-14 LAB
ALBUMIN SERPL BCP-MCNC: 4 G/DL (ref 3.4–5)
ALP SERPL-CCNC: 68 U/L (ref 33–120)
ALT SERPL W P-5'-P-CCNC: 10 U/L (ref 10–52)
ANION GAP SERPL CALC-SCNC: 11 MMOL/L (ref 10–20)
AST SERPL W P-5'-P-CCNC: 15 U/L (ref 9–39)
BASOPHILS # BLD AUTO: 0.02 X10*3/UL (ref 0–0.1)
BASOPHILS NFR BLD AUTO: 0.3 %
BILIRUB SERPL-MCNC: 0.5 MG/DL (ref 0–1.2)
BUN SERPL-MCNC: 10 MG/DL (ref 6–23)
CALCIUM SERPL-MCNC: 9.1 MG/DL (ref 8.6–10.3)
CHLORIDE SERPL-SCNC: 101 MMOL/L (ref 98–107)
CO2 SERPL-SCNC: 30 MMOL/L (ref 21–32)
CREAT SERPL-MCNC: 0.85 MG/DL (ref 0.5–1.3)
EGFRCR SERPLBLD CKD-EPI 2021: >90 ML/MIN/1.73M*2
EOSINOPHIL # BLD AUTO: 0.37 X10*3/UL (ref 0–0.7)
EOSINOPHIL NFR BLD AUTO: 6.3 %
ERYTHROCYTE [DISTWIDTH] IN BLOOD BY AUTOMATED COUNT: 12.7 % (ref 11.5–14.5)
GLUCOSE SERPL-MCNC: 126 MG/DL (ref 74–99)
HCT VFR BLD AUTO: 42.8 % (ref 41–52)
HGB BLD-MCNC: 14.3 G/DL (ref 13.5–17.5)
IMM GRANULOCYTES # BLD AUTO: 0.02 X10*3/UL (ref 0–0.7)
IMM GRANULOCYTES NFR BLD AUTO: 0.3 % (ref 0–0.9)
LYMPHOCYTES # BLD AUTO: 0.89 X10*3/UL (ref 1.2–4.8)
LYMPHOCYTES NFR BLD AUTO: 15.1 %
MCH RBC QN AUTO: 31.7 PG (ref 26–34)
MCHC RBC AUTO-ENTMCNC: 33.4 G/DL (ref 32–36)
MCV RBC AUTO: 95 FL (ref 80–100)
MONOCYTES # BLD AUTO: 0.5 X10*3/UL (ref 0.1–1)
MONOCYTES NFR BLD AUTO: 8.5 %
NEUTROPHILS # BLD AUTO: 4.1 X10*3/UL (ref 1.2–7.7)
NEUTROPHILS NFR BLD AUTO: 69.5 %
NRBC BLD-RTO: 0 /100 WBCS (ref 0–0)
PLATELET # BLD AUTO: 200 X10*3/UL (ref 150–450)
POTASSIUM SERPL-SCNC: 3.8 MMOL/L (ref 3.5–5.3)
PROT SERPL-MCNC: 7.1 G/DL (ref 6.4–8.2)
RBC # BLD AUTO: 4.51 X10*6/UL (ref 4.5–5.9)
SODIUM SERPL-SCNC: 138 MMOL/L (ref 136–145)
WBC # BLD AUTO: 5.9 X10*3/UL (ref 4.4–11.3)

## 2024-03-14 PROCEDURE — 3078F DIAST BP <80 MM HG: CPT | Performed by: INTERNAL MEDICINE

## 2024-03-14 PROCEDURE — 3075F SYST BP GE 130 - 139MM HG: CPT | Performed by: INTERNAL MEDICINE

## 2024-03-14 PROCEDURE — 3008F BODY MASS INDEX DOCD: CPT | Performed by: NURSE PRACTITIONER

## 2024-03-14 PROCEDURE — 85025 COMPLETE CBC W/AUTO DIFF WBC: CPT

## 2024-03-14 PROCEDURE — 99215 OFFICE O/P EST HI 40 MIN: CPT | Performed by: INTERNAL MEDICINE

## 2024-03-14 PROCEDURE — 80053 COMPREHEN METABOLIC PANEL: CPT

## 2024-03-14 PROCEDURE — 99214 OFFICE O/P EST MOD 30 MIN: CPT | Performed by: NURSE PRACTITIONER

## 2024-03-14 PROCEDURE — 3008F BODY MASS INDEX DOCD: CPT | Performed by: INTERNAL MEDICINE

## 2024-03-14 PROCEDURE — 36415 COLL VENOUS BLD VENIPUNCTURE: CPT

## 2024-03-14 RX ORDER — METHADONE HYDROCHLORIDE 10 MG/1
20 TABLET ORAL EVERY 8 HOURS
Qty: 180 TABLET | Refills: 0 | Status: SHIPPED | OUTPATIENT
Start: 2024-03-14 | End: 2024-04-09 | Stop reason: SDUPTHER

## 2024-03-14 RX ORDER — DIAZEPAM 10 MG/1
10 TABLET ORAL ONCE
Qty: 1 TABLET | Refills: 0 | Status: SHIPPED | OUTPATIENT
Start: 2024-03-14 | End: 2024-05-29 | Stop reason: WASHOUT

## 2024-03-14 ASSESSMENT — PAIN SCALES - GENERAL: PAINLEVEL: 4

## 2024-03-14 NOTE — PROGRESS NOTES
Patient ID: Shayne Messer is a 43 y.o. male.    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 Kyunghee Burkitt  Rad/onc: Dr. Valdez  ENT: Dr Jerome  Treatment:  - cisplatin 100mg/m2 q3wk with RT     Oncology history   - 11/11/2022: enlarging right tonsillar mass and RT neck mass x 6 months  - 11/22/2022: triple scope, Enlarged and indurated right tonsil with extension of abnormal appearing tissue onto the soft palate. No extension to BOT and no separate lesions or masses identified; biopsy of right tonsillar mass: invasive p16+ SCC       Past Medical History:   Past Medical History:  No date: Bipolar 1 disorder (CMS/HCC)  No date: Immunization not carried out for unspecified reason      Comment:  COVID-19 vaccination not done  No date: Other specified abnormal findings of blood chemistry      Comment:  High serum cholestanol   Surgical History:    No past surgical history on file.   Family History:    No family history on file.  Family Oncology History:    Cancer-related family history is not on file.  Social History:    Social History     Tobacco Use    Smoking status: Former     Types: Cigarettes    Smokeless tobacco: Current    Tobacco comments:     Vaping.   Substance Use Topics    Alcohol use: Never    Drug use: Yes     Types: Marijuana          Subjective   Chief Complaint:     HPI  -He reports  his mouth pain is slightly better, but pain is still there mostly back of mouth, currently on Methadone  -His wt is stable, he takes 4 boost daily, still can't eat solid food.  -He still has sever dry mouth, hard to chew.    -He also has lots of broken teeth.  He sees dentist on 3/29 for teeth extraction.  -Denies dysphagia, SOB, fever, chills.      PmHx:  Depression with suicidal ideation in the past requiring hospital admission.  Nothing that he reports in the last 6 months.    Cocaine abuse (snort), currently sober.  Daily smoker.   Self-reported ADHD.  "      ROS  Review of Systems - Oncology    Allergies  No Known Allergies     Medications  Current Outpatient Medications   Medication Instructions    albuterol 90 mcg/actuation inhaler INHALE 2 PUFFS BY MOUTH EVERY 4 HOURS AS NEEDED. USE WITH SPACER    BMX ORAL SUSPENSION (1:1:1) Swish and swallow 10ml by mouth every 6 hours if needed for mucositis    diazePAM (VALIUM) 10 mg, oral, Once, Please take 1 hour before dental oral surgery visit for extractions.    hydrOXYzine HCL (Atarax) 25 mg tablet     hydrOXYzine HCL (ATARAX) 50 mg, oral, 3 times daily PRN    ibuprofen 400 mg, oral, Every 8 hours    magic mouthwash (lidocaine, diphenhydrAMINE, Maalox 1:1:1) 10 mL, Swish & Swallow, Every 6 hours PRN    methadone (DOLOPHINE) 20 mg, oral, Every 8 hours    naloxone (NARCAN) 4 mg, nasal, As needed, May repeat every 2-3 minutes if needed, alternating nostrils, until medical assistance becomes available.    pilocarpine (SALAGEN) 5 mg, oral, 3 times daily PRN    polyethylene glycol (MIRALAX) 17 g, oral, Daily PRN    sertraline (ZOLOFT) 100 mg, oral, Daily    Stimulant Laxative Plus 8.6-50 mg tablet           Objective   VS: /75   Pulse 66   Temp 36.2 °C (97.2 °F) (Core)   Resp 20   Ht (S) 1.76 m (5' 9.29\")   Wt 73.6 kg (162 lb 4.1 oz)   SpO2 100%   BMI 23.76 kg/m²   Weight: Daily Weight  03/14/24 : 73.6 kg (162 lb 4.1 oz)  02/14/24 : 73.1 kg (161 lb 2.5 oz)  02/08/24 : 74.3 kg (163 lb 11.2 oz)  01/17/24 : 73.7 kg (162 lb 7.7 oz)  01/10/24 : 74.7 kg (164 lb 9.6 oz)  12/07/23 : 72.6 kg (160 lb 0.9 oz)  11/15/23 : 69.9 kg (154 lb 3.2 oz)      Physical Exam  Constitutional:       Appearance: Normal appearance.   HENT:      Head: Normocephalic and atraumatic.      Mouth/Throat:      Mouth: Mucous membranes are moist.      Comments: Multiple rotten and broken bottom teeth.  Eyes:      Extraocular Movements: Extraocular movements intact.      Pupils: Pupils are equal, round, and reactive to light.   Cardiovascular:      " Rate and Rhythm: Normal rate and regular rhythm.   Pulmonary:      Effort: Pulmonary effort is normal.      Breath sounds: Normal breath sounds.   Musculoskeletal:         General: Normal range of motion.      Cervical back: Normal range of motion and neck supple.   Skin:     General: Skin is warm.   Neurological:      General: No focal deficit present.      Mental Status: He is alert and oriented to person, place, and time.         Diagnostic Results   Labs  Below labs are reviewed today.  Results from last 7 days   Lab Units 03/14/24  1242   WBC AUTO x10*3/uL 5.9   HEMOGLOBIN g/dL 14.3   HEMATOCRIT % 42.8   PLATELETS AUTO x10*3/uL 200   NEUTROS ABS x10*3/uL 4.10   LYMPHS ABS AUTO x10*3/uL 0.89*   MONOS ABS AUTO x10*3/uL 0.50   EOS ABS AUTO x10*3/uL 0.37   NEUTROS PCT AUTO % 69.5   LYMPHS PCT AUTO % 15.1   MONOS PCT AUTO % 8.5   EOS PCT AUTO % 6.3                            Images       Pathology  Lab Results   Component Value Date    PATHREP  11/22/2022     Name BABAK GROSSJael                                                                                                   Accession #: [id number]            Pathologist:                   WADAD S. MNEIMNEH, MD  Date of Procedure:    11/22/2022  Date Received:          11/22/2022  Date Reported           11/28/2022  Submitting Physician:   OLGA KOEHLER MD  Location:                    OR  Other External #                                                                    FINAL DIAGNOSIS  A.  RIGHT TONSIL, BIOPSY:    -- INVASIVE P16-POSITIVE SQUAMOUS CELL CARCINOMA, MINIMALLY REPRESENTED (SEE  NOTE).    Note: Minute foci of nonkeratinizing squamous cell carcinoma are noted,  strongly diffusely positive for p16 immunostain.    P16 by immunohistochemistry:  Positive    Reference Range:  Negative:  <50% strong nuclear and cytoplasmic invasive tumor cell staining  Equivocal: 50-70% strong nuclear and cytoplasmic invasive tumor cell staining   Positive: >70% strong  nuclear and cytoplasmic invasive tumor cell staining     Findings received by Dr Murphy on Nov 24, 2022, at 6:50 AM    B.  SUPERIOR RIGHT TONSIL, BIOPSY:  -- SQUAMOUS MUCOSA AND ASSOCIATED LYMPHOID TISSUE WITH FOLLICULAR HYPERPLASIA.   -- NO CARCINOMA IDENTIFIED.    Note: Immunostain for p16 performed on block B1 shows focal, patchy, weak  nonspecific staining in the squamous epithelium.       C.  RIGHT TONSIL, BIOPSY # 1:    -- SQUAMOUS MUCOSA AND ASSOCIATED LYMPHOID TISSUE WITH FOLLICULAR HYPERPLASIA  (SEE NOTE).  -- NO CARCINOMA IDENTIFIED.    Note: Multiple levels were examined.    D.  RIGHT TONSIL BIOPSY # 2:    -- SQUAMOUS MUCOSA WITH FOCAL LYMPHOID TISSUE.  -- NO CARCINOMA IDENTIFIED.                                                                                                                                                                                                                                                                                                                                                                                                                                                                                       Electronically Signed Out By WADAD S. MNEIMNEH, MD/DUNG  By the signature on this report, the individual or group listed as making the  Final Interpretation/Diagnosis certifies that they have reviewed this case.  Diagnostic interpretation performed at [facility] [id number] [facility] [id number]         Intraoperative Consultation:  A: RIGHT TONSIL  BIOPSY    Frozen Section 1:  Date Ordered: 11/22/2022 08:59     Date Received: 11/22/2022 08:59     Date  Called: 11/22/2022 09:38    Intraoperative Diagnosis:  A) Right tonsil biopsy (FSA1): Small detached fragment of atypical cells,  suspicious for carcinoma but not diagnostic; requested additional tissue.    : Dr. Mckenzie Cornejo    Intraoperative Consult Pathologist(s):  VENECIA VALADEZ  "MD MASSIMO (P)    B: SUPERIOR RIGHT TONSIL    Frozen Section 1:  Date Ordered: 11/22/2022 09:46     Date Received: 11/22/2022 09:46     Date  Called: 11/22/2022 10:02    Intraoperative Diagnosis:  B) Superior right tonsil (FSB1): Benign tonsillar tissue.  Intraoperative Consult Pathologist(s):  VENECIA KEYS MD (P)      rx/11/22/2022           Clinical History:  Squamous cell carcinoma    Specimens Submitted As:  A: RIGHT TONSIL  BIOPSY   B: SUPERIOR RIGHT TONSIL   C: RIGHT TONSIL BIOPSY # 1   D: RIGHT TONSIL BIOPSY # 2     Gross Description:  A. Received fresh for intraoperative consultation, labeled with the patient's  name and hospital number and \"right tonsil biopsy\", are multiple tan-red soft  tissue fragments measuring in aggregate 0.8 x 0.8 x 0.2 cm. The specimen is  entirely submitted for intraoperative diagnosis and then for permanent in one  cassette, A1 FSC. RXA/RXH    B. Received fresh for intraoperative consultation, labeled with the patient's  name and hospital number and \"superior right tonsil\", are multiple tan-red soft  tissue fragments measuring in aggregate 0.7 x 0.5 x 0.2 cm. The specimen is  entirely submitted for intraoperative diagnosis and then for permanent in one  cassette, B1 FSC. RXA/RXH    C: Received in formalin, labeled with the patient's name and hospital number  and \"right tonsil biopsy #1\", is a tan soft tissue fragment measuring 0.4 x 0.4  x 0.2 cm. The specimen is submitted in toto in one cassette, C1. SBS    D:  Received in formalin, labeled with the patient's name and hospital number  and \"right tonsil biopsy #2\", is a tan soft tissue fragment measuring 0.9 x 0.7  x 0.5 cm. The specimen is submitted in toto in one cassette, D1. SBS      rx/11/22/2022           The assays/tests were performed with appropriate positive and negative controls  which stained appropriately.    [facility]  Department of Pathology   [address] [place] [address], " [place] [address]        PATHREP  09/02/2016     Name BABAK GROSS                                                                                                   Accession #: [id number]            Pathologist:                     Date of Procedure:    9/2/2016  Date Received:          9/6/2016  Date Reported           9/7/2016  Submitting Physician:     REQUESTING PROVIDER: PRITI العلي  Location:                      Other External #                                                                    FINAL DIAGNOSIS  DIAGNOSIS:  (A)  Biopsy of esophagus:     -  Fragments of benign gastroesophageal mucosa with moderate        chronic active inflammation and foci of ulcer debris.     -  Goblet cell metaplasia is not demonstrated.     -  Dysplasia is not seen.    (B)  Biopsy of antrum:    -  Fragments of benign gastric antral/fundal mucosa with features       of chemical (reactive) gastropathy.    -  H. pylori organisms are not demonstrated (Giemsa stain).             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     -  Please see Comment.    Note  COMMENT:  (A)  Viral nuclear inclusions and hyphal elements are not demonstrated.    (B)  Clinical correlation is requested with origin of the biopsy from the    incisura region where goblet cell metaplasia is a normal finding.                                                                                                                                                                                                                                                                                                                                                                                                                                                                                        By the signature on this report, the individual or group listed as making the  Final Interpretation/Diagnosis certifies that they have reviewed this case.  PROFESSIONAL CODES:  CPT:   88305 x 2, 84040    ICD10:  K92.2, K92.0, K21.0, K29.60    ALEA SMALLS MD    (Electronically signed by)    Verified: 09/07/16    [facility]             Microscopic Description:  MICROSCOPIC DESCRIPTION:  Microscopic slides examined.    [facility]      Clinical History:  SPECIMEN SOURCE:  (A)  Biopsy of esophagus    (B)  Biopsy of antrum    CLINICAL INFORMATION:  Upper GI bleed and hematemesis      Specimens Submitted As:  A: Esophageal biopsy   B: Antral biopsy       Other Related Clinical Data  PROFESSIONAL CODES:  CPT:  88305 x 2, 98601    ICD10:  K92.2, K92.0, K21.0, K29.60    ALEA SMALLS MD    (Electronically signed by)    Verified: 09/07/16    [facility]  Gross Description:  GROSS DESCRIPTION:  (A)  Received in formalin are four fragments of pink-tan mucosa-covered soft    tissue measuring 0.3 x 0.2 x 0.2 cm. in aggregate. The soft tissue is grossly    unremarkable and entirely submitted for routine microscopic evaluation.    (B)  Received in formalin is a single fragment of pink-tan mucosa-covered soft    tissue measuring 0.2 x 0.2 x 0.2 cm. The soft tissue is grossly unremarkable    and entirely submitted for routine microscopic evaluation.    [facility]  Department of Pathology   [address] [place] [id number]             Assessment/Plan   RENATO BABAK MCGILL is a 42 year old Male with history of polysubstance abuse (snort cocaine, tobacco), recent found locally advanced OPSCC (p16+), started chemo rad on  1/11.     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   - s/p chemoradiation 1/11-2/28/22  - 3/8/23:  labs are  stable Na wnl, he still drink and eat ok by mouth, mucositis related pain is tolerable with current meds. Pain med is managed by supp onc, wt loss due to pain.  Pt was over-using both BMX and dex, reinforced patient about appropriate  use of all the meds.  Pain meds are strictly managed by supp onc.  - 6/1/23: PET/CT showed very good response. Near complete interval resolution of hypermetabolic activity  within bilateral cervical lymph node stations. Mild residual josse activity within a left cervical lymph node is indeterminate in nature and residual viable malignancy cannot be excluded. Persistent mouth pain mostly only with eating, some wt loss. Encouraged  pt to continue increase boost intake for extra nutritional support and f/u with dietitian.  I will arrange repeat CT neck with contrast in 3 months to f/u on his residual josse activity.  -6/29/23: swelling in submandibular & bilateral posterior neck tightness/pain, repeat CT neck on 6/18 showed no new lesions, left cervical LN was the same size as seen in 6/1 PET/CT.  Swelling is likely post treatment related, but advised pt to call us  if swelling or neck tightness/pain worsens.   -9/14/23: ongoing wt issue due to mouth pain, fluid reflux from nostril with drinking. Seen by speech, supp onc and dietitian today. CT neck will be rescheduled to f/u on residual josse activity seen in 6/1/23.  -9/23/23: CT neck showed stable right level 2 lymph node  -11/9/23: ongoing dry mouth with rotten/broken teeth, clear discharge from both ear canal with decreased hearing.  He will see ENT next week and also dentist.  -12/7/23: ongoing dry mouth with rotten/broken teeth, insurance issues related to dental work up, seen by  SW today.   -3/14/24: restaging scan CT n/c from 3/8 showed IRENE.  Wt stable on daily protein drinks, ongoing mouth pain but less, stable stiff neck. No new symptoms.  Sees dentist on 3/29 for teeth extraction      Plan:  -follow up with Dentist for teeth  removal  -Continue f/u with dietitian and supp onc

## 2024-03-14 NOTE — PROGRESS NOTES
"NUTRITION Assessment NOTE    Nutrition Assessment     Reason for Visit:  Shayne Messer is a 43 y.o. male who presents for Nutrition Counseling    Subjective: Pt seen today in [facility] clinic for. He reports continued difficulty chewing and painful swallowing. Also has persistent dry mouth, minimal saliva. He is taking 4x/ Boost [facility] daily. Is able to purchase some protein powder and fruits that he makes into smoothie. Does this once per day, as resources are limited.        11/15/2023     1:07 PM 12/7/2023    12:37 PM 1/10/2024     1:44 PM 1/17/2024    11:57 AM 2/8/2024    12:47 PM 2/14/2024    12:05 PM 3/14/2024    12:50 PM   Vitals   Systolic  98  116 115 122 133   Diastolic  55  72 67 69 75   Heart Rate  72  87 64 69 66   Temp 36.3 °C (97.3 °F) 36.1 °C (97 °F) 36.1 °C (97 °F) 36 °C (96.8 °F) 36.1 °C (97 °F) 37.2 °C (99 °F) 36.2 °C (97.2 °F)   Resp  18  18 18 18 20   Height (in) 1.761 m (5' 9.33\")  1.753 m (5' 9\")  1.763 m (5' 9.41\")  1.76 m (5' 9.29\")    Weight (lb) 154.2 160.05 164.6 162.48 163.7 161.16 162.26   BMI 22.56 kg/m2 23.41 kg/m2 24.31 kg/m2 23.99 kg/m2 23.89 kg/m2 23.52 kg/m2 23.76 kg/m2   BSA (m2) 1.85 m2 1.88 m2 1.91 m2 1.89 m2 1.91 m2 1.89 m2 1.9 m2   Visit Report Report Report    Report Report  Report Report Report       Significant value    Multiple values from one day are sorted in reverse-chronological order       Wt Readings from Last 10 Encounters:   03/14/24 73.6 kg (162 lb 4.1 oz)   02/14/24 73.1 kg (161 lb 2.5 oz)   02/08/24 74.3 kg (163 lb 11.2 oz)   01/17/24 73.7 kg (162 lb 7.7 oz)   01/10/24 74.7 kg (164 lb 9.6 oz)   12/07/23 72.6 kg (160 lb 0.9 oz)   11/15/23 69.9 kg (154 lb 3.2 oz)   11/09/23 68.3 kg (150 lb 9.2 oz)   10/12/23 70.8 kg (156 lb)   10/04/23 71 kg (156 lb 8.4 oz)     Weight stable. Also noting desired weight gain since 2023    Lab Results   Component Value Date/Time    GLUCOSE 126 (H) 03/14/2024 1243     03/14/2024 1243    K 3.8 03/14/2024 1243     03/14/2024 " "1243    CO2 30 03/14/2024 1243    ANIONGAP 11 03/14/2024 1243    BUN 10 03/14/2024 1243    CREATININE 0.85 03/14/2024 1243    EGFR >90 03/14/2024 1243    CALCIUM 9.1 03/14/2024 1243    ALBUMIN 4.0 03/14/2024 1243    ALKPHOS 68 03/14/2024 1243    PROT 7.1 03/14/2024 1243    AST 15 03/14/2024 1243    BILITOT 0.5 03/14/2024 1243    ALT 10 03/14/2024 1243     No results found for: \"VITD25\"     Food And Nutrient Intake:  Current Intake: >75%  of estimated energy needs  Pt taking 4x Boost VHC/day    Nutrition Focused Physical Exam Findings:    Subcutaneous Fat Loss  Orbital Fat Pads: Well nourshed (slightly bulging fat pads)  Buccal Fat Pads: Well nourished (full, rounded cheeks)  Triceps: Well nourished (ample fat tissue)  Ribs: Well nourished (full chest, ribs do not protrude)    Muscle Wasting  Temporalis: Well nourished (well-defined muscle)  Pectoralis (Clavicular Region): Well nourished (clavicle not visible)  Deltoid/Trapezius: Well nourished (rounded appearance at arm, shoulder, neck)  Quadriceps: Well nourished (well developed, well rounded)  Gastrocnemius: Well nourished (well developed bulbous muscle)    Edema  Edema: none    ESTIMATED ENERGY NEEDS  Dosing weight: 73.6 kg  Calories per day: 1245-3544  determined by 25-30 kcal/kg  Protein (g) per day:  determined by 1.0-1.5 g/kg  Estimated fluid needs: 7050-4903 determined by 1 kcal/mL    NUTRITION DIAGNOSIS    Nutrition Diagnosis  Patient has Nutrition Diagnosis: Yes  Diagnosis Status (1): New  Nutrition Diagnosis 1: Inadequate oral intake  Related to (1): H/N cancer  As Evidenced by (1): difficulty chewing and swallowing    No malnutrition diagnosis at this time.    NUTRITION INTERVENTION  -Boost VHC 4x/day (DME is [employer])  -Smoothies - continue with fruits and protein powder  -Encourage adequate fluid intake + electrolytes    MONITOR AND EVALUATION  Monitor and Evaluation: Oral intake and Labs         Readiness to Change : Excellent  Level of " Understanding : Excellent  Anticipated Compliant : Excellent

## 2024-03-14 NOTE — PROGRESS NOTES
SUPPORTIVE AND PALLIATIVE ONCOLOGY OUTPATIENT FOLLOW-UP    Virtual or Telephone Consent    An interactive audio and video telecommunication system which permits real time communications between the patient (at the originating site) and provider (at the distant site) was utilized to provide this telehealth service.   Verbal consent was requested and obtained from Shayne Messer on this date, 03/14/24 for a telehealth visit.     SERVICE DATE: 3/14/2024    Subjective   HISTORY OF PRESENT ILLNESS: Shayne Messer is a 43 y.o. male who presents with depression with suicidal attempts, bipolar disorder, tobacco abuse, illicit drug abuse, and diagnosis of Tonsillar Squamous Cell Carcinoma. Patient is following with Dr. Burkitt as primary oncologist. Patient has been referred to Supportive Oncology/Palliative Care for neoplasm related pain and further symptom management.        Pain Assessment:  Pain Score:  4  Location:  mouth, teeth  Description:      Symptom Assessment:  Pain:a little - notes overall pain in mouth is well controlled with Methadone. If he is late on taking his Methadone he does feel pain start to slightly increase. Overall well controlled  Numbness or Tingling in hands/feet/other: none  Sore Muscles/Spasms: none  Headache: none  Dizziness:none  Constipation: none  Diarrhea: none  Nausea: none  Vomiting: none  Lack of Appetite: a little - notes to feel hungry but with inability to eat due to broken teeth and severe dry mouth. 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 He is keeping up on Boost 4x a day and weight has been stable  Weight Loss: none  Taste changes: none  Dry Mouth: very much - notes this to be severe. States pharmacy will not give him Biotene because it is an OTC medication but states he cannot afford it. Is taking Pilocarpine TID but unsure if this is helping at all or not  Pain in Mouth/Swallowing: a little - as above  Lack of Energy: none  Difficulty Sleeping:  none  Worrying: none  Anxiety: none  Depression: none  Shortness of breath: none  Other: none      Information obtained from: chart review and interview of patient  ______________________________________________________________________        Objective   Lab on 03/14/2024   Component Date Value Ref Range Status    WBC 03/14/2024 5.9  4.4 - 11.3 x10*3/uL Final    nRBC 03/14/2024 0.0  0.0 - 0.0 /100 WBCs Final    RBC 03/14/2024 4.51  4.50 - 5.90 x10*6/uL Final    Hemoglobin 03/14/2024 14.3  13.5 - 17.5 g/dL Final    Hematocrit 03/14/2024 42.8  41.0 - 52.0 % Final    MCV 03/14/2024 95  80 - 100 fL Final    MCH 03/14/2024 31.7  26.0 - 34.0 pg Final    MCHC 03/14/2024 33.4  32.0 - 36.0 g/dL Final    RDW 03/14/2024 12.7  11.5 - 14.5 % Final    Platelets 03/14/2024 200  150 - 450 x10*3/uL Final    Neutrophils % 03/14/2024 69.5  40.0 - 80.0 % Final    Immature Granulocytes %, Automated 03/14/2024 0.3  0.0 - 0.9 % Final    Lymphocytes % 03/14/2024 15.1  13.0 - 44.0 % Final    Monocytes % 03/14/2024 8.5  2.0 - 10.0 % Final    Eosinophils % 03/14/2024 6.3  0.0 - 6.0 % Final    Basophils % 03/14/2024 0.3  0.0 - 2.0 % Final    Neutrophils Absolute 03/14/2024 4.10  1.20 - 7.70 x10*3/uL Final    Immature Granulocytes Absolute, Au* 03/14/2024 0.02  0.00 - 0.70 x10*3/uL Final    Lymphocytes Absolute 03/14/2024 0.89 (L)  1.20 - 4.80 x10*3/uL Final    Monocytes Absolute 03/14/2024 0.50  0.10 - 1.00 x10*3/uL Final    Eosinophils Absolute 03/14/2024 0.37  0.00 - 0.70 x10*3/uL Final    Basophils Absolute 03/14/2024 0.02  0.00 - 0.10 x10*3/uL Final    Glucose 03/14/2024 126 (H)  74 - 99 mg/dL Final    Sodium 03/14/2024 138  136 - 145 mmol/L Final    Potassium 03/14/2024 3.8  3.5 - 5.3 mmol/L Final    Chloride 03/14/2024 101  98 - 107 mmol/L Final    Bicarbonate 03/14/2024 30  21 - 32 mmol/L Final    Anion Gap 03/14/2024 11  10 - 20 mmol/L Final    Urea Nitrogen 03/14/2024 10  6 - 23 mg/dL Final    Creatinine 03/14/2024 0.85  0.50 - 1.30  "mg/dL Final    eGFR 03/14/2024 >90  >60 mL/min/1.73m*2 Final    Calcium 03/14/2024 9.1  8.6 - 10.3 mg/dL Final    Albumin 03/14/2024 4.0  3.4 - 5.0 g/dL Final    Alkaline Phosphatase 03/14/2024 68  33 - 120 U/L Final    Total Protein 03/14/2024 7.1  6.4 - 8.2 g/dL Final    AST 03/14/2024 15  9 - 39 U/L Final    Bilirubin, Total 03/14/2024 0.5  0.0 - 1.2 mg/dL Final    ALT 03/14/2024 10  10 - 52 U/L Final     PHYSICAL EXAMINATION   Vital Signs:       11/15/2023     1:07 PM 12/7/2023    12:37 PM 1/10/2024     1:44 PM 1/17/2024    11:57 AM 2/8/2024    12:47 PM 2/14/2024    12:05 PM 3/14/2024    12:50 PM   Vitals   Systolic  98  116 115 122 133   Diastolic  55  72 67 69 75   Heart Rate  72  87 64 69 66   Temp 36.3 °C (97.3 °F) 36.1 °C (97 °F) 36.1 °C (97 °F) 36 °C (96.8 °F) 36.1 °C (97 °F) 37.2 °C (99 °F) 36.2 °C (97.2 °F)   Resp  18  18 18 18 20   Height (in) 1.761 m (5' 9.33\")  1.753 m (5' 9\")  1.763 m (5' 9.41\")  1.76 m (5' 9.29\")    Weight (lb) 154.2 160.05 164.6 162.48 163.7 161.16 162.26   BMI 22.56 kg/m2 23.41 kg/m2 24.31 kg/m2 23.99 kg/m2 23.89 kg/m2 23.52 kg/m2 23.76 kg/m2   BSA (m2) 1.85 m2 1.88 m2 1.91 m2 1.89 m2 1.91 m2 1.89 m2 1.9 m2   Visit Report Report Report    Report Report  Report Report Report       Significant value    Multiple values from one day are sorted in reverse-chronological order      Physical Exam  Not complete dt virtual visit    ASSESSMENT/PLAN    Pain  Pain is: chronic after cancer treatment  Type: somatic and neuropathic  Pain control: sub-optimally controlled  Home regimen:   - Continue Acetaminophen 1000mg [id number] PRN  - Continue Methadone 20mg [id number]   - EKG: (6/15/23) QTc 455. (12/7/23) Qtc 447. - Next Visit   - Continue BMX 10mg QID PRN - given printed script (12/7/23) to [facility] to [facility]  - Viscous Lidocaine 2% - declines - caused patient to vomit due to taste  - Morphine - patient went to detox and taken off medication  - 4/20/23: Patient admitted to doing Meth, no further " opioids at this time  - Referral to [facility] (9/14/2023) - never scheduled  - Going to [employer] for dental work - planning for full teeth extraction      Opioid Use  Medication Management:   - OARRS report reviewed with no aberrant behavior; consistent with  prescriptions/records and patient history  - .  Overdose Risk Score 750. This has been discussed with patient.   - We will continue to closely monitor the patient for signs of prescription misuse including UDS, OARRS review and subjective reports at each visit.  - No concurrent benzodiazepine use   - I am a provider who either is or has consulted and collaborated with a provider certified in Hospice and Palliative Medicine and have conducted a face-face visit and examination for this patient.  - Routine Urine Drug Screen: 1/11/23 appropriately negative for illicit substances.  4/20/23, 6/29/23, 11/9/23 appropriately + for prescribed medications and  - for illicits.  - Controlled Substance Agreement: 1/11/23  - Specifically discussed that controlled substance prescriptions will only be provided by our group as outlined in the completed agreement  - Prescribed naloxone: declined 1/11/23  - Red Flags: hx of cocaine abuse; hx of suicidal attempts     Constipation  At risk for constipation related to opioids.  Usual bowel pattern: daily  Home regimen:   - Continue Senna 2 tabs 1-2x per day PRN  - Continue Miralax 17grams 1-2x per day PRN     Decreased Appetite  Related to  pain/treatment hx  Nutrition following - Lora Sanderson RDN  Home regimen:    - Continue to monitor  - Continue protein supplements     Xerostomia  - Continue Biotene Rinses 15mL QID PRN  - Continue Pilocarpine 5mg TID PRN     Supportive and Palliative Oncology Encounter:  Emotional support provided  Coordination of care  Will continue to follow and address symptoms as needed     Advance Directives  Code Status: Full code    Next Follow-Up Visit:  Return to  clinic in 1 month in person    Signature and billing  Medical complexity was moderate level due to due to complexity of problems, extensive data review, and high risk of management/treatment.  Time was spent on the following: Prep Time, Time Directly with Patient/Family/Caregiver, Documentation Time. Total time spent: 30      Data  Diagnostic tests and information reviewed for today's visit:  Most recent labs and imaging results, Medications         Some elements copied from Palliative Care note on 12/7/23, the elements have been updated and all reflect current decision making from today, 3/14/2024.      Plan of Care discussed with: Patient    SIGNATURE: SINDY Yanes-CNP    Contact information:  Supportive and Palliative Oncology  Monday-Friday 8 AM-5 PM  Phone:  [phone], press option #5, then option #1.   Or Epic Secure Chat

## 2024-03-26 NOTE — PROGRESS NOTES
Cancer Follow up:  TSH: Due now    No chief complaint on file.    HPI:  Shayne Messer is a 43 year old male following up with me today for his tonsil cancer. Last seen 1/2024. He completed chemoradiation therapy 2/28/23. He has been following with Supportive oncology as well as Dr. Burkitt. He continues to struggle.  +dry mouth (severe).  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  He has been sick for the last several months and notes congestion, decreased taste,  +yellow and bloody discharge from both ears with decreased hearing.  He has bilateral PE tubes in place. His most recent CT of neck and chest are negative for disease except for the chest being consistent with mucoid impaction or aspiration      History:  Dx: T2N2M0 Right tonsil cancer, P16+  11/11/22: [facility] [place] with newly discovered right tonsil mass - discharged home  11/22/22: S/p triple endoscopy with biopsy by Dr. Murphy. Found to have right tonsil mass involving the soft palate. Exposure was difficult and it was hard to get a biopsy. Biopsy did return +SCCa, P16+.  12/22: First seen by me  1/11/23: Started chemoradiation therapy  2/28/23: Completed chemoradiation therapy  5/2/23: +in custodial, seen in ED for possible drug   5/11/23: psychiatric eval  5/17/23: PE tubes placed  6/23: PET: Complete response to tx  8/23: MBS +dysphagia recommend therapy  9/23: CT neck negative for recurrent masses, level 2 LN stable    ROS:  Review of Systems     PE:  ENT Physical Exam  Constitutional  Appearance: patient appears well-developed, grooming disheveled;  Communication/Voice: communication appropriate for developmental age; voice quality is hoarse and rough;  Head and Face  Appearance: head appears normal and face appears normal;  Salivary: glands normal;  Head and Face comments: Swelling of right body of mandible  Ear  Auricles: right auricle normal; left auricle normal;  Ear Canals: right ear canal normal; left ear canal  normal;  Tympanic Membranes: bilateral tympanic membranes tympanostomy tubes noted;  Ear comments: Bilateral tubes appear patent however yellowish discharge sitting around tympanic membrane but no purulence  Nose  Internal Nose: nasal mucosa normal; septum normal;  Oral Cavity/Oropharynx  Teeth: tooth decay (+broken teeth, no purulence or active odontogenic infection) noted;  Gums: gingiva normal;  Tongue: normal;  Oral mucosa: mucous membranes dry (moderate to severe);  OC/OP comments: Oropharynx is soft/no visible or palpable masses or lesions  Neck  Neck: radiation changes present;  Respiratory  Inspection: breathing unlabored;  Cardiovascular  Inspection: extremities are warm and well perfused;  Neurovestibular  Mental Status: alert and oriented;  Psychiatric: mood depressed; affect is flat;       Procedures   PROCEDURE NOTE:  Recommended flexible nasopharyngoscopy & laryngoscopy.  Risks, benefits, personnel and alternatives were explained.  The patient wished to proceed.  S/he was re-identified.  PROCEDURE:  Flexible nasopharyngoscopy and laryngoscopy  PREOPERATIVE DIAGNOSIS: oropharyngeal cancer  POSTOPERATIVE DIAGNOSIS: Same as above, no recurrent lesions  INDICATIONS: Inability to tolerate mirror exam  ANESTHESIA: 4% lidocaine and 0.5% phenylephrine  PROCEDURE:  With the patient sitting upright topical anesthesia and vasoconstriction was applied with spray to the right side(s) of the nose.  After waiting an appropriate period of time for anesthesia/vasoconstriction to become effective, a flexible laryngoscope was passed through the right side(s) of the nose.  Nasopharynx, oropharynx, hypopharynx and larynx were examined.  FINDINGS:  The nasopharynx and oropharynx were normal without any lesions or masses visualized.  No masses or lesions were visualized at the base of tongue, vallecula, epiglottis, aryepiglottic folds, pyriform sinuses, and lateral pharyngeal walls.  Mild radiation changes.  True vocal fold  movement was normal.  The patient's airway was widely patent with no evidence of obstruction.  Patient tolerated the procedure well, and there were no complications.     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 Anette Jerome MD.     All medical record entries made by the Scribe were at my direction and personally dictated by me, Dr. Anette Jerome. I have reviewed the chart and agree that the record accurately reflects my personal performance of the history, physical exam, discussion and plan.

## 2024-04-05 ENCOUNTER — TELEPHONE (OUTPATIENT)
Dept: PALLIATIVE MEDICINE | Facility: CLINIC | Age: 44
End: 2024-04-05
Payer: MEDICAID

## 2024-04-05 NOTE — TELEPHONE ENCOUNTER
I spoke with Shayne and he is asking for pain medication as he had a dental procedure and was only given ibuprofen by the dental team. I spoke with SINDY Small and she cannot provide pain medication for dental work. Patient needs to reach out to dental office. Patient reports he will take more of his methadone. I encouraged him not to do that as the methadone is not for dental pain and we cannot provide an early refill if he runs out early.

## 2024-04-08 ENCOUNTER — TELEPHONE (OUTPATIENT)
Dept: HEMATOLOGY/ONCOLOGY | Facility: HOSPITAL | Age: 44
End: 2024-04-08
Payer: MEDICAID

## 2024-04-08 DIAGNOSIS — Z79.891 ENCOUNTER FOR MONITORING OPIOID MAINTENANCE THERAPY: ICD-10-CM

## 2024-04-08 DIAGNOSIS — Z51.5 ENCOUNTER FOR PALLIATIVE CARE: ICD-10-CM

## 2024-04-08 DIAGNOSIS — Z51.81 ENCOUNTER FOR MONITORING OPIOID MAINTENANCE THERAPY: ICD-10-CM

## 2024-04-08 DIAGNOSIS — G89.3 NEOPLASM RELATED PAIN: ICD-10-CM

## 2024-04-08 NOTE — TELEPHONE ENCOUNTER
Patient calling for methadone refill. States will pickup tomorrow. Advised to return call in AM for refill request.

## 2024-04-09 RX ORDER — METHADONE HYDROCHLORIDE 10 MG/1
20 TABLET ORAL EVERY 8 HOURS
Qty: 180 TABLET | Refills: 0 | Status: SHIPPED | OUTPATIENT
Start: 2024-04-09 | End: 2024-05-09 | Stop reason: SDUPTHER

## 2024-04-09 NOTE — TELEPHONE ENCOUNTER
Refill submitted previously with fill date of 4/9/24 to [facility].

## 2024-04-09 NOTE — TELEPHONE ENCOUNTER
[facility] states they will not fill - said refill is now  since it was sent in 3/14. We will send a new refill.

## 2024-04-10 ENCOUNTER — APPOINTMENT (OUTPATIENT)
Dept: PALLIATIVE MEDICINE | Facility: HOSPITAL | Age: 44
End: 2024-04-10
Payer: MEDICAID

## 2024-04-10 ENCOUNTER — TELEPHONE (OUTPATIENT)
Dept: HEMATOLOGY/ONCOLOGY | Facility: HOSPITAL | Age: 44
End: 2024-04-10
Payer: MEDICAID

## 2024-04-10 ENCOUNTER — APPOINTMENT (OUTPATIENT)
Dept: OTOLARYNGOLOGY | Facility: HOSPITAL | Age: 44
End: 2024-04-10
Payer: MEDICAID

## 2024-04-10 NOTE — TELEPHONE ENCOUNTER
Shayne called about oral nutrition supplement order from [employer]. [employer] now has other product available. Will change order to Ensure plus at same calories per day.

## 2024-04-12 NOTE — PROGRESS NOTES
SUPPORTIVE AND PALLIATIVE ONCOLOGY OUTPATIENT FOLLOW-UP      SERVICE DATE: 4/17/2024    Subjective   HISTORY OF PRESENT ILLNESS: Shayne Messer is a 43 y.o. male who presents with depression with suicidal attempts, bipolar disorder, tobacco abuse, illicit drug abuse, and diagnosis of Tonsillar Squamous Cell Carcinoma. Patient is following with Dr. Burkitt as primary oncologist. Patient has been referred to Supportive Oncology/Palliative Care for neoplasm related pain and further symptom management.      Pain Assessment:  Pain Score:    Location:    Description:      Symptom Assessment:  Pain:{ :83853}  Numbness or Tingling in hands/feet/other: { :43299}  Sore Muscles/Spasms: { :25971}  Headache: {  :60592}  Dizziness:{ :45205}  Constipation: { :49709}  Diarrhea: { :93902}  Nausea: { :87632}  Vomiting: { :76044}  Lack of Appetite: {  :00507}   Weight Loss: { :05274}  Taste changes: { :81648}  Dry Mouth: { :09381}  Pain in Mouth/Swallowing: { :05538}  Lack of Energy: { :59078}  Difficulty Sleeping: { :62651}  Worrying: {  :51781}  Anxiety: { :40880}  Depression: { :99808}  Shortness of breath: { :42077}  Other: { :92126}      Information obtained from: { :90426}  ______________________________________________________________________        Objective       {If you would like to pull in Lab results for the last 24 hours, type .[id number] :99}  {If you would like to pull in Imaging results, type .imgrslt :99}       PHYSICAL EXAMINATION   Vital Signs:   Vital signs reviewed  There were no vitals filed for this visit.  Pain Score:        Physical Exam    ASSESSMENT/PLAN    Pain  Pain is: chronic after cancer treatment  Type: somatic and neuropathic  Pain control: sub-optimally controlled  Home regimen:   - Continue Acetaminophen 1000mg [id number] PRN  - Continue Methadone 20mg [id number]   - EKG: (6/15/23) QTc 455. (12/7/23) Qtc 447. - Next Visit   - Continue BMX 10mg QID PRN - given printed script (12/7/23) to take to  [facility]  - Viscous Lidocaine 2% - declines - caused patient to vomit due to taste  - Morphine - patient went to detox and taken off medication  - 4/20/23: Patient admitted to doing Meth, no further opioids at this time  - Referral to [facility] (9/14/2023) - never scheduled  - Going to [employer] for dental work - planning for full teeth extraction      Opioid Use  Medication Management:   - OARRS report reviewed with no aberrant behavior; consistent with  prescriptions/records and patient history  - .  Overdose Risk Score 750. This has been discussed with patient.   - We will continue to closely monitor the patient for signs of prescription misuse including UDS, OARRS review and subjective reports at each visit.  - No concurrent benzodiazepine use   - I am a provider who either is or has consulted and collaborated with a provider certified in Hospice and Palliative Medicine and have conducted a face-face visit and examination for this patient.  - Routine Urine Drug Screen: 1/11/23 appropriately negative for illicit substances.  4/20/23, 6/29/23, 11/9/23 appropriately + for prescribed medications and  - for illicits.  - Controlled Substance Agreement: 1/11/23  - Specifically discussed that controlled substance prescriptions will only be provided by our group as outlined in the completed agreement  - Prescribed naloxone: declined 1/11/23  - Red Flags: hx of cocaine abuse; hx of suicidal attempts     Constipation  At risk for constipation related to opioids.  Usual bowel pattern: daily  Home regimen:   - Continue Senna 2 tabs 1-2x per day PRN  - Continue Miralax 17grams 1-2x per day PRN     Decreased Appetite  Related to  pain/treatment hx  Nutrition following - Lora Sanderson RDN  Home regimen:    - Continue to monitor  - Continue protein supplements     Xerostomia  - Continue Biotene Rinses 15mL QID PRN  - Continue Pilocarpine 5mg TID PRN     Supportive and Palliative Oncology  Encounter:  Emotional support provided  Coordination of care  Will continue to follow and address symptoms as needed    Next Follow-Up Visit:  Return to clinic in ***    Signature and billing  Medical complexity was { :91647} level due to due to complexity of problems, extensive data review, and high risk of management/treatment.  Time was spent on the following: { :07851}. Total time spent: ***      Data  Diagnostic tests and information reviewed for today's visit:  Most recent labs and imaging results, Medications         Some elements copied from Palliative Care note on 3/14/2024, the elements have been updated and all reflect current decision making from today, 4/17/2024.      Plan of Care discussed with: { :72466}    SIGNATURE: SINDY Yanes-CNP    Contact information:  Supportive and Palliative Oncology  Monday-Friday 8 AM-5 PM  Phone:  [phone], press option #5, then option #1.   Or Epic Secure Chat

## 2024-04-15 ENCOUNTER — TELEPHONE (OUTPATIENT)
Dept: ADMISSION | Facility: HOSPITAL | Age: 44
End: 2024-04-15
Payer: MEDICAID

## 2024-04-15 DIAGNOSIS — Z72.0 NICOTINE ABUSE: ICD-10-CM

## 2024-04-15 RX ORDER — ASPIRIN/CALCIUM CARB/MAGNESIUM 325 MG
TABLET ORAL
Qty: 180 LOZENGE | Refills: 1 | Status: SHIPPED | OUTPATIENT
Start: 2024-04-15

## 2024-04-15 NOTE — TELEPHONE ENCOUNTER
Pt requesting script for   Nicotine lozenges. - 4mg. Not on medication list .   Pt states he typically vapes, but he ran out of vaping product so he would like to go back to nicotine lozenges.   Pharmacy [facility] on [address]  Last FUV 3/14 with next planned FUV 7/31.

## 2024-04-17 ENCOUNTER — APPOINTMENT (OUTPATIENT)
Dept: PALLIATIVE MEDICINE | Facility: HOSPITAL | Age: 44
End: 2024-04-17
Payer: MEDICAID

## 2024-04-22 ENCOUNTER — TELEPHONE (OUTPATIENT)
Dept: BEHAVIORAL HEALTH | Facility: HOSPITAL | Age: 44
End: 2024-04-22

## 2024-04-23 ENCOUNTER — SOCIAL WORK (OUTPATIENT)
Dept: CASE MANAGEMENT | Facility: HOSPITAL | Age: 44
End: 2024-04-23

## 2024-04-23 NOTE — PROGRESS NOTES
LSW received call from patient requesting assistance with transportation to 5/8/2024 appointment. LSW submitted a Roundtrip request for patient. Patient also requested an emotional support animal certification letter. RAMANAW informed patient the [facility] Hem/ Onc team could not provide this letter due to not having a therapeutic relationship with the patient. LSW referred patient to his behavioral health provider for assistance.

## 2024-04-25 ENCOUNTER — APPOINTMENT (OUTPATIENT)
Dept: PALLIATIVE MEDICINE | Facility: HOSPITAL | Age: 44
End: 2024-04-25
Payer: MEDICAID

## 2024-05-02 NOTE — PROGRESS NOTES
SUPPORTIVE AND PALLIATIVE ONCOLOGY OUTPATIENT FOLLOW-UP      SERVICE DATE: 5/8/2024    Subjective   HISTORY OF PRESENT ILLNESS: Shayne Messer is a 43 y.o. male who presents with suicidal attempts, bipolar disorder, tobacco abuse, illicit drug abuse, and diagnosis of Tonsillar Squamous Cell Carcinoma. Patient is following with Dr. Burkitt as primary oncologist. Patient has been referred to Supportive Oncology/Palliative Care for neoplasm related pain and further symptom management.     Pain Assessment:  Pain Score:    Location:    Description:      Symptom Assessment:  Pain:{ :04563}  Numbness or Tingling in hands/feet/other: { :23626}  Sore Muscles/Spasms: { :60986}  Headache: {  :59486}  Dizziness:{ :79730}  Constipation: { :67464}  Diarrhea: { :38116}  Nausea: { :06795}  Vomiting: { :05410}  Lack of Appetite: {  :01119}   Weight Loss: { :27680}  Taste changes: { :30508}  Dry Mouth: { :14458}  Pain in Mouth/Swallowing: { :13511}  Lack of Energy: { :76369}  Difficulty Sleeping: { :22826}  Worrying: {  :04035}  Anxiety: { :44729}  Depression: { :36967}  Shortness of breath: { :63420}  Other: { :00907}      Information obtained from: { :41994}  ______________________________________________________________________        Objective       {If you would like to pull in Lab results for the last 24 hours, type .[id number] :99}  {If you would like to pull in Imaging results, type .imgrslt :99}       PHYSICAL EXAMINATION   Vital Signs:   Vital signs reviewed  There were no vitals filed for this visit.  Pain Score:        Physical Exam    ASSESSMENT/PLAN    Pain  Pain is: chronic after cancer treatment  Type: somatic and neuropathic  Pain control: sub-optimally controlled  Home regimen:   - Continue Acetaminophen 1000mg [id number] PRN  - Continue Methadone 20mg [id number]   - EKG: (6/15/23) QTc 455. (12/7/23) Qtc 447. - Next Visit   - Continue BMX 10mg QID PRN - given printed script (12/7/23) to take to [facility]  - Viscous  Lidocaine 2% - declines - caused patient to vomit due to taste  - Morphine - patient went to detox and taken off medication  - 4/20/23: Patient admitted to doing Meth, no further opioids at this time  - Referral to [facility] (9/14/2023) - never scheduled  - [facility] to [employer] [facility] dental work - planning for full teeth extraction      Opioid Use  Medication Management:   - OARRS report reviewed with no aberrant behavior; consistent with  prescriptions/records and patient history  - .  Overdose Risk Score 750. This has been discussed with patient.   - We will continue to closely monitor the patient for signs of prescription misuse including UDS, OARRS review and subjective reports at each visit.  - No concurrent benzodiazepine use   - I am a provider who either is or has consulted and collaborated with a provider certified in Hospice and Palliative Medicine and have conducted a face-face visit and examination for this patient.  - Routine Urine Drug Screen: 1/11/23 appropriately negative for illicit substances.  4/20/23, 6/29/23, 11/9/23 appropriately + for prescribed medications and  - for illicits.  - Controlled Substance Agreement: 1/11/23  - Specifically discussed that controlled substance prescriptions will only be provided by our group as outlined in the completed agreement  - Prescribed naloxone: declined 1/11/23  - Red Flags: hx of cocaine abuse; hx of suicidal attempts     Constipation  At risk for constipation related to opioids.  Usual bowel pattern: daily  Home regimen:   - Continue Senna 2 tabs 1-2x per day PRN  - Continue Miralax 17grams 1-2x per day PRN     Decreased Appetite  Related to  pain/treatment hx  Nutrition following - Lora Sanderson RDN  Home regimen:    - Continue to monitor  - Continue protein supplements     Xerostomia  - Continue Biotene Rinses 15mL QID PRN  - Continue Pilocarpine 5mg TID PRN     Supportive and Palliative Oncology Encounter:  Emotional  support provided  Coordination of care  Will continue to follow and address symptoms as needed     Advance Directives  Code Status: Full code    Next Follow-Up Visit:  Return to clinic in ***    Signature and billing  Medical complexity was { :52522} level due to due to complexity of problems, extensive data review, and high risk of management/treatment.  Time was spent on the following: { :80755}. Total time spent: ***      Data  Diagnostic tests and information reviewed for today's visit:  Most recent labs and imaging results, Medications       Some elements copied from Palliative Care note on 3/14/2024, the elements have been updated and all reflect current decision making from today, 5/8/2024.    Plan of Care discussed with: Patient    SIGNATURE: REED Yanes    Contact information:  Supportive and Palliative Oncology  Monday-Friday 8 AM-5 PM  Phone:  [phone], press option #5, then option #1.   Or Epic Secure Chat

## 2024-05-03 NOTE — PROGRESS NOTES
Cancer Follow up:  TSH: Due now    No chief complaint on file.    HPI:  Shayne Messer is a 43 year old male following up with me today for his tonsil cancer. Last seen 1/2024. He completed chemoradiation therapy 2/28/23. He has been following with Supportive oncology as well as Dr. Burkitt. He continues to struggle.  +dry mouth (severe).  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  He has been sick for the last several months and notes congestion, decreased taste,  +yellow and bloody discharge from both ears with decreased hearing.  He has bilateral PE tubes in place. His most recent CT of neck and chest are negative for disease except for the chest being consistent with mucoid impaction or aspiration      History:  Dx: T2N2M0 Right tonsil cancer, P16+  11/11/22: [facility] [place] [facility] with newly discovered right tonsil mass - discharged home  11/22/22: S/p triple endoscopy with biopsy by Dr. Murphy. Found to have right tonsil mass involving the soft palate. Exposure was difficult and it was hard to get a biopsy. Biopsy did return +SCCa, P16+.  12/22: First seen by me  1/11/23: Started chemoradiation therapy  2/28/23: Completed chemoradiation therapy  5/2/23: +in shelter, seen in ED for possible drug   5/11/23: psychiatric eval  5/17/23: PE tubes placed  6/23: PET: Complete response to tx  8/23: MBS +dysphagia recommend therapy  9/23: CT neck negative for recurrent masses, level 2 LN stable    ROS:  Review of Systems     PE:  ENT Physical Exam  Constitutional  Appearance: patient appears well-developed, grooming disheveled;  Communication/Voice: communication appropriate for developmental age; voice quality is hoarse and rough;  Head and Face  Appearance: head appears normal and face appears normal;  Salivary: glands normal;  Head and Face comments: Swelling of right body of mandible  Ear  Auricles: right auricle normal; left auricle normal;  Ear Canals: right ear canal normal; left ear canal  normal;  Tympanic Membranes: bilateral tympanic membranes tympanostomy tubes noted;  Ear comments: Bilateral tubes appear patent however yellowish discharge sitting around tympanic membrane but no purulence  Nose  Internal Nose: nasal mucosa normal; septum normal;  Oral Cavity/Oropharynx  Teeth: tooth decay (+broken teeth, no purulence or active odontogenic infection) noted;  Gums: gingiva normal;  Tongue: normal;  Oral mucosa: mucous membranes dry (moderate to severe);  OC/OP comments: Oropharynx is soft/no visible or palpable masses or lesions  Neck  Neck: radiation changes present;  Respiratory  Inspection: breathing unlabored;  Cardiovascular  Inspection: extremities are warm and well perfused;  Neurovestibular  Mental Status: alert and oriented;  Psychiatric: mood depressed; affect is flat;       Procedures   PROCEDURE NOTE:  Recommended flexible nasopharyngoscopy & laryngoscopy.  Risks, benefits, personnel and alternatives were explained.  The patient wished to proceed.  S/he was re-identified.  PROCEDURE:  Flexible nasopharyngoscopy and laryngoscopy  PREOPERATIVE DIAGNOSIS: oropharyngeal cancer  POSTOPERATIVE DIAGNOSIS: Same as above, no recurrent lesions  INDICATIONS: Inability to tolerate mirror exam  ANESTHESIA: 4% lidocaine and 0.5% phenylephrine  PROCEDURE:  With the patient sitting upright topical anesthesia and vasoconstriction was applied with spray to the right side(s) of the nose.  After waiting an appropriate period of time for anesthesia/vasoconstriction to become effective, a flexible laryngoscope was passed through the right side(s) of the nose.  Nasopharynx, oropharynx, hypopharynx and larynx were examined.  FINDINGS:  The nasopharynx and oropharynx were normal without any lesions or masses visualized.  No masses or lesions were visualized at the base of tongue, vallecula, epiglottis, aryepiglottic folds, pyriform sinuses, and lateral pharyngeal walls.  Mild radiation changes.  True vocal fold  movement was normal.  The patient's airway was widely patent with no evidence of obstruction.  Patient tolerated the procedure well, and there were no complications.     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 Anette Jerome MD.     All medical record entries made by the Scribe were at my direction and personally dictated by me, Dr. Anette Jerome. I have reviewed the chart and agree that the record accurately reflects my personal performance of the history, physical exam, discussion and plan.

## 2024-05-06 ENCOUNTER — TELEPHONE (OUTPATIENT)
Dept: PALLIATIVE MEDICINE | Facility: CLINIC | Age: 44
End: 2024-05-06
Payer: MEDICAID

## 2024-05-06 DIAGNOSIS — Z51.5 PALLIATIVE CARE ENCOUNTER: ICD-10-CM

## 2024-05-06 NOTE — TELEPHONE ENCOUNTER
I have canceled the 5/15 appointment with SINDY Small. Patient is due for an inperson appointment. I have placed an order for a  to call patient to reschedule as he did not answer my call.

## 2024-05-07 NOTE — PROGRESS NOTES
Cancer Follow up:  TSH: Due now    Chief Complaint   Patient presents with    Follow-up     HPI:  Shayne Messer is a 43 year old male following up with me today for his tonsil cancer. Last seen 1/2024. He completed chemoradiation therapy 2/28/23. He has been following with Supportive oncology as well as Dr. Burkitt. He continues to struggle.  +dry mouth (severe).  He has been seen at the dental school of his bad teeth and had some teeth pulled.  This caused him additional pain and he is going to speak with pain management today.  +Complaints about how his head/mouth feel.  Since last visit he had CT of neck and chest 3/24 are negative for disease except for the chest being consistent with mucoid impaction or aspiration      History:  Dx: T2N2M0 Right tonsil cancer, P16+  11/11/22: [facility] [place] ED with newly discovered right tonsil mass - discharged home  11/22/22: S/p triple endoscopy with biopsy by Dr. Murphy. Found to have right tonsil mass involving the soft palate. Exposure was difficult and it was hard to get a biopsy. Biopsy did return +SCCa, P16+.  12/22: First seen by me  1/11/23: Started chemoradiation therapy  2/28/23: Completed chemoradiation therapy  [facility]/23: [facility], seen in ED for possible drug   5/11/23: psychiatric eval  5/17/23: PE tubes placed  6/23: PET: Complete response to tx  8/23: MBS +dysphagia recommend therapy  9/23: CT neck negative for recurrent masses, level 2 LN stable  3/24: CT neck & chest negative for recurrent dx or metastasis, lung with possible infection    ROS:  Review of Systems   Constitutional:  Positive for fatigue. Negative for appetite change, chills, fever and unexpected weight change.   HENT:  Positive for dental problem, hearing loss, sore throat and trouble swallowing. Negative for drooling, ear pain, facial swelling, mouth sores, tinnitus and voice change.    Respiratory:  Negative for cough, shortness of breath and stridor.    Gastrointestinal:  Negative for nausea and  vomiting.   Musculoskeletal:  Positive for neck pain.   Hematological:  Negative for adenopathy.        PE:  ENT Physical Exam  Constitutional  Appearance: patient appears well-developed, grooming disheveled;  Communication/Voice: communication appropriate for developmental age; voice quality is hoarse and rough;  Head and Face  Appearance: head appears normal and face appears normal;  Salivary: glands normal;  Ear  Auricles: right auricle normal; left auricle normal;  Ear Canals: right ear canal normal; left ear canal normal;  Tympanic Membranes: bilateral tympanic membranes tympanostomy tubes noted;  Ear comments: +Bilateral tubes appear patent   Nose  Internal Nose: nasal mucosa normal; septum normal;  Oral Cavity/Oropharynx  Teeth: missing teeth (S/p dental extractions) noted;  Gums: gingiva normal;  Tongue: normal;  Oral mucosa: mucous membranes dry (moderate to severe);  OC/OP comments: Oropharynx is soft/no visible or palpable masses or lesions  Neck  Neck: radiation changes present;  Respiratory  Inspection: breathing unlabored;  Cardiovascular  Inspection: extremities are warm and well perfused;  Neurovestibular  Mental Status: alert and oriented;  Psychiatric: mood depressed; affect is flat;       Procedures   PROCEDURE NOTE:  Recommended flexible nasopharyngoscopy & laryngoscopy.  Risks, benefits, personnel and alternatives were explained.  The patient wished to proceed.  S/he was re-identified.  PROCEDURE:  Flexible nasopharyngoscopy and laryngoscopy  PREOPERATIVE DIAGNOSIS: oropharyngeal cancer  POSTOPERATIVE DIAGNOSIS: Same as above, no recurrent lesions  INDICATIONS: Inability to tolerate mirror exam  ANESTHESIA: 4% lidocaine and 0.5% phenylephrine  PROCEDURE:  With the patient sitting upright topical anesthesia and vasoconstriction was applied with spray to the right side(s) of the nose.  After waiting an appropriate period of time for anesthesia/vasoconstriction to become effective, a flexible  laryngoscope was passed through the right side(s) of the nose.  Nasopharynx, oropharynx, hypopharynx and larynx were examined.  FINDINGS:  The nasopharynx and oropharynx were normal without any lesions or masses visualized.  No masses or lesions were visualized at the base of tongue, vallecula, epiglottis, aryepiglottic folds, pyriform sinuses, and lateral pharyngeal walls.  Mild radiation changes.  True vocal fold movement was normal.  The patient's airway was widely patent with no evidence of obstruction.  Patient tolerated the procedure well, and there were no complications.     ASSESSMENT AND PLAN:  Problem List Items Addressed This Visit    None      Anette Jerome MD    Head & Neck Surgical Oncology & Reconstruction  Department of Otolaryngology - Head and Neck Surgery     By signing my name below, I, Charlene Vailibe, attest that this documentation has been prepared under the direction and in the presence of Dr. Anette Jerome MD.     All medical record entries made by the Scribe were at my direction and personally dictated by me, Dr. Anette Jerome. I have reviewed the chart and agree that the record accurately reflects my personal performance of the history, physical exam, discussion and plan.

## 2024-05-08 ENCOUNTER — APPOINTMENT (OUTPATIENT)
Dept: OTOLARYNGOLOGY | Facility: HOSPITAL | Age: 44
End: 2024-05-08
Payer: MEDICAID

## 2024-05-08 ENCOUNTER — APPOINTMENT (OUTPATIENT)
Dept: PALLIATIVE MEDICINE | Facility: HOSPITAL | Age: 44
End: 2024-05-08
Payer: MEDICAID

## 2024-05-09 ENCOUNTER — TELEPHONE (OUTPATIENT)
Dept: HEMATOLOGY/ONCOLOGY | Facility: HOSPITAL | Age: 44
End: 2024-05-09
Payer: MEDICAID

## 2024-05-09 DIAGNOSIS — Z79.891 ENCOUNTER FOR MONITORING OPIOID MAINTENANCE THERAPY: ICD-10-CM

## 2024-05-09 DIAGNOSIS — Z51.5 ENCOUNTER FOR PALLIATIVE CARE: ICD-10-CM

## 2024-05-09 DIAGNOSIS — G89.3 NEOPLASM RELATED PAIN: ICD-10-CM

## 2024-05-09 DIAGNOSIS — Z51.81 ENCOUNTER FOR MONITORING OPIOID MAINTENANCE THERAPY: ICD-10-CM

## 2024-05-09 RX ORDER — METHADONE HYDROCHLORIDE 10 MG/1
20 TABLET ORAL EVERY 8 HOURS
Qty: 180 TABLET | Refills: 0 | Status: SHIPPED | OUTPATIENT
Start: 2024-05-09 | End: 2024-05-15 | Stop reason: SDUPTHER

## 2024-05-13 ENCOUNTER — SOCIAL WORK (OUTPATIENT)
Dept: HEMATOLOGY/ONCOLOGY | Facility: HOSPITAL | Age: 44
End: 2024-05-13
Payer: MEDICAID
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
## 2024-05-15 ENCOUNTER — OFFICE VISIT (OUTPATIENT)
Dept: PALLIATIVE MEDICINE | Facility: HOSPITAL | Age: 44
End: 2024-05-15
Payer: MEDICAID

## 2024-05-15 ENCOUNTER — APPOINTMENT (OUTPATIENT)
Dept: PALLIATIVE MEDICINE | Facility: HOSPITAL | Age: 44
End: 2024-05-15
Payer: MEDICAID

## 2024-05-15 ENCOUNTER — OFFICE VISIT (OUTPATIENT)
Dept: OTOLARYNGOLOGY | Facility: HOSPITAL | Age: 44
End: 2024-05-15
Payer: MEDICAID

## 2024-05-15 ENCOUNTER — PROCEDURE VISIT (OUTPATIENT)
Dept: SPEECH THERAPY | Facility: HOSPITAL | Age: 44
End: 2024-05-15
Payer: MEDICAID

## 2024-05-15 VITALS
BODY MASS INDEX: 23.8 KG/M2 | TEMPERATURE: 96.1 F | WEIGHT: 162.5 LBS | OXYGEN SATURATION: 100 % | HEART RATE: 65 BPM | RESPIRATION RATE: 16 BRPM | DIASTOLIC BLOOD PRESSURE: 79 MMHG | SYSTOLIC BLOOD PRESSURE: 124 MMHG

## 2024-05-15 VITALS — WEIGHT: 156.7 LBS | BODY MASS INDEX: 23.21 KG/M2 | HEIGHT: 69 IN | TEMPERATURE: 97.7 F

## 2024-05-15 DIAGNOSIS — F31.32 BIPOLAR AFFECTIVE DISORDER, CURRENTLY DEPRESSED, MODERATE (MULTI): ICD-10-CM
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
## 2024-05-15 DIAGNOSIS — F19.90 ILLICIT DRUG USE: ICD-10-CM

## 2024-05-15 DIAGNOSIS — F15.10 METHAMPHETAMINE ABUSE (MULTI): ICD-10-CM

## 2024-05-15 DIAGNOSIS — Z79.891 ENCOUNTER FOR MONITORING OPIOID MAINTENANCE THERAPY: ICD-10-CM

## 2024-05-15 DIAGNOSIS — K08.9 POOR DENTITION: ICD-10-CM

## 2024-05-15 DIAGNOSIS — G89.3 NEOPLASM RELATED PAIN: ICD-10-CM

## 2024-05-15 DIAGNOSIS — R13.12 OROPHARYNGEAL DYSPHAGIA: ICD-10-CM

## 2024-05-15 DIAGNOSIS — Z51.81 ENCOUNTER FOR MONITORING OPIOID MAINTENANCE THERAPY: ICD-10-CM

## 2024-05-15 DIAGNOSIS — C09.9 MALIGNANT NEOPLASM OF TONSIL (MULTI): ICD-10-CM

## 2024-05-15 DIAGNOSIS — F32.A ANXIETY AND DEPRESSION: ICD-10-CM

## 2024-05-15 DIAGNOSIS — Z51.5 ENCOUNTER FOR PALLIATIVE CARE: ICD-10-CM

## 2024-05-15 DIAGNOSIS — R13.12 OROPHARYNGEAL DYSPHAGIA: Primary | ICD-10-CM

## 2024-05-15 DIAGNOSIS — K11.7 XEROSTOMIA: ICD-10-CM

## 2024-05-15 DIAGNOSIS — K59.03 DRUG-INDUCED CONSTIPATION: ICD-10-CM

## 2024-05-15 DIAGNOSIS — K13.79 MOUTH PAIN: ICD-10-CM

## 2024-05-15 DIAGNOSIS — F31.9 BIPOLAR DEPRESSION (MULTI): ICD-10-CM

## 2024-05-15 DIAGNOSIS — F41.9 ANXIETY AND DEPRESSION: ICD-10-CM

## 2024-05-15 DIAGNOSIS — K11.7 XEROSTOMIA DUE TO RADIOTHERAPY: Primary | ICD-10-CM

## 2024-05-15 DIAGNOSIS — Z51.5 PALLIATIVE CARE ENCOUNTER: Primary | ICD-10-CM

## 2024-05-15 LAB
AMPHETAMINES UR QL SCN: ABNORMAL
BARBITURATES UR QL SCN: ABNORMAL
BENZODIAZ UR QL SCN: ABNORMAL
BZE UR QL SCN: ABNORMAL
CANNABINOIDS UR QL SCN: ABNORMAL
FENTANYL+NORFENTANYL UR QL SCN: ABNORMAL
METHADONE UR QL SCN: ABNORMAL
METHADONE UR QL SCN: ABNORMAL
OPIATES UR QL SCN: ABNORMAL
OXYCODONE+OXYMORPHONE UR QL SCN: ABNORMAL
PCP UR QL SCN: ABNORMAL

## 2024-05-15 PROCEDURE — 99214 OFFICE O/P EST MOD 30 MIN: CPT | Performed by: OTOLARYNGOLOGY

## 2024-05-15 PROCEDURE — 3074F SYST BP LT 130 MM HG: CPT | Performed by: NURSE PRACTITIONER

## 2024-05-15 PROCEDURE — 80358 DRUG SCREENING METHADONE: CPT | Performed by: NURSE PRACTITIONER

## 2024-05-15 PROCEDURE — 3078F DIAST BP <80 MM HG: CPT | Performed by: NURSE PRACTITIONER

## 2024-05-15 PROCEDURE — 80307 DRUG TEST PRSMV CHEM ANLYZR: CPT | Performed by: NURSE PRACTITIONER

## 2024-05-15 PROCEDURE — 80349 CANNABINOIDS NATURAL: CPT | Performed by: NURSE PRACTITIONER

## 2024-05-15 PROCEDURE — 99214 OFFICE O/P EST MOD 30 MIN: CPT | Performed by: NURSE PRACTITIONER

## 2024-05-15 PROCEDURE — 3008F BODY MASS INDEX DOCD: CPT | Performed by: NURSE PRACTITIONER

## 2024-05-15 PROCEDURE — 80353 DRUG SCREENING COCAINE: CPT | Performed by: NURSE PRACTITIONER

## 2024-05-15 RX ORDER — POLYETHYLENE GLYCOL 3350 17 G/17G
17 POWDER, FOR SOLUTION ORAL DAILY PRN
Qty: 510 G | Refills: 2 | Status: SHIPPED | OUTPATIENT
Start: 2024-05-15 | End: 2024-08-13

## 2024-05-15 RX ORDER — PILOCARPINE HYDROCHLORIDE 5 MG/1
5 TABLET, FILM COATED ORAL 3 TIMES DAILY PRN
Qty: 90 TABLET | Refills: 2 | Status: SHIPPED | OUTPATIENT
Start: 2024-05-15 | End: 2024-08-13

## 2024-05-15 RX ORDER — COVID-19 ANTIGEN TEST
KIT MISCELLANEOUS
COMMUNITY
Start: 2024-03-11

## 2024-05-15 RX ORDER — METHADONE HYDROCHLORIDE 10 MG/1
20 TABLET ORAL EVERY 8 HOURS
Qty: 180 TABLET | Refills: 0 | Status: SHIPPED | OUTPATIENT
Start: 2024-05-15 | End: 2024-06-10 | Stop reason: SDUPTHER

## 2024-05-15 ASSESSMENT — PAIN SCALES - GENERAL: PAINLEVEL_OUTOF10: 4

## 2024-05-15 NOTE — PROGRESS NOTES
Following LISW received phone call from pt requesting transportation to their upcoming oncology appointments on 5/15. Rides arranged via roundtrip. Additionally, pt shares he has had a challenging time with his social security disability application. Pt states he was told by someone that [employer] has not received all the necessary medical information. LISW provided pt the phone number for Disability Determination office to connect with his examiner to discuss what specficially is needed for his case. Pt agreeable. Patient denies any further psychosocial or support service needs at this time. Patient encouraged to contact LISW if any needs arise.     LISW will continue to follow as needed.

## 2024-05-16 NOTE — PROCEDURES
Speech-Language Pathology    SLP Adult Outpatient FEES Evaluation    Patient Name: Shayne Messer  MRN: [id number]  Today's Date: 5/16/2024   Time In: 15:50  Time Out: 16:16      Reason for Consult:  Pt with hx of tonsillar SCC.  Completed chemoradiation on 2/28/23.  Pt reports nasal regurgitation with thin liquids, as well as xerostomia that is severely limiting PO intake.  Consuming mostly smoothies and thin liquids.  FEES warranted for further assessment of swallowing function and to guide appropriate diet recommendations.        Recommendations:  Solid Diet Recommendations : Puree; advance solids as tolerated   Liquid Diet Recommendations: Thin      Safe Swallow/Compensatory Strategies:  Upright positioning   Slow rate/small boluses   Alternate solids/liquids      Recommend dietitian consult for further treatment/guidance re: improving intake/meeting nutritional needs.      FEES Impression:      Mild oropharyngeal dysphagia; deficits in swallow safety and efficiency.       FEES Assessment:  Cognition:  Attention: Fair   Follows Commands: WFL   *Pt minimally cooperative throughout assessment.  Required MAX cues for minimal PO trials.      Oral Motor Assessment:  Oral Hygiene: Xerostomia   Dentition: Missing dentition; in poor condition   Oral Motor: WFL   Vocal Quality (Perceptually): WFL- no overt impaired resonances/nasal emissions      FEES:  FEES Verbal Consent: Fiberoptic Endoscopic Evaluation of Swallowing exam was completed once informed verbal consent was obtained- Yes  Scoped Passed: L nare  Following Application Of: Surgilube, Afrin   Patient Tolerated Procedure: Fair      Consistencies Trialed: Thin liquid, Mildly/nectar thick liquids, Puree solids.    *Pt adamantly refused trials of chewable solids     Anatomical Observations:  -Reduced  closure w/ air leak on voicing tasks; improved closure on L  -Bilateral vocal fold movement during voicing tasks  -No significant edema  -Moderate diffuse erythema  "  -Trace white frothy secretions in valleculae and mod clear secretions in piriforms; moderate aspiration of secretions over interarytenoid space- reflexive cough response noted to clear      Oral Phase:  Functional oral prepping with consistencies assessed.  Pt refused trials of chewable solids stating \"I can't do that\".  No nasal regurgitation noted.       Pharyngeal Phase:  White out appreciated; adequate duration.  Trace penetration with thin liquids over the interarytenoid space.  Reflexively cleared.  No penetration or aspiration with any of the other consistencies assessed.  Mild diffuse residue following the swallow at the valleculae, piriforms, and along the PPW with solids and liquids.  Largely clears with independent subsequent swallows.      *At times visualization impaired 2' to pt's poor tolerance of scope manipulation.     Plan:  Skilled Speech Therapy services are indicated to provide training and instruction regarding the completion of swallowing exercises and use of compensatory strategies.     SLP Frequency: Every 2 weeks   Duration:  8-10 weeks   Discussed POC: Pt   Pt Agreeable: Yes         Goals:  Long Term Goal-  Pt will maintain adequate nutrition/hydration with optimal safety and efficiency via PO intake on least restrictive diet without evidence of pulmonary comprise.  Date initiated: 5/15/24              Target goal date: 7/10/24              Status: Goal initiated               Progress: n/a     Short Term Goals-  Pt will independently complete EMST 5 reps, 5x day, 5 days week at 30cm H20 pressure to strengthen movement of the hyolaryngeal complex necessary for airway protection during the swallow.               Date initiated: 5/15/24              Target goal date: 7/10/24              Status: Goal initiated               Progress: n/a     Pt will independently complete an effortful swallow maneuver 10 reps 3x/day 5 days a week for improved posterior pharyngeal wall contraction/strength " necessary for bolus clearance through the pharynx.               Date initiated: 5/15/24              Target goal date: 7/10/24              Status: Goal initiated               Progress: n/a     Pt will describe compensatory swallowing strategies with > 90% accuracy via teach back method.               Date initiated: 5/15/24              Target goal date: 7/10/24              Status: Goal initiated               Progress: n/a     Education:  SLP provided education to pt re: results and recommendations following assessment.  Written information provided re: management of xerostomia.  Pt ended session abruptly saying that he needed to leave, but did note that he was agreeable to future Speech Therapy sessions.      [place]:  Thin: 4 - Material enters airway, contacts the folds, ejected from airway.  [place]: 1 - Material does not enter airway.   Puree: 1 - Material does not enter airway.        Functional Oral Intake Scale:    FIOS level Comment     Level 1  Nothing by Mouth     Level 2 Tube dependent with minimal attempts of food and liquid.     Level 3 Tube dependent with consistent oral intake of food and liquid.    x Level 4 Total oral diet of a single consistency.     Level 5 Total oral diet with multiple consistencies, but requires special preparations and compensations.     Level 6 Total oral diet with multiple consistencies without special preparations but specific food limitations.     Level 7 Total oral diet with no restrictions.          05/16/24 at 6:09 PM - Sania Posadas, SLP

## 2024-05-19 LAB — CARBOXYTHC UR-MCNC: 65 NG/ML

## 2024-05-21 ENCOUNTER — SOCIAL WORK (OUTPATIENT)
Dept: HEMATOLOGY/ONCOLOGY | Facility: HOSPITAL | Age: 44
End: 2024-05-21
Payer: MEDICAID

## 2024-05-21 LAB
EDDP UR CFM-MCNC: >1000 NG/ML
METHADONE UR CFM-MCNC: >1000 NG/ML

## 2024-05-21 NOTE — PROGRESS NOTES
LISW received phone call from pt requesting transportation for his upcoming appointment with Dr. Coto on 5/29/24. Ride arranged via pt's insurance [employer] ([phone]) Confirmation #: [id number]  : 12:30pm     Additionally, pt states he spoke to [employer] and was informed his disability claim was denied in April 2024 because he did not meet criteria for disbility. Pt does not agree with their determination and would like to appeal. Pt was unable to verify  if he completed an appeal. Pt shared his frustrations with his current inability to work and the challenges applying for disability. Pt states he was informed once an appeal was completed, [facility] would obtain updated documentation from the hospital to support his case. LISW encouraged pt to submit his appeal if he would like to move forward with his case. Pt expressed understanding. Patient denies any further psychosocial or support service needs at this time. Patient encouraged to contact LISW if any needs arise.     LISW will continue to follow as needed.

## 2024-05-27 LAB — BZE UR-MCNC: 377 NG/ML

## 2024-05-28 RX ORDER — CHLORHEXIDINE GLUCONATE ORAL RINSE 1.2 MG/ML
SOLUTION DENTAL
COMMUNITY
Start: 2024-04-09

## 2024-05-29 ENCOUNTER — OFFICE VISIT (OUTPATIENT)
Dept: BEHAVIORAL HEALTH | Facility: HOSPITAL | Age: 44
End: 2024-05-29
Payer: MEDICAID

## 2024-05-29 VITALS
BODY MASS INDEX: 24.45 KG/M2 | OXYGEN SATURATION: 99 % | TEMPERATURE: 96.6 F | RESPIRATION RATE: 16 BRPM | WEIGHT: 165.57 LBS | SYSTOLIC BLOOD PRESSURE: 126 MMHG | HEART RATE: 77 BPM | DIASTOLIC BLOOD PRESSURE: 85 MMHG

## 2024-05-29 DIAGNOSIS — F32.A ANXIETY AND DEPRESSION: Primary | ICD-10-CM

## 2024-05-29 DIAGNOSIS — F41.9 ANXIETY AND DEPRESSION: Primary | ICD-10-CM

## 2024-05-29 PROCEDURE — 99214 OFFICE O/P EST MOD 30 MIN: CPT | Mod: AM | Performed by: PSYCHIATRY & NEUROLOGY

## 2024-05-29 RX ORDER — BUSPIRONE HYDROCHLORIDE 5 MG/1
5 TABLET ORAL 2 TIMES DAILY
Qty: 60 TABLET | Refills: 1 | Status: SHIPPED | OUTPATIENT
Start: 2024-05-29 | End: 2025-05-29

## 2024-05-29 ASSESSMENT — ENCOUNTER SYMPTOMS
SHORTNESS OF BREATH: 0
UNEXPECTED WEIGHT CHANGE: 0
VOMITING: 0
CHILLS: 0
FACIAL SWELLING: 0
NAUSEA: 0
SORE THROAT: 1
NECK PAIN: 1
FEVER: 0
VOICE CHANGE: 0
COUGH: 0
TROUBLE SWALLOWING: 1
APPETITE CHANGE: 0
STRIDOR: 0
ADENOPATHY: 0
FATIGUE: 1

## 2024-05-29 ASSESSMENT — PAIN SCALES - GENERAL: PAINLEVEL: 0-NO PAIN
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
## 2024-05-30 NOTE — ASSESSMENT & PLAN NOTE
Chronic, moderate  Continue modified diet  Encouraged him to meet with SLP on a regular basis which would likely improve swallowing if he could engage with regular exercises and treatment regimen

## 2024-05-30 NOTE — ASSESSMENT & PLAN NOTE
No evidence of disease  Follow up in 3-4 months  Last imaging reviewed and is negative  TSH due, will call with results  Continue to follow with supportive oncology for assistance with chronic pain

## 2024-06-10 ENCOUNTER — TELEPHONE (OUTPATIENT)
Dept: PALLIATIVE MEDICINE | Facility: CLINIC | Age: 44
End: 2024-06-10
Payer: MEDICAID

## 2024-06-10 DIAGNOSIS — G89.3 NEOPLASM RELATED PAIN: ICD-10-CM

## 2024-06-10 DIAGNOSIS — Z51.5 ENCOUNTER FOR PALLIATIVE CARE: ICD-10-CM

## 2024-06-10 DIAGNOSIS — Z79.891 ENCOUNTER FOR MONITORING OPIOID MAINTENANCE THERAPY: ICD-10-CM

## 2024-06-10 DIAGNOSIS — Z51.81 ENCOUNTER FOR MONITORING OPIOID MAINTENANCE THERAPY: ICD-10-CM

## 2024-06-10 RX ORDER — METHADONE HYDROCHLORIDE 10 MG/1
20 TABLET ORAL EVERY 8 HOURS
Qty: 180 TABLET | Refills: 0 | Status: SHIPPED | OUTPATIENT
Start: 2024-06-10 | End: 2024-07-10

## 2024-06-10 NOTE — TELEPHONE ENCOUNTER
OARRS reviewed. Methadone last filled 5/9/2024. Refill submitted to [facility].

## 2024-06-12 ENCOUNTER — APPOINTMENT (OUTPATIENT)
Dept: BEHAVIORAL HEALTH | Facility: HOSPITAL | Age: 44
End: 2024-06-12
Payer: MEDICAID

## 2024-06-13 ENCOUNTER — CLINICAL SUPPORT (OUTPATIENT)
Dept: EMERGENCY MEDICINE | Facility: HOSPITAL | Age: 44
End: 2024-06-13
Payer: MEDICAID

## 2024-06-13 ENCOUNTER — HOSPITAL ENCOUNTER (EMERGENCY)
Facility: HOSPITAL | Age: 44
Discharge: OTHER INSTITUTION | End: 2024-06-14
Attending: EMERGENCY MEDICINE
Payer: MEDICAID

## 2024-06-13 DIAGNOSIS — F29 PSYCHOSIS, UNSPECIFIED PSYCHOSIS TYPE (MULTI): Primary | ICD-10-CM

## 2024-06-13 DIAGNOSIS — Z76.89 ENCOUNTER FOR PSYCHIATRIC ASSESSMENT: ICD-10-CM

## 2024-06-13 LAB
ALBUMIN SERPL BCP-MCNC: 4 G/DL (ref 3.4–5)
ALP SERPL-CCNC: 61 U/L (ref 33–120)
ALT SERPL W P-5'-P-CCNC: 9 U/L (ref 10–52)
AMPHETAMINES UR QL SCN: ABNORMAL
ANION GAP SERPL CALC-SCNC: 15 MMOL/L (ref 10–20)
APAP SERPL-MCNC: <10 UG/ML
AST SERPL W P-5'-P-CCNC: 21 U/L (ref 9–39)
ATRIAL RATE: 80 BPM
BARBITURATES UR QL SCN: ABNORMAL
BASOPHILS # BLD AUTO: 0.03 X10*3/UL (ref 0–0.1)
BASOPHILS NFR BLD AUTO: 0.5 %
BENZODIAZ UR QL SCN: ABNORMAL
BILIRUB SERPL-MCNC: 0.3 MG/DL (ref 0–1.2)
BUN SERPL-MCNC: 7 MG/DL (ref 6–23)
BZE UR QL SCN: ABNORMAL
CALCIUM SERPL-MCNC: 9.2 MG/DL (ref 8.6–10.6)
CANNABINOIDS UR QL SCN: ABNORMAL
CHLORIDE SERPL-SCNC: 103 MMOL/L (ref 98–107)
CO2 SERPL-SCNC: 25 MMOL/L (ref 21–32)
CREAT SERPL-MCNC: 0.88 MG/DL (ref 0.5–1.3)
EGFRCR SERPLBLD CKD-EPI 2021: >90 ML/MIN/1.73M*2
EOSINOPHIL # BLD AUTO: 0.07 X10*3/UL (ref 0–0.7)
EOSINOPHIL NFR BLD AUTO: 1.3 %
ERYTHROCYTE [DISTWIDTH] IN BLOOD BY AUTOMATED COUNT: 12.5 % (ref 11.5–14.5)
ETHANOL SERPL-MCNC: 83 MG/DL
FENTANYL+NORFENTANYL UR QL SCN: ABNORMAL
GLUCOSE SERPL-MCNC: 89 MG/DL (ref 74–99)
HCT VFR BLD AUTO: 43 % (ref 41–52)
HGB BLD-MCNC: 14.9 G/DL (ref 13.5–17.5)
HOLD SPECIMEN: NORMAL
IMM GRANULOCYTES # BLD AUTO: 0.01 X10*3/UL (ref 0–0.7)
IMM GRANULOCYTES NFR BLD AUTO: 0.2 % (ref 0–0.9)
LYMPHOCYTES # BLD AUTO: 0.64 X10*3/UL (ref 1.2–4.8)
LYMPHOCYTES NFR BLD AUTO: 11.7 %
MCH RBC QN AUTO: 32.5 PG (ref 26–34)
MCHC RBC AUTO-ENTMCNC: 34.7 G/DL (ref 32–36)
MCV RBC AUTO: 94 FL (ref 80–100)
METHADONE UR QL SCN: ABNORMAL
MONOCYTES # BLD AUTO: 0.47 X10*3/UL (ref 0.1–1)
MONOCYTES NFR BLD AUTO: 8.6 %
NEUTROPHILS # BLD AUTO: 4.25 X10*3/UL (ref 1.2–7.7)
NEUTROPHILS NFR BLD AUTO: 77.7 %
NRBC BLD-RTO: 0 /100 WBCS (ref 0–0)
OPIATES UR QL SCN: ABNORMAL
OXYCODONE+OXYMORPHONE UR QL SCN: ABNORMAL
P AXIS: 56 DEGREES
P OFFSET: 198 MS
P ONSET: 150 MS
PCP UR QL SCN: ABNORMAL
PLATELET # BLD AUTO: 208 X10*3/UL (ref 150–450)
POTASSIUM SERPL-SCNC: 3.7 MMOL/L (ref 3.5–5.3)
PR INTERVAL: 138 MS
PROT SERPL-MCNC: 6.5 G/DL (ref 6.4–8.2)
Q ONSET: 219 MS
QRS COUNT: 13 BEATS
QRS DURATION: 88 MS
QT INTERVAL: 390 MS
QTC CALCULATION(BAZETT): 449 MS
QTC FREDERICIA: 429 MS
R AXIS: 60 DEGREES
RBC # BLD AUTO: 4.58 X10*6/UL (ref 4.5–5.9)
SALICYLATES SERPL-MCNC: <3 MG/DL
SODIUM SERPL-SCNC: 139 MMOL/L (ref 136–145)
T AXIS: 64 DEGREES
T OFFSET: 414 MS
VENTRICULAR RATE: 80 BPM
WBC # BLD AUTO: 5.5 X10*3/UL (ref 4.4–11.3)

## 2024-06-13 PROCEDURE — 80053 COMPREHEN METABOLIC PANEL: CPT | Performed by: EMERGENCY MEDICINE

## 2024-06-13 PROCEDURE — 93010 ELECTROCARDIOGRAM REPORT: CPT

## 2024-06-13 PROCEDURE — 80307 DRUG TEST PRSMV CHEM ANLYZR: CPT | Performed by: EMERGENCY MEDICINE

## 2024-06-13 PROCEDURE — 93005 ELECTROCARDIOGRAM TRACING: CPT

## 2024-06-13 PROCEDURE — 80143 DRUG ASSAY ACETAMINOPHEN: CPT | Performed by: EMERGENCY MEDICINE

## 2024-06-13 PROCEDURE — 82550 ASSAY OF CK (CPK): CPT | Performed by: EMERGENCY MEDICINE

## 2024-06-13 PROCEDURE — 99285 EMERGENCY DEPT VISIT HI MDM: CPT | Performed by: EMERGENCY MEDICINE

## 2024-06-13 PROCEDURE — 36415 COLL VENOUS BLD VENIPUNCTURE: CPT | Performed by: EMERGENCY MEDICINE

## 2024-06-13 PROCEDURE — 99285 EMERGENCY DEPT VISIT HI MDM: CPT

## 2024-06-13 PROCEDURE — 85025 COMPLETE CBC W/AUTO DIFF WBC: CPT | Performed by: EMERGENCY MEDICINE

## 2024-06-13 SDOH — HEALTH STABILITY: MENTAL HEALTH: HAVE YOU EVER DONE ANYTHING, STARTED TO DO ANYTHING, OR PREPARED TO DO ANYTHING TO END YOUR LIFE?: NO

## 2024-06-13 SDOH — HEALTH STABILITY: MENTAL HEALTH: HALLUCINATION: VISUAL;AUDITORY

## 2024-06-13 SDOH — HEALTH STABILITY: MENTAL HEALTH: SUICIDE ASSESSMENT: ADULT (C-SSRS)

## 2024-06-13 SDOH — HEALTH STABILITY: MENTAL HEALTH: BEHAVIORS/MOOD: ANXIOUS;HALLUCINATIONS

## 2024-06-13 SDOH — HEALTH STABILITY: MENTAL HEALTH: HAVE YOU WISHED YOU WERE DEAD OR WISHED YOU COULD GO TO SLEEP AND NOT WAKE UP?: NO

## 2024-06-13 SDOH — HEALTH STABILITY: MENTAL HEALTH: CONTENT: DELUSIONS

## 2024-06-13 SDOH — HEALTH STABILITY: MENTAL HEALTH: HAVE YOU ACTUALLY HAD ANY THOUGHTS OF KILLING YOURSELF?: NO

## 2024-06-13 ASSESSMENT — COLUMBIA-SUICIDE SEVERITY RATING SCALE - C-SSRS
2. HAVE YOU ACTUALLY HAD ANY THOUGHTS OF KILLING YOURSELF?: NO
6. HAVE YOU EVER DONE ANYTHING, STARTED TO DO ANYTHING, OR PREPARED TO DO ANYTHING TO END YOUR LIFE?: NO
1. IN THE PAST MONTH, HAVE YOU WISHED YOU WERE DEAD OR WISHED YOU COULD GO TO SLEEP AND NOT WAKE UP?: NO

## 2024-06-13 ASSESSMENT — LIFESTYLE VARIABLES
TOTAL SCORE: 0
HAVE YOU EVER FELT YOU SHOULD CUT DOWN ON YOUR DRINKING: NO
EVER HAD A DRINK FIRST THING IN THE MORNING TO STEADY YOUR NERVES TO GET RID OF A HANGOVER: NO
HAVE PEOPLE ANNOYED YOU BY CRITICIZING YOUR DRINKING: NO
EVER FELT BAD OR GUILTY ABOUT YOUR DRINKING: NO

## 2024-06-13 ASSESSMENT — PAIN - FUNCTIONAL ASSESSMENT: PAIN_FUNCTIONAL_ASSESSMENT: 0-10

## 2024-06-13 ASSESSMENT — PAIN SCALES - GENERAL: PAINLEVEL_OUTOF10: 0 - NO PAIN

## 2024-06-13 NOTE — ED PROVIDER NOTES
[facility] Emergency Medicine   Date:  6/13/2024  Patient:  Shayne Messer  YOB: 1980  MRN:  [id number]   ED Provider: Yo Arias DO    History of Present Illness:  Patient is a 43-year-old gentleman with a history of anxiety, polysubstance abuse presenting to emergency room today via EMS with paranoia and possible hallucinations.  Patient internally stimulated on arrival.  Continues to ask what his neighbors reveals 1 or shoot him.  He states he called 911 himself during the hospital for his own protection because he feels unsafe where he is living.  He talks about his neighbors coming into her room, giving him looks, and he is fearful for his life.  Per review of his chart the patient has a long history of anxiety and depression.  He has been hospitalized frequently for similar presentations.  Does take methadone chronically but denies any other medications.  Denies plans of self-harm.  No homicidal ideation.  No additional complaints offered at time my assessment.    Limitations to history: Psychosis   Independent Historians: EMS  External Records Reviewed: Office visit from 5/29    Past Medical History     Past Medical History:   Diagnosis Date   • Bipolar 1 disorder (Multi)    • Immunization not carried out for unspecified reason     COVID-19 vaccination not done   • Other specified abnormal findings of blood chemistry     High serum cholestanol     History reviewed. No pertinent surgical history.  Social History     Tobacco Use   • Smoking status: Every Day   • Smokeless tobacco: Current   • Tobacco comments:     Vaping.   Vaping Use   • Vaping status: Never Used   Substance Use Topics   • Alcohol use: Yes     Comment: socially   • Drug use: Yes     Types: Marijuana, Cocaine     Allergies   Allergen Reactions   • Quetiapine Swelling     Swelling of the neck.     Current Outpatient Medications   Medication Instructions   • albuterol 90 mcg/actuation  inhaler INHALE 2 PUFFS BY MOUTH EVERY 4 HOURS AS NEEDED. USE WITH SPACER   • BMX ORAL SUSPENSION (1:1:1) Swish and swallow 10ml by mouth every 6 hours if needed for mucositis   • busPIRone (BUSPAR) 5 mg, oral, 2 times daily   • chlorhexidine (Peridex) 0.12 % solution FLOSS AND BRUSH TEETH, RINSE THOROUGHLY. SWISH WITH ONE CAPFUL UN...  (REFER TO PRESCRIPTION NOTES).   • Flowflex COVID-19 Ag Home Test kit    • ibuprofen 400 mg, oral, Every 8 hours   • methadone (DOLOPHINE) 20 mg, oral, Every 8 hours   • naloxone (NARCAN) 4 mg, nasal, As needed, May repeat every 2-3 minutes if needed, alternating nostrils, until medical assistance becomes available.   • nicotine polacrilex (Commit) 4 mg lozenge DISSOLVE 1 LOZENGE BY MOUTH EVERY 4 HOURS   • pilocarpine (SALAGEN) 5 mg, oral, 3 times daily PRN   • polyethylene glycol (MIRALAX) 17 g, oral, Daily PRN   • Stimulant Laxative Plus 8.6-50 mg tablet        Review of Systems:     ROS: a ten point review of systems was performed and was negative except as per HPI.    Physical Exam:    Vitals:    06/13/24 2158   BP: 146/78   Pulse: 70   Resp: 18   Temp: 36.3 °C (97.3 °F)   SpO2: 100%       Physical Exam  Constitutional:       Appearance: Normal appearance.   HENT:      Head: Normocephalic and atraumatic.      Right Ear: Tympanic membrane normal.      Left Ear: Tympanic membrane normal.      Nose: Nose normal.      Mouth/Throat:      Mouth: Mucous membranes are moist.      Pharynx: No oropharyngeal exudate or posterior oropharyngeal erythema.   Eyes:      Extraocular Movements: Extraocular movements intact.      Conjunctiva/sclera: Conjunctivae normal.   Cardiovascular:      Rate and Rhythm: Normal rate and regular rhythm.   Pulmonary:      Effort: Pulmonary effort is normal. No respiratory distress.   Abdominal:      General: There is no distension.      Palpations: Abdomen is soft.   Musculoskeletal:         General: No swelling or deformity. Normal range of motion.      Cervical  back: Normal range of motion.   Skin:     General: Skin is warm and dry.   Neurological:      General: No focal deficit present.      Mental Status: He is alert and oriented to person, place, and time.      Gait: Gait normal.   Psychiatric:         Mood and Affect: Mood is anxious.         Speech: Speech is rapid and pressured and tangential.         Behavior: Behavior is hyperactive.           Diagnostics:    Labs Reviewed   CBC WITH AUTO DIFFERENTIAL - Abnormal       Result Value    WBC 5.5      nRBC 0.0      RBC 4.58      Hemoglobin 14.9      Hematocrit 43.0      MCV 94      MCH 32.5      MCHC 34.7      RDW 12.5      Platelets 208      Neutrophils % 77.7      Immature Granulocytes %, Automated 0.2      Lymphocytes % 11.7      Monocytes % 8.6      Eosinophils % 1.3      Basophils % 0.5      Neutrophils Absolute 4.25      Immature Granulocytes Absolute, Automated 0.01      Lymphocytes Absolute 0.64 (*)     Monocytes Absolute 0.47      Eosinophils Absolute 0.07      Basophils Absolute 0.03     COMPREHENSIVE METABOLIC PANEL - Abnormal    Glucose 89      Sodium 139      Potassium 3.7      Chloride 103      Bicarbonate 25      Anion Gap 15      Urea Nitrogen 7      Creatinine 0.88      eGFR >90      Calcium 9.2      Albumin 4.0      Alkaline Phosphatase 61      Total Protein 6.5      AST 21      Bilirubin, Total 0.3      ALT 9 (*)    DRUG SCREEN,URINE - Abnormal    Amphetamine Screen, Urine Presumptive Negative      Barbiturate Screen, Urine Presumptive Negative      Benzodiazepines Screen, Urine Presumptive Negative      Cannabinoid Screen, Urine Presumptive Positive (*)     Cocaine Metabolite Screen, Urine Presumptive Positive (*)     Fentanyl Screen, Urine Presumptive Negative      Opiate Screen, Urine Presumptive Negative      Oxycodone Screen, Urine Presumptive Negative      PCP Screen, Urine Presumptive Negative      Methadone Screen, Urine Presumptive Positive (*)     Narrative:     Drug screen results are  presumptive and should not be used to assess   compliance with prescribed medication. Contact the performing [facility] laboratory   to add-on definitive confirmatory testing if clinically indicated.    Toxicology screening results are reported qualitatively. The concentration must   be greater than or equal to the cutoff to be reported as positive. The concentration   at which the screening test can detect an individual drug or metabolite varies.   The absence of expected drug(s) and/or drug metabolite(s) may indicate non-compliance,   inappropriate timing of specimen collection relative to drug administration, poor drug   absorption, diluted/adulterated urine, or limitations of testing. For medical purposes   only; not valid for forensic use.    Interpretive questions should be directed to the laboratory medical directors.   ACUTE TOXICOLOGY PANEL, BLOOD - Abnormal    Acetaminophen <10.0      Salicylate  <3      Alcohol 83 (*)    DRUG SCREEN,URINE   URINALYSIS WITH REFLEX MICROSCOPIC       No orders to display       Medications - No data to display      Hospital Course / Medical Decision Making:    Diagnoses as of 06/17/24 0727   Psychosis, unspecified psychosis type (Multi)   Encounter for psychiatric assessment       Patient is a 43-year-old gentleman presenting to the emergency room with paranoia.  Was brought in for psychiatry evaluation.  Missed using crack cocaine earlier.  Arrives awake and alert, talking about how his neighbors want to kill him, crying, tearful.  Per chart review does have a history of anxiety and substance abuse in the past.  Required medical clearance prior to EPAT evaluation.  Labs, EKG obtained.  Disposition pending EPAT.        Diagnosis  1.  Paranoia    Disposition: Handoff    Critical Care Time: N/A    Yo Arias DO  EM Attending      Yo Arias DO  06/13/24 1280       Yo Arias,   06/17/24 6467

## 2024-06-14 VITALS
RESPIRATION RATE: 16 BRPM | DIASTOLIC BLOOD PRESSURE: 77 MMHG | OXYGEN SATURATION: 99 % | TEMPERATURE: 97.3 F | SYSTOLIC BLOOD PRESSURE: 114 MMHG | HEART RATE: 62 BPM | HEIGHT: 69 IN | BODY MASS INDEX: 23.99 KG/M2 | WEIGHT: 162 LBS

## 2024-06-14 LAB — CK SERPL-CCNC: 114 U/L (ref 0–325)

## 2024-06-14 SDOH — HEALTH STABILITY: MENTAL HEALTH: WISH TO BE DEAD (PAST 1 MONTH): NO

## 2024-06-14 SDOH — ECONOMIC STABILITY: HOUSING INSECURITY: FEELS SAFE LIVING IN HOME: NO

## 2024-06-14 SDOH — HEALTH STABILITY: MENTAL HEALTH: ANXIETY SYMPTOMS: UNEXPLAINED FEARS;FEELINGS OF DOOM

## 2024-06-14 SDOH — HEALTH STABILITY: MENTAL HEALTH: NON-SPECIFIC ACTIVE SUICIDAL THOUGHTS (PAST 1 MONTH): NO

## 2024-06-14 SDOH — HEALTH STABILITY: MENTAL HEALTH: SUICIDAL BEHAVIOR (LIFETIME): YES

## 2024-06-14 SDOH — HEALTH STABILITY: MENTAL HEALTH: IN THE PAST FEW WEEKS, HAVE YOU FELT THAT YOU OR YOUR FAMILY WOULD BE BETTER OFF IF YOU WERE DEAD?: NO

## 2024-06-14 SDOH — HEALTH STABILITY: MENTAL HEALTH: IN THE PAST FEW WEEKS, HAVE YOU WISHED YOU WERE DEAD?: NO

## 2024-06-14 SDOH — HEALTH STABILITY: MENTAL HEALTH: DEPRESSION SYMPTOMS: NO PROBLEMS REPORTED OR OBSERVED.

## 2024-06-14 SDOH — HEALTH STABILITY: MENTAL HEALTH: SUICIDAL BEHAVIOR (3 MONTHS): NO

## 2024-06-14 SDOH — HEALTH STABILITY: MENTAL HEALTH: HAVE YOU EVER TRIED TO KILL YOURSELF?: YES

## 2024-06-14 SDOH — HEALTH STABILITY: MENTAL HEALTH: IN THE PAST WEEK, HAVE YOU BEEN HAVING THOUGHTS ABOUT KILLING YOURSELF?: NO

## 2024-06-14 SDOH — HEALTH STABILITY: MENTAL HEALTH: ARE YOU HAVING THOUGHTS OF KILLING YOURSELF RIGHT NOW?: NO

## 2024-06-14 ASSESSMENT — LIFESTYLE VARIABLES: SUBSTANCE_ABUSE_PAST_12_MONTHS: YES

## 2024-06-14 NOTE — PROGRESS NOTES
EPAT - Social Work Psychiatric Assessment    Arrival Details  Mode of Arrival: Ambulatory  Admission Source: Home  Admission Type: Voluntary  EPAT Assessment Start Date: 06/14/24  EPAT Assessment Start Time: 0219  Name of : JESSICA Crabtree LSW    History of Present Illness  Admission Reason: Paranoia  HPI: Patient is a 43 year old [place] male, with a history of Schizoaffective D/O, Bipolar D/O, MDD, VERENICE, ADHD, Alcohol Abuse, Polysubstance (Cocaine, Amphetamines) Abuse, presenting to the ED for a psychiatric evaluation. ED notes indicate that the pt “called [facility] after he was having paranoid thoughts of people trying to kill him,” and stated in the ED that he “is not sure if the chicken he had last night was real or human.”      Further review of patient’s chart reflects pervious ED visits with reports of/ concern for psychosis, elizabeth, SI, and/or intoxication. Pt has multiple past inpatient psychiatric admissions, the last being in July of 2022, and multiple past suicide attempts via OD, with the last being at that same time.  Chart indicates the pt is connected with  psychiatry at the [facility], and last met with his provider on 5/29/24. At that time, pt denied all acute psych symptoms, and was fixated on obtaining Ativan for his anxiety. Pt was started on Buspar, admitted to stopping his Zoloft on his own, and refused all other meds offered.     Readmission Information   Readmission within 30 Days: No    Psychiatric Symptoms  Anxiety Symptoms: Unexplained fears, Feelings of doom  Depression Symptoms: No problems reported or observed.  Elizabeth Symptoms: Pressured speech, Less need to sleep, Increased energy    Psychosis Symptoms  Hallucination Type: Auditory, Visual  Delusion Type: Paranoid, Persecutory    Additional Symptoms - Adult  Generalized Anxiety Disorder: Restlessness, Excessive anxiety/worry, Difficult to control worry, Sleep disturbance  Obsessive Compulsive Disorder: No problems  reported or observed.  Panic Attack: No problems reported or observed.  Post Traumatic Stress Disorder: No problems reported or observed.  Delirium: No problems reported or observed.    Past Psychiatric History:   Psychiatric Diagnosis: Hx of Schizoaffective D/O, Bipolar D/O, MDD, VERENICE, ADHD, Alcohol Abuse, Polysubstance (Cocaine, Amphetamines) Abuse    OP Mental Health Tx:  Psychiatry ([facility])     IP Psych Admissions: [facility] 7/2022, 4/2022; [facility] 3/2022; [facility] 5/2019, 12/2018;  [facility] 4/2017; [facility] 8/2016    Self-harm or Suicide Attempts: SA via OD attempt 7/2022, and multiple prior, per chart.    Past Psychiatric Meds/Treatments: Buspar currently. Pt reports he never picked it up from the pharmacy.    Past Violence/Victimization History: Hx of Assault charges, per chart.    Current Mental Health Contacts   Name/Phone Number: None   Last Appointment Date: -  Provider Name/Phone Number: David Coto MD  Provider Last Appointment Date: 5/29/24    Support System:  (unable to assess)    Living Arrangement: Apartment, Lives alone    Home Safety  Feels Safe Living in Home: No (Due to paranoid delusions)    Income Information  Employment Status for: Patient  Employment Status:  (Unable to assess)    [place] Service/Education History  Current or Previous  Service:  (Unable to assess)  Education Level: High school (per chart)  History of Learning Problems:  (unable to assess)  History of School Behavior Problems: Yes (Per chart- “skipped a lot and missed a lot due to being in [facility].”)    Social/Cultural History  Important Activities:  (unable to assess)    Legal  Assistance with Managing/Advocating Healthcare Needs:  (none, own guardian)  Criminal Activity/ Legal Involvement Pertinent to Current Situation/ Hospitalization: Per chart- Hx of charges for assault, theft, Disorderly Conduct, DUI.    Drug Screening  Have you used any substances (canabis, cocaine,  heroin, hallucinogens, inhalants, etc.) in the past 12 months?: Yes  Have you used any prescription drugs other than prescribed in the past 12 months?:  (unk)  Is a toxicology screen needed?: Yes    Stage of Change  Stage of Change: Precontemplation  History of Treatment: Inpatient, AA/NA meetings, Sober living, Dual  Type of Treatment Offered: Inpatient (Dual)  Treatment Offered: Accepted  Duration of Substance Use: unk  Frequency of Substance Use: unk  Age of First Substance Use: unk    Psychosocial  Psychosocial (WDL): Exceptions to WDL  Behaviors/Mood: Anxious, Calm, Cooperative, Delusions, Fearful, Paranoid, Restless    Orientation  Orientation Level: Oriented X4    General Appearance  Motor Activity: Restlessness  Speech Pattern: Pressured, Rapid, Repetitive, Perseverative  General Attitude: Cooperative, Pleasant  Appearance/Hygiene: Unremarkable    Thought Process  Coherency: Tangential, [place] thinking, Loose associations  Content: Blaming others, Confabulation, Delusions, Preoccupation  Delusions: Paranoid, Persecutory  Perception: Hallucinations  Hallucination: Auditory, Visual  Judgment/Insight: Impaired  Confusion: None  Cognition: Appropriate for developmental age, Impulsive    Risk Factors  Self Harm/Suicidal Ideation Plan: Denies, none known or reported otherwise.  Previous Self Harm/Suicidal Plans: Past OD attempts.  Risk Factors: Major mental illness, Male, Past history of violence, Persecutory delusions, Substance abuse    Violence Risk Assessment  Assessment of Violence:  (unk)  Thoughts of Harm to Others: No    Ability to Assess Risk Screen  Risk Screen - Ability to Assess: Able to be screened  Ask Suicide-Screening Questions  1. In the past few weeks, have you wished you were dead?: No  2. In the past few weeks, have you felt that you or your family would be better off if you were dead?: No  3. In the past week, have you been having thoughts about killing yourself?: No  4. Have you ever tried  to kill yourself?: Yes  5. Are you having thoughts of killing yourself right now?: No  Calculated Risk Score: Potential Risk  [place] Suicide Severity Rating Scale (Screener/Recent Self-Report)  1. Wish to be Dead (Past 1 Month): No  2. Non-Specific Active Suicidal Thoughts (Past 1 Month): No  6. Suicidal Behavior (Lifetime): Yes  6. Suicidal Behavior (3 Months): No  Calculated C-SSRS Risk Score (Lifetime/Recent): Moderate Risk  Step 1: Risk Factors  Current & Past Psychiatric Dx: Mood disorder, Psychotic disorder, Alcohol/substance abuse disorders, ADHD  Presenting Symptoms: Impulsivity, Anxiety and/or panic, Psychosis, Insomia  Family History:  (Per chart- Mom, dad, sister have hx of Depression.)  Precipitants/Stressors: Substance intoxication or withdrawal  Change in Treatment: Change in provider or treament (i.e., medications, psychotherapy, milieu), Non-compliant or not receiving treatment  Access to Lethal Methods : No  Step 2: Protective Factors   Protective Factors Internal:  (unable to assess)  Protective Factors External:  (unable to assess)  Step 5: Documentation  Risk Level: Moderate suicide risk    Psychiatric Impression and Plan of Care  Assessment and Plan: Patient was reportedly calm and cooperative with ED staff which continued with this writer. Pt was pleasant, but very fixated on his delusion, therefore any additional information was difficult to obtain from the pt. Pt reported that he came to the ED because “people in his building were trying to kill him, the whole building was looking for him, screaming out for him, saying ‘where are you mother janine,’ he was hiding up in the stairwell and they couldn’t find him.” Pt explained that he came out from his hiding place and went to visit 2 different individuals in the building, but the people came in looking for him, so he “had to dart back out of there or he wouldn’t be here right now, he’d be dead.” This writer asked pt if he knew why these  "people may be trying to harm him, and pt stated “well as crazy as it sounds, this meat he ate the other night, he knows it sounds crazy, but he’s thinking he may have been eating somebody, he doesn’t know who and it’s crazy and messed up, but it was supposed to be a chicken drum and he’s not sure it was, which makes him think that they were trying to eat him, maybe that’s what they wanted, he was supposed to be dinner, they wanted to  him and eat him.” When asked about any support system outside of his apartment building, pt stated “oh no, they were asking about his family, they may have eaten them, he may have eaten them.” When asked how he supports himself financially, the pt yelled “the whole building are cannibals!” Pt did deny SI, HI, and access to guns.      Patient is recommended for inpatient psychiatric admission due to symptoms of acute arley and psychosis, which may be substance induced, but it has been persistent for several hours without any alleviation and is causing pt acute psychiatric distress. ED attending in agreement.       Diagnosis: Bipolar D/O with psychotic features, Polysubstance Abuse    Outcome/Disposition  Patient's Perception of Outcome Achieved: \"ok, they're gonna think I'm crazy though\"  Assessment, Recommendations and Risk Level Reviewed with: Dr. Brennan Watson  EPAT Assessment Completed Date: 06/14/24  EPAT Assessment Completed Time: 0258    Social Work Note  "

## 2024-06-14 NOTE — ED TRIAGE NOTES
Patient called [facility] after he was having paranoid thoughts of people trying to kill him. Patient states that when its your time to go you know and now he is not sure if the chicken he had last night was real or human.

## 2024-06-14 NOTE — SIGNIFICANT EVENT
Application for Emergency Admission      Ready for Transfer?  Is the patient medically cleared for transfer to inpatient psychiatry: Yes  Has the patient been accepted to an inpatient psychiatric hospital: Yes    Application for Emergency Admission  IN ACCORDANCE WITH [place]  The Chief Clinical Officer of: [facility] 6/14/2024 .8:48 AM    Reason for Hospitalization  The undersigned has reason to believe that: Shayne Messer Is a mentally ill person subject to hospitalization by court order under division B Section 5122.01 of the Revised Code, i.e., this person:    1.No  Represents a substantial risk of physical harm to self as manifested by evidence of threats of, or attempts at, suicide or serious self-inflicted bodily harm    2.No Represents a substantial risk of physical harm to others as manifested by evidence of recent homicidal or other violent behavior, evidence of recent threats that place another in reasonable fear of violent behavior and serious physical harm, or other evidence of present dangerousness    3.Yes Represents a substantial and immediate risk of serious physical impairment or injury to self as manifested by  evidence that the person is unable to provide for and is not providing for the person's basic physical needs because of the person's mental illness and that appropriate provision for those needs cannot be made  immediately available in the community    4.Yes Would benefit from treatment in a hospital for his mental illness and is in need of such treatment as manifested by evidence of behavior that creates a grave and imminent risk to substantial rights of others or  himself.    5.Yes Would benefit from treatment as manifested by evidence of behavior that indicates all of the following:       (a) The person is unlikely to survive safely in the community without supervision, based on a clinical determination.       (b) The person has a history of lack of compliance with  treatment for mental illness and one of the following applies:      (i) At least twice within the thirty-six months prior to the filing of an affidavit seeking court-ordered treatment of the person under section 5122.111 of the Revised Code, the lack of compliance has been a significant factor in necessitating hospitalization in a hospital or receipt of services in a forensic or other mental health unit of a correctional facility, provided that the thirty-six-month period shall be extended by the length of any hospitalization or incarceration of the person that occurred within the thirty-six-month period.      (ii) Within the forty-eight months prior to the filing of an affidavit seeking court-ordered treatment of the person under section 5122.111 of the Revised Code, the lack of compliance resulted in one or more acts of serious violent behavior toward self or others or threats of, or attempts at, serious physical harm to self or others, provided that the forty-eight-month period shall be extended by the length of any hospitalization or incarceration of the person that occurred within the forty-eight-month period.      (c) The person, as a result of mental illness, is unlikely to voluntarily participate in necessary treatment.       (d) In view of the person's treatment history and current behavior, the person is in need of treatment in order to prevent a relapse or deterioration that would be likely to result in substantial risk of serious harm to the person or others.    (e) Represents a substantial risk of physical harm to self or others if allowed to remain at liberty pending examination.    Therefore, it is requested that said person be admitted to the above named facility.    STATEMENT OF BELIEF    Must be filled out by one of the following: a psychiatrist, licensed physician, licensed clinical psychologist, health or ,  or .  (Statement shall include the circumstances under  which the individual was taken into custody and the reason for the person's belief that hospitalization is necessary. The statement shall also include a reference to efforts made to secure the individual's property at his residence if he was taken into custody there. Every reasonable and appropriate effort should be made to take this person into custody in the least conspicuous manner possible.)    Decompensated schizophrenia    Darien Posada MD 6/14/2024     _____________________________________________________________   Place of Employment: [employer]    STATEMENT OF OBSERVATION BY PSYCHIATRIST, LICENSED PHYSICIAN, OR LICENSED CLINICAL PSYCHOLOGIST, IF APPLICABLE    Place of Observation (e.g., [facility], general hospital, office, emergency facility)  (If applicable, please complete)    Darien Posada MD 6/14/2024    _____________________________________________________________

## 2024-06-14 NOTE — PROGRESS NOTES
I received signout at 0100.  Please see the previous ED provider note for HPI, PE and MDM up to the time of shift change.    Medically cleared.  EPAT evaluated patient and recommended inpatient psychiatric admission.    Diagnoses as of 06/14/24 0621   Psychosis, unspecified psychosis type (Multi)   Encounter for psychiatric assessment

## 2024-07-09 ENCOUNTER — TELEPHONE (OUTPATIENT)
Dept: HEMATOLOGY/ONCOLOGY | Facility: HOSPITAL | Age: 44
End: 2024-07-09
Payer: MEDICAID

## 2024-07-09 DIAGNOSIS — Z79.891 ENCOUNTER FOR MONITORING OPIOID MAINTENANCE THERAPY: ICD-10-CM

## 2024-07-09 DIAGNOSIS — Z72.0 NICOTINE ABUSE: ICD-10-CM

## 2024-07-09 DIAGNOSIS — Z51.5 ENCOUNTER FOR PALLIATIVE CARE: ICD-10-CM

## 2024-07-09 DIAGNOSIS — G89.3 NEOPLASM RELATED PAIN: ICD-10-CM

## 2024-07-09 DIAGNOSIS — Z51.81 ENCOUNTER FOR MONITORING OPIOID MAINTENANCE THERAPY: ICD-10-CM

## 2024-07-09 RX ORDER — ASPIRIN/CALCIUM CARB/MAGNESIUM 325 MG
TABLET ORAL
Qty: 180 LOZENGE | Refills: 1 | Status: SHIPPED | OUTPATIENT
Start: 2024-07-09

## 2024-07-09 RX ORDER — METHADONE HYDROCHLORIDE 10 MG/1
20 TABLET ORAL EVERY 8 HOURS
Qty: 180 TABLET | Refills: 0 | Status: SHIPPED | OUTPATIENT
Start: 2024-07-09 | End: 2024-08-08

## 2024-07-09 RX ORDER — POLYETHYLENE GLYCOL 3350 17 G/17G
17 POWDER, FOR SOLUTION ORAL DAILY PRN
Qty: 510 G | Refills: 2 | Status: SHIPPED | OUTPATIENT
Start: 2024-07-09 | End: 2024-10-07

## 2024-07-09 NOTE — TELEPHONE ENCOUNTER
OARRs reviewed. Last Methadone fill 6/10/2024. Patient has follow-up scheduled 8/14/2024. Methadone, Miralax, and Nicotine Lozenges submitted to [facility].

## 2024-07-12 ENCOUNTER — TELEPHONE (OUTPATIENT)
Dept: HEMATOLOGY/ONCOLOGY | Facility: HOSPITAL | Age: 44
End: 2024-07-12

## 2024-07-18 ENCOUNTER — APPOINTMENT (OUTPATIENT)
Dept: HEMATOLOGY/ONCOLOGY | Facility: HOSPITAL | Age: 44
End: 2024-07-18
Payer: MEDICAID

## 2024-07-19 ENCOUNTER — TELEPHONE (OUTPATIENT)
Dept: PALLIATIVE MEDICINE | Facility: HOSPITAL | Age: 44
End: 2024-07-19
Payer: MEDICAID

## 2024-07-19 NOTE — TELEPHONE ENCOUNTER
Patient stated he has not  received his refill request for the following medication, methadone 10mg. Patient is urgently requesting the refill.

## 2024-07-19 NOTE — TELEPHONE ENCOUNTER
I spoke with [facility] and they only have 153 tabs of Methadone in stock.  I advised that they fill for this quantity and patient aware he will forgo remaining 27 tabs. I then spoke with Shayne and he is aware of the partial fill and will need to call early next month as he will be 27 tabs short. He is agreeable to this plan.

## 2024-07-25 ENCOUNTER — SOCIAL WORK (OUTPATIENT)
Dept: CASE MANAGEMENT | Facility: HOSPITAL | Age: 44
End: 2024-07-25
Payer: MEDICAID

## 2024-07-25 NOTE — PROGRESS NOTES
Introduced self and role to Shayne. Shayne stated he needed assistance with transportation. JUNI called [facility] and his insurance stated that he did not have the benefit. Will continue to assess for future transport. Updated him that transportation was set through [employer] for a  at 12:45 for his appointment at 1:00. Informed him of how to access ride information and will review again in person on 7/31. Additionally JUNI informed him he can call to reschedule his SLP appointment, JUNI provided Shayne the number to call. Will continue to follow.

## 2024-07-30 ENCOUNTER — SOCIAL WORK (OUTPATIENT)
Dept: CASE MANAGEMENT | Facility: HOSPITAL | Age: 44
End: 2024-07-30
Payer: MEDICAID

## 2024-07-30 NOTE — PROGRESS NOTES
JUNI called Shayne this morning to remind him of transportation at 12:45 tomorrow for his appointment with Dr. Burkitt. No answer and cannot leave vm. Texted Shayne of reminder. Will continue to follow.

## 2024-07-31 ENCOUNTER — APPOINTMENT (OUTPATIENT)
Dept: HEMATOLOGY/ONCOLOGY | Facility: HOSPITAL | Age: 44
End: 2024-07-31
Payer: MEDICAID

## 2024-07-31 ENCOUNTER — SOCIAL WORK (OUTPATIENT)
Dept: CASE MANAGEMENT | Facility: HOSPITAL | Age: 44
End: 2024-07-31
Payer: MEDICAID

## 2024-07-31 NOTE — PROGRESS NOTES
JUNI coordinated trip to appointment, but Shayne did not get into ride. JUNI attempted calling him with no answer and cannot leave vm. Shayne rescheduled appointments for 8/14 , SW to coordinate transportation to those appointments. Will continue to follow.

## 2024-08-02 PROBLEM — R45.851 SUICIDAL IDEATIONS: Status: ACTIVE | Noted: 2023-05-12

## 2024-08-02 PROBLEM — R59.0 LOCALIZED ENLARGED LYMPH NODES: Status: ACTIVE | Noted: 2022-11-09

## 2024-08-02 PROBLEM — R09.81 NASAL CONGESTION: Status: ACTIVE | Noted: 2023-09-26

## 2024-08-02 PROBLEM — R11.2 NAUSEA, VOMITING, AND DIARRHEA: Status: ACTIVE | Noted: 2024-08-02

## 2024-08-02 PROBLEM — G89.29 OTHER CHRONIC PAIN: Status: ACTIVE | Noted: 2023-05-12

## 2024-08-02 PROBLEM — F19.10 SUBSTANCE ABUSE (MULTI): Status: ACTIVE | Noted: 2024-08-02

## 2024-08-02 PROBLEM — R22.1 NECK MASS: Status: ACTIVE | Noted: 2024-08-02

## 2024-08-02 PROBLEM — T50.902A INTENTIONAL DRUG OVERDOSE (MULTI): Status: ACTIVE | Noted: 2024-08-02

## 2024-08-02 PROBLEM — I10 HYPERTENSION: Status: ACTIVE | Noted: 2024-08-02

## 2024-08-02 PROBLEM — R10.30 LOWER ABDOMINAL PAIN, UNSPECIFIED: Status: ACTIVE | Noted: 2023-05-11

## 2024-08-02 PROBLEM — F31.9 BIPOLAR DISORDER (MULTI): Status: ACTIVE | Noted: 2024-08-02

## 2024-08-02 PROBLEM — F41.8 DEPRESSION WITH ANXIETY: Status: ACTIVE | Noted: 2024-08-02

## 2024-08-02 PROBLEM — E66.811 CLASS 1 OBESITY: Status: ACTIVE | Noted: 2024-08-02

## 2024-08-02 PROBLEM — F33.9 MAJOR DEPRESSIVE DISORDER, RECURRENT EPISODE (CMS-HCC): Status: ACTIVE | Noted: 2018-04-10

## 2024-08-02 PROBLEM — R39.198 OTHER DIFFICULTIES WITH MICTURITION: Status: ACTIVE | Noted: 2023-05-11

## 2024-08-02 PROBLEM — H93.13 TINNITUS, BILATERAL: Status: ACTIVE | Noted: 2023-05-12

## 2024-08-02 PROBLEM — Z79.891 LONG TERM (CURRENT) USE OF OPIATE ANALGESIC: Status: ACTIVE | Noted: 2023-05-12

## 2024-08-02 PROBLEM — R19.7 DIARRHEA: Status: ACTIVE | Noted: 2023-09-26

## 2024-08-02 PROBLEM — F32.A DEPRESSIVE DISORDER: Status: ACTIVE | Noted: 2024-08-02

## 2024-08-02 PROBLEM — F15.20: Status: ACTIVE | Noted: 2018-04-10

## 2024-08-02 PROBLEM — J03.90 TONSILLITIS: Status: ACTIVE | Noted: 2024-08-02

## 2024-08-02 PROBLEM — K59.00 CONSTIPATION: Status: ACTIVE | Noted: 2023-05-11

## 2024-08-02 PROBLEM — K21.9 GASTROESOPHAGEAL REFLUX DISEASE: Status: ACTIVE | Noted: 2024-08-02

## 2024-08-02 PROBLEM — C44.1292 SQUAMOUS CELL CARCINOMA (SCC) OF LOWER EYELID OF LEFT EYE: Status: ACTIVE | Noted: 2024-08-02

## 2024-08-02 PROBLEM — N52.9 IMPOTENCE OF ORGANIC ORIGIN: Status: ACTIVE | Noted: 2024-08-02

## 2024-08-02 PROBLEM — R53.83 LETHARGY: Status: ACTIVE | Noted: 2023-05-02

## 2024-08-02 PROBLEM — Z20.822 CONTACT WITH AND (SUSPECTED) EXPOSURE TO COVID-19: Status: ACTIVE | Noted: 2023-09-25

## 2024-08-02 PROBLEM — Z87.891 PERSONAL HISTORY OF NICOTINE DEPENDENCE: Status: ACTIVE | Noted: 2023-09-26

## 2024-08-02 PROBLEM — R19.7 NAUSEA, VOMITING, AND DIARRHEA: Status: ACTIVE | Noted: 2024-08-02

## 2024-08-02 PROBLEM — R11.2 NAUSEA WITH VOMITING, UNSPECIFIED: Status: ACTIVE | Noted: 2023-09-26

## 2024-08-02 PROBLEM — R55 COLLAPSE: Status: ACTIVE | Noted: 2024-08-02

## 2024-08-02 PROBLEM — R06.02 SHORTNESS OF BREATH: Status: ACTIVE | Noted: 2023-05-12

## 2024-08-02 PROBLEM — R00.0 TACHYCARDIA, UNSPECIFIED: Status: ACTIVE | Noted: 2022-11-09

## 2024-08-02 PROBLEM — E78.5 HYPERLIPIDEMIA: Status: ACTIVE | Noted: 2024-08-02

## 2024-08-02 PROBLEM — E66.9 CLASS 1 OBESITY: Status: ACTIVE | Noted: 2024-08-02

## 2024-08-02 PROBLEM — Z79.899 OTHER LONG TERM (CURRENT) DRUG THERAPY: Status: ACTIVE | Noted: 2022-11-09

## 2024-08-02 PROBLEM — R52 PAIN, UNSPECIFIED: Status: ACTIVE | Noted: 2023-09-25

## 2024-08-02 PROBLEM — E66.9 OBESITY: Status: ACTIVE | Noted: 2024-08-02

## 2024-08-02 PROBLEM — F41.9 ANXIETY DISORDER, UNSPECIFIED: Status: ACTIVE | Noted: 2023-09-26

## 2024-08-02 PROBLEM — F20.9 SCHIZOPHRENIA (MULTI): Status: ACTIVE | Noted: 2024-08-02

## 2024-08-02 PROBLEM — R45.1 RESTLESSNESS AND AGITATION: Status: ACTIVE | Noted: 2023-05-12

## 2024-08-02 PROBLEM — R63.8 OTHER SYMPTOMS AND SIGNS CONCERNING FOOD AND FLUID INTAKE: Status: ACTIVE | Noted: 2022-11-09

## 2024-08-02 PROBLEM — T40.3X6A: Status: ACTIVE | Noted: 2023-09-26

## 2024-08-02 PROBLEM — F22 DELUSIONAL DISORDER (MULTI): Status: ACTIVE | Noted: 2018-04-10

## 2024-08-02 PROBLEM — R22.0 MASS OF HEAD: Status: ACTIVE | Noted: 2024-08-02

## 2024-08-02 PROBLEM — F32.A DEPRESSION, UNSPECIFIED: Status: ACTIVE | Noted: 2023-05-12

## 2024-08-02 PROBLEM — M54.2 CERVICALGIA: Status: ACTIVE | Noted: 2022-11-09

## 2024-08-02 PROBLEM — R22.1 MASS OF NECK: Status: ACTIVE | Noted: 2022-11-09

## 2024-08-02 PROBLEM — J39.2 THROAT MASS: Status: ACTIVE | Noted: 2024-08-02

## 2024-08-02 PROBLEM — F20.0 PARANOID SCHIZOPHRENIA (MULTI): Status: ACTIVE | Noted: 2024-08-02

## 2024-08-02 PROBLEM — R10.9 ABDOMINAL PAIN: Status: ACTIVE | Noted: 2024-08-02

## 2024-08-02 PROBLEM — R05.9 COUGH, UNSPECIFIED: Status: ACTIVE | Noted: 2023-09-25

## 2024-08-02 PROBLEM — J39.2 OTHER DISEASES OF PHARYNX: Status: ACTIVE | Noted: 2022-11-09

## 2024-08-02 RX ORDER — TADALAFIL 20 MG/1
1 TABLET ORAL
COMMUNITY
Start: 2024-05-07

## 2024-08-02 RX ORDER — DEXAMETHASONE 4 MG/1
TABLET ORAL
COMMUNITY
Start: 2023-01-16

## 2024-08-02 RX ORDER — SERTRALINE HYDROCHLORIDE 50 MG/1
TABLET, FILM COATED ORAL
COMMUNITY
Start: 2024-07-05 | End: 2024-08-07 | Stop reason: SDUPTHER

## 2024-08-02 RX ORDER — DEXTROAMPHETAMINE SACCHARATE, AMPHETAMINE ASPARTATE, DEXTROAMPHETAMINE SULFATE AND AMPHETAMINE SULFATE 7.5; 7.5; 7.5; 7.5 MG/1; MG/1; MG/1; MG/1
TABLET ORAL EVERY 12 HOURS
COMMUNITY
End: 2024-08-07 | Stop reason: SINTOL

## 2024-08-02 RX ORDER — LISINOPRIL AND HYDROCHLOROTHIAZIDE 12.5; 2 MG/1; MG/1
TABLET ORAL
COMMUNITY

## 2024-08-02 RX ORDER — ONDANSETRON 4 MG/1
TABLET, FILM COATED ORAL EVERY 6 HOURS PRN
COMMUNITY
Start: 2023-01-16

## 2024-08-02 RX ORDER — CYCLOBENZAPRINE HCL 10 MG
TABLET ORAL
COMMUNITY
Start: 2022-11-01

## 2024-08-02 RX ORDER — SILDENAFIL 100 MG/1
TABLET, FILM COATED ORAL
COMMUNITY
Start: 2022-11-01

## 2024-08-02 RX ORDER — CHLORPROMAZINE HYDROCHLORIDE 50 MG/1
TABLET, FILM COATED ORAL
COMMUNITY
Start: 2024-07-10 | End: 2024-08-07 | Stop reason: SDUPTHER

## 2024-08-02 RX ORDER — HYDROXYZINE PAMOATE 25 MG/1
1 CAPSULE ORAL
COMMUNITY
Start: 2023-09-26 | End: 2024-08-07 | Stop reason: WASHOUT

## 2024-08-02 RX ORDER — PROCHLORPERAZINE MALEATE 10 MG
TABLET ORAL EVERY 6 HOURS PRN
COMMUNITY
Start: 2023-01-16

## 2024-08-06 ENCOUNTER — SOCIAL WORK (OUTPATIENT)
Dept: CASE MANAGEMENT | Facility: HOSPITAL | Age: 44
End: 2024-08-06
Payer: MEDICAID

## 2024-08-06 NOTE — PROGRESS NOTES
JUNI called Shayne to inform him of tomorrow's transportation set for  at 3:00 via [employer]. Shayne's phone went straight to , left  to return call. Will continue to follow.

## 2024-08-07 ENCOUNTER — OFFICE VISIT (OUTPATIENT)
Dept: BEHAVIORAL HEALTH | Facility: HOSPITAL | Age: 44
End: 2024-08-07
Payer: MEDICAID

## 2024-08-07 ENCOUNTER — SOCIAL WORK (OUTPATIENT)
Dept: CASE MANAGEMENT | Facility: HOSPITAL | Age: 44
End: 2024-08-07
Payer: MEDICAID

## 2024-08-07 VITALS
BODY MASS INDEX: 23.63 KG/M2 | SYSTOLIC BLOOD PRESSURE: 121 MMHG | OXYGEN SATURATION: 99 % | WEIGHT: 160 LBS | RESPIRATION RATE: 16 BRPM | HEART RATE: 69 BPM | DIASTOLIC BLOOD PRESSURE: 72 MMHG | TEMPERATURE: 96.4 F

## 2024-08-07 DIAGNOSIS — F32.A ANXIETY AND DEPRESSION: ICD-10-CM

## 2024-08-07 DIAGNOSIS — F41.9 ANXIETY AND DEPRESSION: ICD-10-CM

## 2024-08-07 DIAGNOSIS — F20.0 PARANOID SCHIZOPHRENIA (MULTI): Primary | ICD-10-CM

## 2024-08-07 PROCEDURE — RXMED WILLOW AMBULATORY MEDICATION CHARGE

## 2024-08-07 RX ORDER — CHLORPROMAZINE HYDROCHLORIDE 50 MG/1
200 TABLET, FILM COATED ORAL NIGHTLY
Qty: 120 TABLET | Refills: 1 | Status: SHIPPED | OUTPATIENT
Start: 2024-08-07

## 2024-08-07 RX ORDER — SERTRALINE HYDROCHLORIDE 50 MG/1
50 TABLET, FILM COATED ORAL DAILY
Qty: 30 TABLET | Refills: 1 | Status: SHIPPED | OUTPATIENT
Start: 2024-08-07

## 2024-08-07 ASSESSMENT — PAIN SCALES - GENERAL: PAINLEVEL_OUTOF10: 6

## 2024-08-07 NOTE — PROGRESS NOTES
JUNI called Shayne to provide updates for transportation to his appt today, no answer. Could not leave vm at this time. Will continue to follow.
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
## 2024-08-09 NOTE — PROGRESS NOTES
SUPPORTIVE AND PALLIATIVE ONCOLOGY OUTPATIENT FOLLOW-UP      SERVICE DATE: 8/14/2024    Subjective   HISTORY OF PRESENT ILLNESS: Shayne Messer is a 43 y.o. male who presents with depression with suicidal attempts, bipolar disorder, tobacco abuse, illicit drug abuse, and diagnosis of Tonsillar Squamous Cell Carcinoma. Patient is following with Dr. Burkitt as primary oncologist. Patient has been referred to Supportive Oncology/Palliative Care for neoplasm related pain and further symptom management.       Pain Assessment:  Pain Score:    Location:    Description:      Symptom Assessment:  Pain:{ :49553}  Numbness or Tingling in hands/feet/other: { :24335}  Sore Muscles/Spasms: { :89576}  Headache: {  :61613}  Dizziness:{ :73743}  Constipation: { :84586}  Diarrhea: { :47892}  Nausea: { :63331}  Vomiting: { :13846}  Lack of Appetite: {  :73090}   Weight Loss: { :04804}  Taste changes: { :67968}  Dry Mouth: { :25118}  Pain in Mouth/Swallowing: { :58029}  Lack of Energy: { :02377}  Difficulty Sleeping: { :89942}  Worrying: {  :80185}  Anxiety: { :62911}  Depression: { :16586}  Shortness of breath: { :02591}  Other: { :97869}      Information obtained from: { :49480}  ______________________________________________________________________        Objective      Latest Reference Range & Units 05/15/24 14:42   Methadone <25 ng/mL >1,000 (H)   Amphetamine Screen, Urine Presumptive Negative  Presumptive Negative   Barbiturate Screen, Urine Presumptive Negative  Presumptive Negative   PCP Screen, Urine Presumptive Negative  Presumptive Negative   Opiate Screen, Urine Presumptive Negative  Presumptive Negative   Cannabinoid Screen, Urine Presumptive Negative  Presumptive Positive !   Fentanyl Screen, Urine Presumptive Negative  Presumptive Negative   Oxycodone Screen, Urine Presumptive Negative  Presumptive Negative   Benzodiazepines Screen, Urine Presumptive Negative  Presumptive Negative   Methadone Screen, Urine Presumptive  Negative   Presumptive Negative  Presumptive Positive !  Presumptive Positive !   Cocaine Metabolite Screen, Urine Presumptive Negative  Presumptive Positive !   EDDP <25 ng/mL >1,000 (H)   Benzoylecgonine, Urine ng/mL 377   55-Qkh-8-carboxy-THC, Urn, Quant ng/mL 65   (H): Data is abnormally high  !: Data is abnormal     Latest Reference Range & Units 06/13/24 20:08   Amphetamine Screen, Urine Presumptive Negative  Presumptive Negative   Barbiturate Screen, Urine Presumptive Negative  Presumptive Negative   PCP Screen, Urine Presumptive Negative  Presumptive Negative   Opiate Screen, Urine Presumptive Negative  Presumptive Negative   Cannabinoid Screen, Urine Presumptive Negative  Presumptive Positive !   Fentanyl Screen, Urine Presumptive Negative  Presumptive Negative   Oxycodone Screen, Urine Presumptive Negative  Presumptive Negative   Benzodiazepines Screen, Urine Presumptive Negative  Presumptive Negative   Methadone Screen, Urine Presumptive Negative  Presumptive Positive !   Cocaine Metabolite Screen, Urine Presumptive Negative  Presumptive Positive !   !: Data is abnormal       PHYSICAL EXAMINATION   Vital Signs:   Vital signs reviewed  There were no vitals filed for this visit.  Pain Score:        Physical Exam    ASSESSMENT/PLAN    Pain  Pain is: chronic after cancer treatment  Type: somatic and neuropathic  Pain control: sub-optimally controlled  Home regimen:   - Continue Acetaminophen 1000mg [id number] PRN  - Continue Methadone 20mg [id number]   - EKG: (6/15/23) QTc 455. (12/7/23) Qtc 447. (5/15/2024) Qtc 433. Repeat with Methadone increase, or once yearly  - Continue BMX 10mg QID PRN  - Viscous Lidocaine 2% - declines - caused patient to vomit due to taste  - Morphine - patient went to detox and taken off medication  - 4/20/23: Patient admitted to doing Meth, no further opioids at this time  - Referral to [facility] (9/14/2023) - never scheduled  - [facility] to [employer] for  dental work - planning for full teeth extraction      Opioid Use  Medication Management:   - OARRS report reviewed with no aberrant behavior; consistent with  prescriptions/records and patient history  - .  Overdose Risk Score 540. This has been discussed with patient.   - We will continue to closely monitor the patient for signs of prescription misuse including UDS, OARRS review and subjective reports at each visit.  - No concurrent benzodiazepine use   - I am a provider who either is or has consulted and collaborated with a provider certified in Hospice and Palliative Medicine and have conducted a face-face visit and examination for this patient.  - Routine Urine Drug Screen: 1/11/23 appropriately negative for illicit substances.  4/20/23, 6/29/23, 11/9/23 appropriately + for prescribed medications and  - for illicits. 5/15/2024 + for cocaine. 6/13/24 + cocaine. Pending 8/14/2024  - Controlled Substance Agreement: 5/15/2024  - Specifically discussed that controlled substance prescriptions will only be provided by our group as outlined in the completed agreement  - Prescribed naloxone: declined 1/11/23  - Red Flags: hx of cocaine abuse; hx of suicidal attempts     Constipation  At risk for constipation related to opioids.  Usual bowel pattern: daily  Home regimen:   - Continue Senna 2 tabs 1-2x per day PRN  - Continue Miralax 17grams 1-2x per day PRN     Decreased Appetite  Related to  pain/treatment hx  Nutrition following - Lora Sanderson RDN  Home regimen:    - Continue to monitor  - Continue protein supplements     Xerostomia  - Continue Biotene Rinses 15mL QID PRN  - Continue Pilocarpine 5mg TID PRN     Supportive and Palliative Oncology Encounter:  Emotional support provided  Coordination of care  Will continue to follow and address symptoms as needed     Advance Directives  Code Status: Full code    Next Follow-Up Visit:  Return to clinic in ***    Signature and billing  Medical complexity was {  :72546} level due to due to complexity of problems, extensive data review, and high risk of management/treatment.  Time was spent on the following: { :58557}. Total time spent: ***      Data  Diagnostic tests and information reviewed for today's visit:  Most recent labs and imaging results, Medications     Some elements copied from Palliative Care note on 5/15/2024, the elements have been updated and all reflect current decision making from today, 8/14/2024.      Plan of Care discussed with: { :92687}    SIGNATURE: SINDY Yanes-CNP    Contact information:  Supportive and Palliative Oncology  Monday-Friday 8 AM-5 PM  Phone:  [phone], press option #5, then option #1.   Or Epic Secure Chat

## 2024-08-12 ENCOUNTER — SOCIAL WORK (OUTPATIENT)
Dept: CASE MANAGEMENT | Facility: HOSPITAL | Age: 44
End: 2024-08-12
Payer: MEDICAID

## 2024-08-12 ENCOUNTER — TELEPHONE (OUTPATIENT)
Dept: PALLIATIVE MEDICINE | Facility: CLINIC | Age: 44
End: 2024-08-12
Payer: MEDICAID

## 2024-08-12 ENCOUNTER — TELEPHONE (OUTPATIENT)
Dept: HEMATOLOGY/ONCOLOGY | Facility: HOSPITAL | Age: 44
End: 2024-08-12
Payer: MEDICAID

## 2024-08-12 DIAGNOSIS — Z51.81 ENCOUNTER FOR MONITORING OPIOID MAINTENANCE THERAPY: ICD-10-CM

## 2024-08-12 DIAGNOSIS — K04.7 DENTAL INFECTION: ICD-10-CM

## 2024-08-12 DIAGNOSIS — Z51.5 ENCOUNTER FOR PALLIATIVE CARE: ICD-10-CM

## 2024-08-12 DIAGNOSIS — G89.3 NEOPLASM RELATED PAIN: ICD-10-CM

## 2024-08-12 DIAGNOSIS — G89.29 CHRONIC DENTAL PAIN: Primary | ICD-10-CM

## 2024-08-12 DIAGNOSIS — K08.9 CHRONIC DENTAL PAIN: Primary | ICD-10-CM

## 2024-08-12 DIAGNOSIS — Z79.891 ENCOUNTER FOR MONITORING OPIOID MAINTENANCE THERAPY: ICD-10-CM

## 2024-08-12 PROCEDURE — RXMED WILLOW AMBULATORY MEDICATION CHARGE

## 2024-08-12 RX ORDER — CHLORHEXIDINE GLUCONATE ORAL RINSE 1.2 MG/ML
15 SOLUTION DENTAL AS NEEDED
Qty: 473 ML | Refills: 2 | Status: SHIPPED | OUTPATIENT
Start: 2024-08-12

## 2024-08-12 RX ORDER — METHADONE HYDROCHLORIDE 10 MG/1
20 TABLET ORAL EVERY 8 HOURS
Qty: 90 TABLET | Refills: 0 | Status: SHIPPED | OUTPATIENT
Start: 2024-08-12 | End: 2024-08-31

## 2024-08-12 NOTE — TELEPHONE ENCOUNTER
Patient last seen by SINDY Small on 5/15 with plan to continue methadone 20mg q8h. Follow up visit is scheduled for 8/14. OARRS reviewed and no aberrancy noted. Patient last filled methadone 10mg #153/25 days on 7/19. Prescription pended to provider to approve.

## 2024-08-12 NOTE — PROGRESS NOTES
Shayne called  this morning, returned call. Call sent to , left message to return call. Will continue to follow.

## 2024-08-13 ENCOUNTER — SOCIAL WORK (OUTPATIENT)
Dept: CASE MANAGEMENT | Facility: HOSPITAL | Age: 44
End: 2024-08-13
Payer: MEDICAID

## 2024-08-13 NOTE — PROGRESS NOTES
Shayne returned SW call, stated that he was in crippling mouth pain. He stated that he did not want to go to the ED, but would like a refill of medication or antibiotics to help. JUNI reached out to team, PAULA Sigala reached out to discuss further. Shayne asked JUNI to assist in finding dental clinic that is not [facility]. JUNI found [facility] that offers a sliding scale fee. Will provide this information to Shayne. Will continue to follow.

## 2024-08-13 NOTE — PROGRESS NOTES
JUNI contacted Shayne, no answer. Left vm to return call to review dental clinics that JUNI located that provide low/reduced fees and/or sliding scale fees. These clinics are [facility]  [phone] and [facility] [phone]. Will continue to follow.

## 2024-08-14 ENCOUNTER — APPOINTMENT (OUTPATIENT)
Dept: HEMATOLOGY/ONCOLOGY | Facility: HOSPITAL | Age: 44
End: 2024-08-14
Payer: MEDICAID

## 2024-08-14 ENCOUNTER — APPOINTMENT (OUTPATIENT)
Dept: PALLIATIVE MEDICINE | Facility: HOSPITAL | Age: 44
End: 2024-08-14
Payer: MEDICAID

## 2024-08-14 DIAGNOSIS — Z79.891 ENCOUNTER FOR MONITORING OPIOID MAINTENANCE THERAPY: Primary | ICD-10-CM

## 2024-08-14 DIAGNOSIS — Z51.81 ENCOUNTER FOR MONITORING OPIOID MAINTENANCE THERAPY: Primary | ICD-10-CM

## 2024-08-15 DIAGNOSIS — C10.9 OROPHARYNX CANCER (MULTI): Primary | ICD-10-CM

## 2024-08-16 ENCOUNTER — TELEPHONE (OUTPATIENT)
Dept: PALLIATIVE MEDICINE | Facility: CLINIC | Age: 44
End: 2024-08-16
Payer: MEDICAID

## 2024-08-16 ENCOUNTER — PHARMACY VISIT (OUTPATIENT)
Dept: PHARMACY | Facility: CLINIC | Age: 44
End: 2024-08-16
Payer: MEDICAID

## 2024-08-16 NOTE — TELEPHONE ENCOUNTER
Refill sent to [facility] pharmacy by Elissa Small on 8/12 for methadone 20 mg every 8 hours.  Patient called to update.  No answer.  Voicemail message left for patient to call office with any further questions or concerns.

## 2024-08-19 ENCOUNTER — SOCIAL WORK (OUTPATIENT)
Dept: CASE MANAGEMENT | Facility: HOSPITAL | Age: 44
End: 2024-08-19
Payer: MEDICAID

## 2024-08-19 NOTE — PROGRESS NOTES
Shayne called JUNI to coordinate transportation. JUNI shared that transport has been set up.  for 8/22 @ 1:00. He shared that another appointment was made today, it is not reflecting in chart at this time. JUNI to check schedule. JUNI asked Shayne how his dental issues were, he shared that his mouth is no longer swollen and he feels much better. SW to continue to follow.

## 2024-08-19 NOTE — PROGRESS NOTES
Transportation Referral     Multiple Rides Needed? Yes -   First Date Transportation is needed?   Ambulation: Independently  Pick-up Address: [address] [address]  Patient Phone: [phone]  Accompaniment: No  Phone Receives Text Messages? Yes  Transportation Service: Roundtrip      Scheduled Ride(s):     Date: 8/22/2024  Appointment: Palliative  Drop off Address: [address] [place] [address]   Time: 1:00  Return Ride: will call  Confirmation: n/a    Date: 9/25/2024  Appointment: Behavioral health  Drop off Address: [address] [place] [address]   Time: 1:45  Return Ride: will call  Confirmation: n/a    Pt agreeable to plan. SW will continue to follow as needed.

## 2024-08-19 NOTE — PROGRESS NOTES
SUPPORTIVE AND PALLIATIVE ONCOLOGY OUTPATIENT FOLLOW-UP      SERVICE DATE: 8/22/2024    Subjective   HISTORY OF PRESENT ILLNESS: Shayne Messer is a 43 y.o. male who presents with depression with suicidal attempts, bipolar disorder, tobacco abuse, illicit drug abuse, and diagnosis of Tonsillar Squamous Cell Carcinoma. Patient is following with Dr. Burkitt as primary oncologist. Patient has been referred to Supportive Oncology/Palliative Care for neoplasm related pain and further symptom management.       Pain Assessment:  Pain Score:  8  Location:  teeth  Description:  constant sharp, throbbing    Symptom Assessment:  Pain:somewhat - patient has been in need of getting remainder of his teeth pulled. He has not further followed-up with [facility] because he was not happy with the care there and that they did not give him pain medications after they pulled half of his teeth. Tooth pain is majority of patient's current pain. He continues on Methadone. Has run out of BMX. Discussed great concern with patient that when seen in the ED x2 prior to today's visit, he has had 2 UDS + for cocaine. Discussed with patient if he chooses to continue to use cocaine while being prescribed methadone, Methadone will be weaned down and off. He admits today that his urine will likely be +. Discussed he will need to come back month to give a UDS, and if next sample is again + for cocaine, Methadone will be weaned off  Numbness or Tingling in hands/feet/other: none  Sore Muscles/Spasms: none  Headache: none  Dizziness:none  Constipation: none  Diarrhea: none  Nausea: none  Vomiting: none  Lack of Appetite: none   Weight Loss: none  Taste changes: none  Dry Mouth: none  Pain in Mouth/Swallowing: none  Lack of Energy: none  Difficulty Sleeping: none  Worrying: none  Anxiety: none  Depression: none  Shortness of breath: none  Other: none      Information obtained from: chart review and interview of  patient  ______________________________________________________________________        Objective      Latest Reference Range & Units 05/15/24 14:42   Methadone <25 ng/mL >1,000 (H)   Amphetamine Screen, Urine Presumptive Negative  Presumptive Negative   Barbiturate Screen, Urine Presumptive Negative  Presumptive Negative   PCP Screen, Urine Presumptive Negative  Presumptive Negative   Opiate Screen, Urine Presumptive Negative  Presumptive Negative   Cannabinoid Screen, Urine Presumptive Negative  Presumptive Positive !   Fentanyl Screen, Urine Presumptive Negative  Presumptive Negative   Oxycodone Screen, Urine Presumptive Negative  Presumptive Negative   Benzodiazepines Screen, Urine Presumptive Negative  Presumptive Negative   Methadone Screen, Urine Presumptive Negative   Presumptive Negative  Presumptive Positive !  Presumptive Positive !   Cocaine Metabolite Screen, Urine Presumptive Negative  Presumptive Positive !   EDDP <25 ng/mL >1,000 (H)   Benzoylecgonine, Urine ng/mL 377   77-[id number]-carboxy-THC, Urn, Quant ng/mL 65   (H): Data is abnormally high  !: Data is abnormal     Latest Reference Range & Units 06/13/24 20:08   Amphetamine Screen, Urine Presumptive Negative  Presumptive Negative   Barbiturate Screen, Urine Presumptive Negative  Presumptive Negative   PCP Screen, Urine Presumptive Negative  Presumptive Negative   Opiate Screen, Urine Presumptive Negative  Presumptive Negative   Cannabinoid Screen, Urine Presumptive Negative  Presumptive Positive !   Fentanyl Screen, Urine Presumptive Negative  Presumptive Negative   Oxycodone Screen, Urine Presumptive Negative  Presumptive Negative   Benzodiazepines Screen, Urine Presumptive Negative  Presumptive Negative   Methadone Screen, Urine Presumptive Negative  Presumptive Positive !   Cocaine Metabolite Screen, Urine Presumptive Negative  Presumptive Positive !   !: Data is abnormal       PHYSICAL EXAMINATION   Vital Signs:   Vital signs reviewed       "6/13/2024     8:19 PM 6/13/2024     9:58 PM 6/14/2024     3:44 AM 6/14/2024     8:58 AM 6/14/2024    10:45 AM 8/7/2024     3:29 PM 8/22/2024     1:26 PM   Vitals   Systolic 124 146 113 118 114 121 107   Diastolic 80 78 71 76 77 72 67   Heart Rate 87 70 62 54 62 69 82   Temp 36.9 °C (98.4 °F) 36.3 °C (97.3 °F)    35.8 °C (96.4 °F) 36.3 °C (97.3 °F)   Resp 18 18 16 16 16 16 18   Height (in) 1.753 m (5' 9\")         Weight (lb) 162     160 164.24   BMI 23.92 kg/m2     23.63 kg/m2 24.25 kg/m2   BSA (m2) 1.89 m2     1.88 m2 1.9 m2   Visit Report      Report Report     Physical Exam  Vitals reviewed.   Constitutional:       Appearance: Normal appearance.   HENT:      Head: Normocephalic.   Cardiovascular:      Rate and Rhythm: Normal rate and regular rhythm.   Pulmonary:      Effort: Pulmonary effort is normal.   Abdominal:      General: Abdomen is flat. Bowel sounds are normal.      Palpations: Abdomen is soft.   Musculoskeletal:         General: Normal range of motion.   Skin:     General: Skin is warm and dry.   Neurological:      General: No focal deficit present.      Mental Status: He is alert and oriented to person, place, and time. Mental status is at baseline.   Psychiatric:         Mood and Affect: Mood normal.         Behavior: Behavior normal.         Thought Content: Thought content normal.         Judgment: Judgment normal.       ASSESSMENT/PLAN    Pain  Pain is: chronic after cancer treatment  Type: somatic and neuropathic  Pain control: sub-optimally controlled  Home regimen:   - Continue Acetaminophen 1000mg [id number] PRN  - Continue Methadone 20mg [id number]   - EKG: (6/15/23) QTc 455. (12/7/23) Qtc 447. (5/15/2024) Qtc 433. Repeat with Methadone increase, or once yearly  - Continue BMX 10mg QID PRN  - Start Oralgel 10% 4x daily PRN for tooth pain  - Viscous Lidocaine 2% - declines - caused patient to vomit due to taste  - Morphine - patient went to detox and taken off medication  - 4/20/23: Patient admitted " to doing Meth, no further opioids at this time  - Referral to [facility] (9/14/2023) - never scheduled  - [facility] [employer] [facility] - planning for full teeth extraction      Opioid Use  Medication Management:   - OARRS report reviewed with no aberrant behavior; consistent with  prescriptions/records and patient history  - .  Overdose Risk Score 540. This has been discussed with patient.   - We will continue to closely monitor the patient for signs of prescription misuse including UDS, OARRS review and subjective reports at each visit.  - No concurrent benzodiazepine use   - I am a provider who either is or has consulted and collaborated with a provider certified in Hospice and Palliative Medicine and have conducted a face-face visit and examination for this patient.  - Routine Urine Drug Screen: 1/11/23 appropriately negative for illicit substances.  4/20/23, 6/29/23, 11/9/23 appropriately + for prescribed medications and  - for illicits. 5/15/2024 + for cocaine. 6/13/24 + cocaine. Pending 8/22/2024  - Controlled Substance Agreement: 5/15/2024  - Specifically discussed that controlled substance prescriptions will only be provided by our group as outlined in the completed agreement  - Prescribed naloxone: declined 1/11/23  - Red Flags: hx of cocaine abuse; hx of suicidal attempts     Constipation  At risk for constipation related to opioids.  Usual bowel pattern: daily  Home regimen:   - Continue Senna 2 tabs 1-2x per day PRN  - Continue Miralax 17grams 1-2x per day PRN     Decreased Appetite  Related to  pain/treatment hx  Nutrition following - Lora Sanderson RDN  Home regimen:    - Continue to monitor  - Continue protein supplements     Xerostomia  - Continue Biotene Rinses 15mL QID PRN  - Continue Pilocarpine 5mg TID PRN     Supportive and Palliative Oncology Encounter:  Emotional support provided  Coordination of care  Will continue to follow and address symptoms as  needed     Advance Directives  Code Status: Full code    Next Follow-Up Visit:  Return to clinic in 1 month in person    Signature and billing  Medical complexity was moderate level due to due to complexity of problems, extensive data review, and high risk of management/treatment.  Time was spent on the following: Prep Time, Time Directly with Patient/Family/Caregiver, Documentation Time. Total time spent: 35      Data  Diagnostic tests and information reviewed for today's visit:  Most recent labs and imaging results, Medications     Some elements copied from Palliative Care note on 5/15/2024, the elements have been updated and all reflect current decision making from today, 8/22/2024.      Plan of Care discussed with: Patient    SIGNATURE: REED Yanes    Contact information:  Supportive and Palliative Oncology  Monday-Friday 8 AM-5 PM  Phone:  [phone], press option #5, then option #1.   Or Epic Secure Chat

## 2024-08-21 ENCOUNTER — APPOINTMENT (OUTPATIENT)
Dept: HEMATOLOGY/ONCOLOGY | Facility: HOSPITAL | Age: 44
End: 2024-08-21
Payer: MEDICAID

## 2024-08-21 ENCOUNTER — NUTRITION (OUTPATIENT)
Dept: HEMATOLOGY/ONCOLOGY | Facility: HOSPITAL | Age: 44
End: 2024-08-21
Payer: MEDICAID

## 2024-08-21 ENCOUNTER — SOCIAL WORK (OUTPATIENT)
Dept: CASE MANAGEMENT | Facility: HOSPITAL | Age: 44
End: 2024-08-21
Payer: MEDICAID

## 2024-08-21 DIAGNOSIS — R13.10 DYSPHAGIA, UNSPECIFIED TYPE: Primary | ICD-10-CM

## 2024-08-21 NOTE — PROGRESS NOTES
"NUTRITION Follow-up NOTE    Nutrition Assessment     Reason for Visit:  Shayne Messer is a 43 y.o. male who presents for No chief complaint on file.    Diagnosis: Tonillar Cancer    Treatment: s/p chemoradiation 1/11-2/28/22     Subjective: Spoke with Shayne regarding nutrition. He endorses still tolerating only liquids. Tries to make smoothies - fruit, protein powder. Not eating solid foods due to dentition and painful swallowing. He was having swelling in his mouth with pain accompanying. Might have broken tooth, will be evaluated in [facility] clinic on 8/22. He is struggling to gain weight. Would benefit from increasing Boost to 5x/day, pt is agreeable to plan.        5/29/2024     1:53 PM 6/13/2024     8:19 PM 6/13/2024     9:58 PM 6/14/2024     3:44 AM 6/14/2024     8:58 AM 6/14/2024    10:45 AM 8/7/2024     3:29 PM   Vitals   Systolic 126 124 146 113 118 114 121   Diastolic 85 80 78 71 76 77 72   Heart Rate 77 87 70 62 54 62 69   Temp 35.9 °C (96.6 °F) 36.9 °C (98.4 °F) 36.3 °C (97.3 °F)    35.8 °C (96.4 °F)   Resp 16 18 18 16 16 16 16   Height (in)  1.753 m (5' 9\")        Weight (lb) 165.57 162     160   BMI 24.45 kg/m2 23.92 kg/m2     23.63 kg/m2   BSA (m2) 1.91 m2 1.89 m2     1.88 m2   Visit Report Report      Report       Wt Readings from Last 10 Encounters:   08/07/24 72.6 kg (160 lb)   06/13/24 73.5 kg (162 lb)   05/29/24 75.1 kg (165 lb 9.1 oz)   05/15/24 71.1 kg (156 lb 11.2 oz)   05/15/24 73.7 kg (162 lb 8 oz)   03/14/24 73.6 kg (162 lb 4.1 oz)   02/14/24 73.1 kg (161 lb 2.5 oz)   02/08/24 74.3 kg (163 lb 11.2 oz)   01/17/24 73.7 kg (162 lb 7.7 oz)   01/10/24 74.7 kg (164 lb 9.6 oz)     Weight Change:  Weight stable, though pt desires weight gain  Significant Weight Change: No    Lab Results   Component Value Date/Time    GLUCOSE 89 06/13/2024 2017     06/13/2024 2017    K 3.7 06/13/2024 2017     06/13/2024 2017    CO2 25 06/13/2024 2017    ANIONGAP 15 06/13/2024 2017    BUN 7 06/13/2024 2017 " "   CREATININE 0.88 06/13/2024 2017    EGFR >90 06/13/2024 2017    CALCIUM 9.2 06/13/2024 2017    ALBUMIN 4.0 06/13/2024 2017    ALKPHOS 61 06/13/2024 2017    PROT 6.5 06/13/2024 2017    AST 21 06/13/2024 2017    BILITOT 0.3 06/13/2024 2017    ALT 9 (L) 06/13/2024 2017     No results found for: \"VITD25\"     Food And Nutrient Intake:  Current Intake: >75%  of estimated energy needs  Pt taking 4x Boost VHC/day     Nutrition Focused Physical Exam Findings:     Subcutaneous Fat Loss  Orbital Fat Pads: Well nourshed (slightly bulging fat pads)  Buccal Fat Pads: Well nourished (full, rounded cheeks)  Triceps: Well nourished (ample fat tissue)  Ribs: Well nourished (full chest, ribs do not protrude)     Muscle Wasting  Temporalis: Well nourished (well-defined muscle)  Pectoralis (Clavicular Region): Well nourished (clavicle not visible)  Deltoid/Trapezius: Well nourished (rounded appearance at arm, shoulder, neck)  Quadriceps: Well nourished (well developed, well rounded)  Gastrocnemius: Well nourished (well developed bulbous muscle)     Edema  Edema: none     ESTIMATED ENERGY NEEDS  Dosing weight: 73.6 kg  Calories per day: 0816-3239  determined by 25-30 kcal/kg  Protein (g) per day:  determined by 1.0-1.5 g/kg  Estimated fluid needs: 6088-3536 determined by 1 kcal/mL     NUTRITION DIAGNOSIS     Nutrition Diagnosis  Patient has Nutrition Diagnosis: Yes  Diagnosis Status (1): New  Nutrition Diagnosis 1: Inadequate oral intake  Related to (1): H/N cancer  As Evidenced by (1): difficulty chewing and swallowing     No malnutrition diagnosis at this time.     NUTRITION INTERVENTION  -Boost Plus to 5x/day (DME is [place])  -Smoothies - continue with fruits and protein powder  -Encourage adequate fluid intake + electrolytes  -Continue to try other soft/moist easy to chew/swallow foods    Diet Education Materials: High bina, high protein nutrition, high bina/protein snack ideas, smoothie recipe.    MONITOR AND " EVALUATION  Monitor and Evaluation: Oral intake, Weight trend, Nutrition Supplement use, and Fluid Intake    Need for follow-up: PRN    Time spent with patient: 20 minutes

## 2024-08-22 ENCOUNTER — OFFICE VISIT (OUTPATIENT)
Dept: PALLIATIVE MEDICINE | Facility: HOSPITAL | Age: 44
End: 2024-08-22
Payer: MEDICAID

## 2024-08-22 ENCOUNTER — LAB (OUTPATIENT)
Dept: LAB | Facility: HOSPITAL | Age: 44
End: 2024-08-22
Payer: MEDICAID

## 2024-08-22 ENCOUNTER — OFFICE VISIT (OUTPATIENT)
Dept: HEMATOLOGY/ONCOLOGY | Facility: HOSPITAL | Age: 44
End: 2024-08-22
Payer: MEDICAID

## 2024-08-22 ENCOUNTER — SOCIAL WORK (OUTPATIENT)
Dept: CASE MANAGEMENT | Facility: HOSPITAL | Age: 44
End: 2024-08-22

## 2024-08-22 VITALS
WEIGHT: 164.24 LBS | BODY MASS INDEX: 24.25 KG/M2 | TEMPERATURE: 97.3 F | SYSTOLIC BLOOD PRESSURE: 107 MMHG | DIASTOLIC BLOOD PRESSURE: 67 MMHG | OXYGEN SATURATION: 97 % | RESPIRATION RATE: 18 BRPM | HEART RATE: 82 BPM

## 2024-08-22 DIAGNOSIS — K59.03 DRUG-INDUCED CONSTIPATION: ICD-10-CM

## 2024-08-22 DIAGNOSIS — Z79.891 ENCOUNTER FOR MONITORING OPIOID MAINTENANCE THERAPY: ICD-10-CM

## 2024-08-22 DIAGNOSIS — C76.0 HEAD AND NECK CANCER (MULTI): ICD-10-CM

## 2024-08-22 DIAGNOSIS — Z51.81 ENCOUNTER FOR MONITORING OPIOID MAINTENANCE THERAPY: ICD-10-CM

## 2024-08-22 DIAGNOSIS — F32.A ANXIETY AND DEPRESSION: ICD-10-CM

## 2024-08-22 DIAGNOSIS — C09.9 MALIGNANT NEOPLASM OF TONSIL (MULTI): ICD-10-CM

## 2024-08-22 DIAGNOSIS — F19.90 ILLICIT DRUG USE: ICD-10-CM

## 2024-08-22 DIAGNOSIS — F15.10 METHAMPHETAMINE ABUSE (MULTI): ICD-10-CM

## 2024-08-22 DIAGNOSIS — G89.3 NEOPLASM RELATED PAIN: ICD-10-CM

## 2024-08-22 DIAGNOSIS — F41.9 ANXIETY AND DEPRESSION: ICD-10-CM

## 2024-08-22 DIAGNOSIS — C76.0 HEAD AND NECK CANCER (MULTI): Primary | ICD-10-CM

## 2024-08-22 DIAGNOSIS — K11.7 XEROSTOMIA: ICD-10-CM

## 2024-08-22 DIAGNOSIS — F31.9 BIPOLAR DEPRESSION (MULTI): ICD-10-CM

## 2024-08-22 DIAGNOSIS — Z51.5 ENCOUNTER FOR PALLIATIVE CARE: Primary | ICD-10-CM

## 2024-08-22 DIAGNOSIS — F14.90 COCAINE USE: ICD-10-CM

## 2024-08-22 DIAGNOSIS — K13.79 MOUTH PAIN: ICD-10-CM

## 2024-08-22 LAB
ALBUMIN SERPL BCP-MCNC: 3.2 G/DL (ref 3.4–5)
ALP SERPL-CCNC: 60 U/L (ref 33–120)
ALT SERPL W P-5'-P-CCNC: 13 U/L (ref 10–52)
AMPHETAMINES UR QL SCN: ABNORMAL
ANION GAP SERPL CALC-SCNC: 13 MMOL/L (ref 10–20)
AST SERPL W P-5'-P-CCNC: 16 U/L (ref 9–39)
BARBITURATES UR QL SCN: ABNORMAL
BASOPHILS # BLD AUTO: 0.01 X10*3/UL (ref 0–0.1)
BASOPHILS NFR BLD AUTO: 0.2 %
BENZODIAZ UR QL SCN: ABNORMAL
BILIRUB SERPL-MCNC: 0.2 MG/DL (ref 0–1.2)
BUN SERPL-MCNC: 12 MG/DL (ref 6–23)
BZE UR QL SCN: ABNORMAL
CALCIUM SERPL-MCNC: 8.2 MG/DL (ref 8.6–10.3)
CANNABINOIDS UR QL SCN: ABNORMAL
CHLORIDE SERPL-SCNC: 103 MMOL/L (ref 98–107)
CO2 SERPL-SCNC: 28 MMOL/L (ref 21–32)
CREAT SERPL-MCNC: 0.99 MG/DL (ref 0.5–1.3)
EGFRCR SERPLBLD CKD-EPI 2021: >90 ML/MIN/1.73M*2
EOSINOPHIL # BLD AUTO: 0.09 X10*3/UL (ref 0–0.7)
EOSINOPHIL NFR BLD AUTO: 1.5 %
ERYTHROCYTE [DISTWIDTH] IN BLOOD BY AUTOMATED COUNT: 12.1 % (ref 11.5–14.5)
ETHANOL ?TM UR-MCNC: <10 MG/DL
FENTANYL+NORFENTANYL UR QL SCN: ABNORMAL
GLUCOSE SERPL-MCNC: 131 MG/DL (ref 74–99)
HCT VFR BLD AUTO: 45.7 % (ref 41–52)
HGB BLD-MCNC: 15.3 G/DL (ref 13.5–17.5)
IMM GRANULOCYTES # BLD AUTO: 0.02 X10*3/UL (ref 0–0.7)
IMM GRANULOCYTES NFR BLD AUTO: 0.3 % (ref 0–0.9)
LYMPHOCYTES # BLD AUTO: 0.72 X10*3/UL (ref 1.2–4.8)
LYMPHOCYTES NFR BLD AUTO: 12.3 %
MCH RBC QN AUTO: 32 PG (ref 26–34)
MCHC RBC AUTO-ENTMCNC: 33.5 G/DL (ref 32–36)
MCV RBC AUTO: 96 FL (ref 80–100)
METHADONE UR QL SCN: ABNORMAL
MONOCYTES # BLD AUTO: 0.45 X10*3/UL (ref 0.1–1)
MONOCYTES NFR BLD AUTO: 7.7 %
NEUTROPHILS # BLD AUTO: 4.54 X10*3/UL (ref 1.2–7.7)
NEUTROPHILS NFR BLD AUTO: 78 %
NRBC BLD-RTO: 0 /100 WBCS (ref 0–0)
OPIATES UR QL SCN: ABNORMAL
OXYCODONE+OXYMORPHONE UR QL SCN: ABNORMAL
PCP UR QL SCN: ABNORMAL
PLATELET # BLD AUTO: 183 X10*3/UL (ref 150–450)
POTASSIUM SERPL-SCNC: 4.6 MMOL/L (ref 3.5–5.3)
PROT SERPL-MCNC: 5.5 G/DL (ref 6.4–8.2)
RBC # BLD AUTO: 4.78 X10*6/UL (ref 4.5–5.9)
SODIUM SERPL-SCNC: 139 MMOL/L (ref 136–145)
WBC # BLD AUTO: 5.8 X10*3/UL (ref 4.4–11.3)

## 2024-08-22 PROCEDURE — 80307 DRUG TEST PRSMV CHEM ANLYZR: CPT

## 2024-08-22 PROCEDURE — 99214 OFFICE O/P EST MOD 30 MIN: CPT | Performed by: NURSE PRACTITIONER

## 2024-08-22 PROCEDURE — 80358 DRUG SCREENING METHADONE: CPT

## 2024-08-22 PROCEDURE — 36415 COLL VENOUS BLD VENIPUNCTURE: CPT

## 2024-08-22 PROCEDURE — 85025 COMPLETE CBC W/AUTO DIFF WBC: CPT

## 2024-08-22 PROCEDURE — 80053 COMPREHEN METABOLIC PANEL: CPT

## 2024-08-22 PROCEDURE — 99215 OFFICE O/P EST HI 40 MIN: CPT | Performed by: INTERNAL MEDICINE

## 2024-08-22 PROCEDURE — 80353 DRUG SCREENING COCAINE: CPT

## 2024-08-22 RX ORDER — METHADONE HYDROCHLORIDE 10 MG/1
20 TABLET ORAL EVERY 8 HOURS
Qty: 180 TABLET | Refills: 0 | Status: SHIPPED | OUTPATIENT
Start: 2024-08-22 | End: 2024-09-21

## 2024-08-22 ASSESSMENT — PAIN SCALES - GENERAL: PAINLEVEL: 6

## 2024-08-22 NOTE — PROGRESS NOTES
Shayne is here with his girlfriend Aura for follow up with Dr Burkitt. He reports that he is having significant mouth pain related to infected teeth and biting down on something hard. He is afraid to go back to the dentist as they will only offer Ibuprofen for his post procedure pain. He will see pall care today. MD aware. Nemo Mejia RN

## 2024-08-22 NOTE — PROGRESS NOTES
Patient ID: Shayne Messer is a 43 y.o. male.    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 Kyunghee Burkitt  Rad/onc: Dr. Valdez  ENT: Dr Jerome  Treatment:  - cisplatin 100mg/m2 q3wk with RT     Oncology history   - 11/11/2022: enlarging right tonsillar mass and RT neck mass x 6 months  - 11/22/2022: triple scope, Enlarged and indurated right tonsil with extension of abnormal appearing tissue onto the soft palate. No extension to BOT and no separate lesions or masses identified; biopsy of right tonsillar mass: invasive p16+ SCC       Past Medical History:   Past Medical History:  No date: Bipolar 1 disorder (Multi)  No date: Immunization not carried out for unspecified reason      Comment:  COVID-19 vaccination not done  No date: Other specified abnormal findings of blood chemistry      Comment:  High serum cholestanol   Surgical History:    No past surgical history on file.   Family History:    No family history on file.  Family Oncology History:    Cancer-related family history is not on file.  Social History:    Social History     Tobacco Use    Smoking status: Every Day    Smokeless tobacco: Current    Tobacco comments:     Vaping.   Vaping Use    Vaping status: Never Used   Substance Use Topics    Alcohol use: Yes     Comment: socially    Drug use: Yes     Types: Marijuana, Cocaine          Subjective   Chief Complaint:     HPI  -He was in ED in June due to paranoia and hallucination secondary to cocaine use.  He sees psych in sept.  -He reports  his mouth pain is slightly better, but pain is still there mostly back of mouth, currently on Methadone  -He sees psych in Sept.  -His wt is stable, he takes 4 boost daily, still can't eat solid food.  -He still has sever dry mouth, hard to chew.    -He also has lots of broken teeth.  He sees dentist on 3/29 for teeth extraction.  -Denies dysphagia, SOB, fever, chills.      PmHx:  Depression with suicidal  ideation in the past requiring hospital admission.  Nothing that he reports in the last 6 months.    Cocaine abuse (snort), currently sober.  Daily smoker.   Self-reported ADHD.       ROS  Review of Systems - Oncology    Allergies  Allergies   Allergen Reactions    Quetiapine Swelling     Swelling of the neck.        Medications  Current Outpatient Medications   Medication Instructions    albuterol 90 mcg/actuation inhaler INHALE 2 PUFFS BY MOUTH EVERY 4 HOURS AS NEEDED. USE WITH SPACER    BMX ORAL SUSPENSION (1:1:1) Swish and swallow 10ml by mouth every 6 hours if needed for mucositis    chlorhexidine (Peridex) 0.12 % solution 15 mL, Mouth/Throat, As needed, DO NOT SWALLOW    chlorproMAZINE (THORAZINE) 200 mg, oral, Nightly    cyclobenzaprine (Flexeril) 10 mg tablet oral    dexAMETHasone (Decadron) 4 mg tablet oral    Flowflex COVID-19 Ag Home Test kit     guaifenesin/pseudoephedrne HCl (PSEUDOEPHEDRINE-GUAIFENESIN ORAL) oral, Every 12 hours    ibuprofen 400 mg, oral, Every 8 hours    lisinopriL-hydrochlorothiazide 20-12.5 mg tablet oral    methadone (DOLOPHINE) 20 mg, oral, Every 8 hours    naloxone (NARCAN) 4 mg, nasal, As needed, May repeat every 2-3 minutes if needed, alternating nostrils, until medical assistance becomes available.    nicotine polacrilex (Commit) 4 mg lozenge DISSOLVE 1 LOZENGE BY MOUTH EVERY 4 HOURS    OMEPRAZOLE ORAL oral    ondansetron (Zofran) 4 mg tablet oral, Every 6 hours PRN    polyethylene glycol (MIRALAX) 17 g, oral, Daily PRN    prochlorperazine (Compazine) 10 mg tablet oral, Every 6 hours PRN    sertraline (ZOLOFT) 50 mg, oral, Daily    sildenafil (Viagra) 100 mg tablet oral    Stimulant Laxative Plus 8.6-50 mg tablet     tadalafil 20 mg tablet 1 tablet, oral, Daily (0630)          Objective   VS: /67   Pulse 82   Temp 36.3 °C (97.3 °F) (Core)   Resp 18   Wt 74.5 kg (164 lb 3.9 oz)   SpO2 97%   BMI 24.25 kg/m²   Weight: Daily Weight  08/22/24 : 74.5 kg (164 lb 3.9  oz)  08/07/24 : 72.6 kg (160 lb)  06/13/24 : 73.5 kg (162 lb)  05/29/24 : 75.1 kg (165 lb 9.1 oz)  05/15/24 : 71.1 kg (156 lb 11.2 oz)  05/15/24 : 73.7 kg (162 lb 8 oz)  03/14/24 : 73.6 kg (162 lb 4.1 oz)      Physical Exam  Constitutional:       Appearance: Normal appearance.   HENT:      Head: Normocephalic and atraumatic.      Mouth/Throat:      Mouth: Mucous membranes are moist.      Comments: Multiple rotten and broken bottom teeth.  Eyes:      Extraocular Movements: Extraocular movements intact.      Pupils: Pupils are equal, round, and reactive to light.   Cardiovascular:      Rate and Rhythm: Normal rate and regular rhythm.   Pulmonary:      Effort: Pulmonary effort is normal.      Breath sounds: Normal breath sounds.   Musculoskeletal:         General: Normal range of motion.      Cervical back: Normal range of motion and neck supple.   Skin:     General: Skin is warm.   Neurological:      General: No focal deficit present.      Mental Status: He is alert and oriented to person, place, and time.         Diagnostic Results   Labs  Below labs are reviewed today.  Results from last 7 days   Lab Units 08/22/24  1322   WBC AUTO x10*3/uL 5.8   HEMOGLOBIN g/dL 15.3   HEMATOCRIT % 45.7   PLATELETS AUTO x10*3/uL 183   NEUTROS ABS x10*3/uL 4.54   LYMPHS ABS AUTO x10*3/uL 0.72*   MONOS ABS AUTO x10*3/uL 0.45   EOS ABS AUTO x10*3/uL 0.09   NEUTROS PCT AUTO % 78.0   LYMPHS PCT AUTO % 12.3   MONOS PCT AUTO % 7.7   EOS PCT AUTO % 1.5      Results from last 7 days   Lab Units 08/22/24  1322   GLUCOSE mg/dL 131*   SODIUM mmol/L 139   POTASSIUM mmol/L 4.6   CHLORIDE mmol/L 103   CO2 mmol/L 28   BUN mg/dL 12   CREATININE mg/dL 0.99   EGFR mL/min/1.73m*2 >90   CALCIUM mg/dL 8.2*   ALBUMIN g/dL 3.2*   PROTEIN TOTAL g/dL 5.5*   BILIRUBIN TOTAL mg/dL 0.2   ALK PHOS U/L 60   ALT U/L 13   AST U/L 16                     Images       Pathology  Lab Results   Component Value Date    [facility]  11/22/2022     Name BABAK GROSS                                                                                                    Accession #: [id number]            Pathologist:                   WADAD S. MNEIMNEH, MD  Date of Procedure:    11/22/2022  Date Received:          11/22/2022  Date Reported           11/28/2022  Submitting Physician:   OLGA KOEHLER MD  Location:                    [facility]  Other External #                                                                    FINAL DIAGNOSIS  A.  RIGHT TONSIL, BIOPSY:    -- INVASIVE P16-POSITIVE SQUAMOUS CELL CARCINOMA, MINIMALLY REPRESENTED (SEE  NOTE).    Note: Minute foci of nonkeratinizing squamous cell carcinoma are noted,  strongly diffusely positive for p16 immunostain.    P16 by immunohistochemistry:  Positive    Reference Range:  Negative:  <50% strong nuclear and cytoplasmic invasive tumor cell staining  Equivocal: 50-70% strong nuclear and cytoplasmic invasive tumor cell staining   Positive: >70% strong nuclear and cytoplasmic invasive tumor cell staining     Findings received by Dr Murphy on Nov 24, 2022, at 6:50 AM    B.  SUPERIOR RIGHT TONSIL, BIOPSY:  -- SQUAMOUS MUCOSA AND ASSOCIATED LYMPHOID TISSUE WITH FOLLICULAR HYPERPLASIA.   -- NO CARCINOMA IDENTIFIED.    Note: Immunostain for p16 performed on block B1 shows focal, patchy, weak  nonspecific staining in the squamous epithelium.       C.  RIGHT TONSIL, BIOPSY # 1:    -- SQUAMOUS MUCOSA AND ASSOCIATED LYMPHOID TISSUE WITH FOLLICULAR HYPERPLASIA  (SEE NOTE).  -- NO CARCINOMA IDENTIFIED.    Note: Multiple levels were examined.    D.  RIGHT TONSIL BIOPSY # 2:    -- SQUAMOUS MUCOSA WITH FOCAL LYMPHOID TISSUE.  -- NO CARCINOMA IDENTIFIED.                                                                                                                                                                                                                                                                                                                "                                                                                                                                                                        Electronically Signed Out By WADAD S. MNEIMNEH, MD/WSMARY  By the signature on this report, the individual or group listed as making the  Final Interpretation/Diagnosis certifies that they have reviewed this case.  Diagnostic interpretation performed at [facility] [id number] [address]. [facility] [id number]         Intraoperative Consultation:  A: RIGHT TONSIL  BIOPSY    Frozen Section 1:  Date Ordered: 11/22/2022 08:59     Date Received: 11/22/2022 08:59     Date  Called: 11/22/2022 09:38    Intraoperative Diagnosis:  A) Right tonsil biopsy (FSA1): Small detached fragment of atypical cells,  suspicious for carcinoma but not diagnostic; requested additional tissue.    : Dr. Mckenzie Cornejo    Intraoperative Consult Pathologist(s):  VENECIA KEYS MD (P)    B: SUPERIOR RIGHT TONSIL    Frozen Section 1:  Date Ordered: 11/22/2022 09:46     Date Received: 11/22/2022 09:46     Date  Called: 11/22/2022 10:02    Intraoperative Diagnosis:  B) Superior right tonsil (FSB1): Benign tonsillar tissue.  Intraoperative Consult Pathologist(s):  VENECIA KEYS MD (P)      [facility]/11/22/2022           Clinical History:  Squamous cell carcinoma    Specimens Submitted As:  A: RIGHT TONSIL  BIOPSY   B: SUPERIOR RIGHT TONSIL   C: RIGHT TONSIL BIOPSY # 1   D: RIGHT TONSIL BIOPSY # 2     Gross Description:  A. Received fresh for intraoperative consultation, labeled with the patient's  name and hospital number and \"right tonsil biopsy\", are multiple tan-red soft  tissue fragments measuring in aggregate 0.8 x 0.8 x 0.2 cm. The specimen is  entirely submitted for intraoperative diagnosis and then for permanent in one  cassette, A1 FSC. RXA/RXH    B. Received fresh for intraoperative consultation, labeled with the patient's  name and hospital number and " "\"superior right tonsil\", are multiple tan-red soft  tissue fragments measuring in aggregate 0.7 x 0.5 x 0.2 cm. The specimen is  entirely submitted for intraoperative diagnosis and then for permanent in one  cassette, B1 FSC. RXA/RXH    C: Received in formalin, labeled with the patient's name and hospital number  and \"right tonsil biopsy #1\", is a tan soft tissue fragment measuring 0.4 x 0.4  x 0.2 cm. The specimen is submitted in toto in one cassette, C1. SBS    D:  Received in formalin, labeled with the patient's name and hospital number  and \"right tonsil biopsy #2\", is a tan soft tissue fragment measuring 0.9 x 0.7  x 0.5 cm. The specimen is submitted in toto in one cassette, D1. SBS      rx[facility]/11/22/2022           The assays/tests were performed with appropriate positive and negative controls  which stained appropriately.    [facility]  Department of Pathology   [address] [place] [address]        PATHREP  09/02/2016     Name BABAK GROSS                                                                                                   Accession #: [id number]            Pathologist:                     Date of Procedure:    9/2/2016  Date Received:          9/6/2016  Date Reported           9/7/2016  Submitting Physician:     REQUESTING PROVIDER: PRITI العلي  Location:                      Other External #                                                                    FINAL DIAGNOSIS  DIAGNOSIS:  (A)  Biopsy of esophagus:     -  Fragments of benign gastroesophageal mucosa with moderate        chronic active inflammation and foci of ulcer debris.     -  Goblet cell metaplasia is not demonstrated.     -  Dysplasia is not seen.    (B)  Biopsy of antrum:    -  Fragments of benign gastric antral/fundal mucosa with features       of chemical (reactive) gastropathy.    -  H. pylori organisms are not demonstrated (Giemsa stain).             -  Focal goblet cell " metaplasia is demonstrated.     -  Please see Comment.    Note  COMMENT:  (A)  Viral nuclear inclusions and hyphal elements are not demonstrated.    (B)  Clinical correlation is requested with origin of the biopsy from the    incisura region where goblet cell metaplasia is a normal finding.                                                                                                                                                                                                                                                                                                                                                                                                                                                                                        By the signature on this report, the individual or group listed as making the  Final Interpretation/Diagnosis certifies that they have reviewed this case.  PROFESSIONAL CODES:  CPT:  88305 x 2, 09669    ICD10:  K92.2, K92.0, K21.0, K29.60    ALEA SMALLS MD    (Electronically signed by)    Verified: 09/07/16    [facility]             Microscopic Description:  MICROSCOPIC DESCRIPTION:  Microscopic slides examined.    [facility]      Clinical History:  SPECIMEN SOURCE:  (A)  Biopsy of esophagus    (B)  Biopsy of antrum    CLINICAL INFORMATION:  Upper GI bleed and hematemesis      Specimens Submitted As:  A: Esophageal biopsy   B: Antral biopsy       Other Related Clinical Data  PROFESSIONAL CODES:  CPT:  88305 x 2, 38065    ICD10:  K92.2, K92.0, K21.0, K29.60    ALEA SMALLS MD    (Electronically signed by)    Verified: 09/07/16    [facility]  Gross Description:  GROSS DESCRIPTION:  (A)  Received in formalin are four fragments of pink-tan mucosa-covered soft    tissue measuring 0.3 x 0.2 x 0.2 cm. in aggregate. The soft tissue is grossly    unremarkable and entirely submitted for routine microscopic evaluation.    (B)  Received in formalin is a single fragment of pink-tan  mucosa-covered soft    tissue measuring 0.2 x 0.2 x 0.2 cm. The soft tissue is grossly unremarkable    and entirely submitted for routine microscopic evaluation.    [facility]  Department of Pathology   [address] [place] [address]             Assessment/Plan   BABAK GROSS is a 42 year old Male with history of polysubstance abuse (snort cocaine, tobacco), recent found locally advanced OPSCC (p16+), started chemo rad on  1/11.     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   - s/p chemoradiation 1/11-2/28/22  - 3/8/23:  labs are stable Na wnl, he still drink and eat ok by mouth, mucositis related pain is tolerable with current meds. Pain med is managed by supp onc, wt loss due to pain.  Pt was over-using both BMX and dex, reinforced patient about appropriate  use of all the meds.  Pain meds are strictly managed by supp onc.  - 6/1/23: PET/CT showed very good response. Near complete interval resolution of hypermetabolic activity  within bilateral cervical lymph node stations. Mild residual josse activity within a left cervical lymph node is indeterminate in nature and residual viable malignancy cannot be excluded. Persistent mouth pain mostly only with eating, some wt loss. Encouraged  pt to continue increase boost intake for extra nutritional support and f/u with dietitian.  I will arrange repeat CT neck with contrast in 3 months to f/u on his residual josse activity.  -6/29/23: swelling in submandibular & bilateral posterior neck tightness/pain, repeat CT neck on 6/18 showed no new lesions, left cervical LN was  the same size as seen in 6/1 PET/CT.  Swelling is likely post treatment related, but advised pt to call us  if swelling or neck tightness/pain worsens.   -9/14/23: ongoing wt issue due to mouth pain, fluid reflux from nostril with drinking. Seen by speech, supp onc and dietitian today. CT neck will be rescheduled to f/u on residual josse activity seen in 6/1/23.  -9/23/23: CT neck showed stable right level 2 lymph node  -11/9/23: ongoing dry mouth with rotten/broken teeth, clear discharge from both ear canal with decreased hearing.  He will see ENT next week and also dentist.  -12/7/23: ongoing dry mouth with rotten/broken teeth, insurance issues related to dental work up, seen by  SW today.   -3/14/24: restaging scan CT n/c from 3/8 showed IRENE.  Wt stable on daily protein drinks, ongoing mouth pain but less, stable stiff neck. No new symptoms.  Sees dentist on 3/29 for teeth extraction  -8/22/24: Wt stable, ongoing mouth pain, sees supp onc, also scheduled to see psych in Sept.  I told patient that I am leaving , he will continue follow up with Rad onc and one of our HN provider.    Plan:  -RTC in 3 months with labs on the same day  -Continue f/u with Rad onc, dietitian and supp onc

## 2024-08-23 ENCOUNTER — HOSPITAL ENCOUNTER (OUTPATIENT)
Dept: RADIATION ONCOLOGY | Facility: HOSPITAL | Age: 44
Setting detail: RADIATION/ONCOLOGY SERIES
Discharge: HOME | End: 2024-08-23
Payer: MEDICAID

## 2024-08-23 NOTE — PROGRESS NOTES
JUNI and Mary Ann JIN met with Shayne and his partner at appointment. Provided print out of dental resources and encouraged him to call and schedule appointment. Will continue to follow.

## 2024-08-26 LAB — BZE UR-MCNC: 543 NG/ML

## 2024-08-27 ENCOUNTER — TELEPHONE (OUTPATIENT)
Dept: HEMATOLOGY/ONCOLOGY | Facility: HOSPITAL | Age: 44
End: 2024-08-27
Payer: MEDICAID

## 2024-08-27 DIAGNOSIS — Z51.5 ENCOUNTER FOR PALLIATIVE CARE: ICD-10-CM

## 2024-08-27 DIAGNOSIS — Z51.81 ENCOUNTER FOR MONITORING OPIOID MAINTENANCE THERAPY: ICD-10-CM

## 2024-08-27 DIAGNOSIS — G89.3 NEOPLASM RELATED PAIN: ICD-10-CM

## 2024-08-27 DIAGNOSIS — Z79.891 ENCOUNTER FOR MONITORING OPIOID MAINTENANCE THERAPY: ICD-10-CM

## 2024-08-27 RX ORDER — METHADONE HYDROCHLORIDE 10 MG/1
20 TABLET ORAL EVERY 8 HOURS
Qty: 144 TABLET | Refills: 0 | Status: SHIPPED | OUTPATIENT
Start: 2024-08-27 | End: 2024-09-20

## 2024-08-27 NOTE — TELEPHONE ENCOUNTER
"OARRS report reviewed and reflects  prescription history, no aberrancy noted. Per OARRS, patient last filled #90 tabs/15 day supply on 8/12/24 so is due for refill of Methadone 10mg. Per last visit with Elissa Small CNP on 8/22/24: \"Continue Methadone 20mg p2wjonl. Discussed great concern with patient that when seen in the ED x2 prior to today's visit, he has had 2 UDS + for cocaine. Discussed with patient if he chooses to continue to use cocaine while being prescribed methadone, Methadone will be weaned down and off. He admits today that his urine will likely be +. Discussed he will need to come back month to give a UDS, and if next sample is again + for cocaine, Methadone will be weaned off.\" Patient with follow up visit scheduled with Elissa on 9/19/24. Per Elissa, prescribe 24 day supply of Methadone to get to that appointment as UDS from 8/22/24 also +cocaine and patient will have to provide urine sample for UDS at that visit and if still inappropriately + cocaine, will wean down on Methadone. Patient updated that medication will be sent to [facility] via VM and reminder about FUV. Refill request routed to provider.   "

## 2024-08-30 ENCOUNTER — APPOINTMENT (OUTPATIENT)
Dept: VASCULAR MEDICINE | Facility: HOSPITAL | Age: 44
End: 2024-08-30
Payer: MEDICAID

## 2024-08-30 LAB
EDDP UR CFM-MCNC: >1000 NG/ML
METHADONE UR CFM-MCNC: >1000 NG/ML

## 2024-09-04 ENCOUNTER — SOCIAL WORK (OUTPATIENT)
Dept: CASE MANAGEMENT | Facility: HOSPITAL | Age: 44
End: 2024-09-04
Payer: MEDICAID

## 2024-09-04 NOTE — PROGRESS NOTES
JUNI set up transportation to 9/4 appointment through [employer]. Scheduled  time is at 12:30. Will continue to follow.

## 2024-09-10 ENCOUNTER — HOSPITAL ENCOUNTER (EMERGENCY)
Facility: HOSPITAL | Age: 44
Discharge: HOME | End: 2024-09-10
Payer: MEDICAID

## 2024-09-10 ENCOUNTER — APPOINTMENT (OUTPATIENT)
Dept: RADIOLOGY | Facility: HOSPITAL | Age: 44
End: 2024-09-10
Payer: MEDICAID

## 2024-09-10 VITALS
HEART RATE: 86 BPM | RESPIRATION RATE: 16 BRPM | WEIGHT: 156.2 LBS | DIASTOLIC BLOOD PRESSURE: 79 MMHG | TEMPERATURE: 98.6 F | OXYGEN SATURATION: 99 % | HEIGHT: 69 IN | SYSTOLIC BLOOD PRESSURE: 131 MMHG | BODY MASS INDEX: 23.13 KG/M2

## 2024-09-10 DIAGNOSIS — L03.211 FACIAL CELLULITIS: Primary | ICD-10-CM

## 2024-09-10 DIAGNOSIS — L03.213 PRESEPTAL CELLULITIS OF LEFT LOWER EYELID: ICD-10-CM

## 2024-09-10 DIAGNOSIS — R51.9 LEFT FACIAL PAIN: ICD-10-CM

## 2024-09-10 LAB
ALBUMIN SERPL-MCNC: 3.4 G/DL (ref 3.5–5)
ALP BLD-CCNC: 69 U/L (ref 35–125)
ALT SERPL-CCNC: 8 U/L (ref 5–40)
ANION GAP SERPL CALC-SCNC: 10 MMOL/L
AST SERPL-CCNC: 16 U/L (ref 5–40)
BASOPHILS # BLD AUTO: 0.02 X10*3/UL (ref 0–0.1)
BASOPHILS NFR BLD AUTO: 0.3 %
BILIRUB SERPL-MCNC: 0.3 MG/DL (ref 0.1–1.2)
BUN SERPL-MCNC: 8 MG/DL (ref 8–25)
CALCIUM SERPL-MCNC: 8.8 MG/DL (ref 8.5–10.4)
CHLORIDE SERPL-SCNC: 100 MMOL/L (ref 97–107)
CO2 SERPL-SCNC: 26 MMOL/L (ref 24–31)
CREAT SERPL-MCNC: 0.8 MG/DL (ref 0.4–1.6)
CRP SERPL-MCNC: 7.3 MG/DL (ref 0–2)
EGFRCR SERPLBLD CKD-EPI 2021: >90 ML/MIN/1.73M*2
EOSINOPHIL # BLD AUTO: 0.14 X10*3/UL (ref 0–0.7)
EOSINOPHIL NFR BLD AUTO: 1.9 %
ERYTHROCYTE [DISTWIDTH] IN BLOOD BY AUTOMATED COUNT: 12.3 % (ref 11.5–14.5)
ERYTHROCYTE [SEDIMENTATION RATE] IN BLOOD BY WESTERGREN METHOD: 34 MM/H (ref 0–15)
GLUCOSE SERPL-MCNC: 128 MG/DL (ref 65–99)
HCT VFR BLD AUTO: 39 % (ref 41–52)
HGB BLD-MCNC: 13 G/DL (ref 13.5–17.5)
IMM GRANULOCYTES # BLD AUTO: 0.02 X10*3/UL (ref 0–0.7)
IMM GRANULOCYTES NFR BLD AUTO: 0.3 % (ref 0–0.9)
LACTATE BLDV-SCNC: 1.7 MMOL/L (ref 0.4–2)
LYMPHOCYTES # BLD AUTO: 0.49 X10*3/UL (ref 1.2–4.8)
LYMPHOCYTES NFR BLD AUTO: 6.5 %
MCH RBC QN AUTO: 32.1 PG (ref 26–34)
MCHC RBC AUTO-ENTMCNC: 33.3 G/DL (ref 32–36)
MCV RBC AUTO: 96 FL (ref 80–100)
MONOCYTES # BLD AUTO: 0.47 X10*3/UL (ref 0.1–1)
MONOCYTES NFR BLD AUTO: 6.3 %
NEUTROPHILS # BLD AUTO: 6.35 X10*3/UL (ref 1.2–7.7)
NEUTROPHILS NFR BLD AUTO: 84.7 %
NRBC BLD-RTO: 0 /100 WBCS (ref 0–0)
PLATELET # BLD AUTO: 200 X10*3/UL (ref 150–450)
POTASSIUM SERPL-SCNC: 4.6 MMOL/L (ref 3.4–5.1)
PROT SERPL-MCNC: 6.1 G/DL (ref 5.9–7.9)
RBC # BLD AUTO: 4.05 X10*6/UL (ref 4.5–5.9)
SODIUM SERPL-SCNC: 136 MMOL/L (ref 133–145)
WBC # BLD AUTO: 7.5 X10*3/UL (ref 4.4–11.3)

## 2024-09-10 PROCEDURE — 36415 COLL VENOUS BLD VENIPUNCTURE: CPT

## 2024-09-10 PROCEDURE — 2500000001 HC RX 250 WO HCPCS SELF ADMINISTERED DRUGS (ALT 637 FOR MEDICARE OP): Performed by: STUDENT IN AN ORGANIZED HEALTH CARE EDUCATION/TRAINING PROGRAM

## 2024-09-10 PROCEDURE — 87040 BLOOD CULTURE FOR BACTERIA: CPT | Mod: WESLAB

## 2024-09-10 PROCEDURE — 70481 CT ORBIT/EAR/FOSSA W/DYE: CPT | Performed by: RADIOLOGY

## 2024-09-10 PROCEDURE — 2550000001 HC RX 255 CONTRASTS

## 2024-09-10 PROCEDURE — 85025 COMPLETE CBC W/AUTO DIFF WBC: CPT

## 2024-09-10 PROCEDURE — 96360 HYDRATION IV INFUSION INIT: CPT | Mod: 59

## 2024-09-10 PROCEDURE — 70481 CT ORBIT/EAR/FOSSA W/DYE: CPT

## 2024-09-10 PROCEDURE — 80053 COMPREHEN METABOLIC PANEL: CPT

## 2024-09-10 PROCEDURE — 2500000004 HC RX 250 GENERAL PHARMACY W/ HCPCS (ALT 636 FOR OP/ED)

## 2024-09-10 PROCEDURE — 96372 THER/PROPH/DIAG INJ SC/IM: CPT | Performed by: STUDENT IN AN ORGANIZED HEALTH CARE EDUCATION/TRAINING PROGRAM

## 2024-09-10 PROCEDURE — 2500000004 HC RX 250 GENERAL PHARMACY W/ HCPCS (ALT 636 FOR OP/ED): Performed by: STUDENT IN AN ORGANIZED HEALTH CARE EDUCATION/TRAINING PROGRAM

## 2024-09-10 PROCEDURE — 99284 EMERGENCY DEPT VISIT MOD MDM: CPT | Mod: 25

## 2024-09-10 PROCEDURE — 87075 CULTR BACTERIA EXCEPT BLOOD: CPT | Mod: WESLAB,59

## 2024-09-10 PROCEDURE — 83605 ASSAY OF LACTIC ACID: CPT

## 2024-09-10 PROCEDURE — 85652 RBC SED RATE AUTOMATED: CPT

## 2024-09-10 PROCEDURE — 86140 C-REACTIVE PROTEIN: CPT

## 2024-09-10 RX ORDER — CEPHALEXIN 500 MG/1
500 CAPSULE ORAL 4 TIMES DAILY
Qty: 40 CAPSULE | Refills: 0 | Status: SHIPPED | OUTPATIENT
Start: 2024-09-10 | End: 2024-09-10

## 2024-09-10 RX ORDER — CEPHALEXIN 500 MG/1
500 CAPSULE ORAL ONCE
Status: COMPLETED | OUTPATIENT
Start: 2024-09-10 | End: 2024-09-10

## 2024-09-10 RX ORDER — IBUPROFEN 600 MG/1
600 TABLET ORAL EVERY 6 HOURS PRN
Qty: 30 TABLET | Refills: 0 | Status: SHIPPED | OUTPATIENT
Start: 2024-09-10 | End: 2024-10-10

## 2024-09-10 RX ORDER — ACETAMINOPHEN 500 MG
1000 TABLET ORAL EVERY 6 HOURS PRN
Qty: 30 TABLET | Refills: 0 | Status: SHIPPED | OUTPATIENT
Start: 2024-09-10 | End: 2024-09-10

## 2024-09-10 RX ORDER — ACETAMINOPHEN 500 MG
1000 TABLET ORAL ONCE
Status: COMPLETED | OUTPATIENT
Start: 2024-09-10 | End: 2024-09-10

## 2024-09-10 RX ORDER — IBUPROFEN 600 MG/1
600 TABLET ORAL EVERY 6 HOURS PRN
Qty: 30 TABLET | Refills: 0 | Status: SHIPPED | OUTPATIENT
Start: 2024-09-10 | End: 2024-09-10

## 2024-09-10 RX ORDER — CEPHALEXIN 500 MG/1
500 CAPSULE ORAL 4 TIMES DAILY
Qty: 40 CAPSULE | Refills: 0 | Status: SHIPPED | OUTPATIENT
Start: 2024-09-10 | End: 2024-09-20

## 2024-09-10 RX ORDER — ACETAMINOPHEN 500 MG
1000 TABLET ORAL EVERY 6 HOURS PRN
Qty: 30 TABLET | Refills: 0 | Status: SHIPPED | OUTPATIENT
Start: 2024-09-10 | End: 2024-10-10

## 2024-09-10 RX ORDER — KETOROLAC TROMETHAMINE 30 MG/ML
30 INJECTION, SOLUTION INTRAMUSCULAR; INTRAVENOUS ONCE
Status: COMPLETED | OUTPATIENT
Start: 2024-09-10 | End: 2024-09-10

## 2024-09-10 ASSESSMENT — PAIN - FUNCTIONAL ASSESSMENT: PAIN_FUNCTIONAL_ASSESSMENT: 0-10

## 2024-09-10 ASSESSMENT — COLUMBIA-SUICIDE SEVERITY RATING SCALE - C-SSRS
1. IN THE PAST MONTH, HAVE YOU WISHED YOU WERE DEAD OR WISHED YOU COULD GO TO SLEEP AND NOT WAKE UP?: NO
6. HAVE YOU EVER DONE ANYTHING, STARTED TO DO ANYTHING, OR PREPARED TO DO ANYTHING TO END YOUR LIFE?: NO
2. HAVE YOU ACTUALLY HAD ANY THOUGHTS OF KILLING YOURSELF?: NO

## 2024-09-10 ASSESSMENT — PAIN DESCRIPTION - ORIENTATION: ORIENTATION: LEFT

## 2024-09-10 ASSESSMENT — PAIN DESCRIPTION - LOCATION: LOCATION: FACE

## 2024-09-10 ASSESSMENT — PAIN DESCRIPTION - PAIN TYPE: TYPE: ACUTE PAIN

## 2024-09-10 ASSESSMENT — PAIN SCALES - GENERAL: PAINLEVEL_OUTOF10: 9

## 2024-09-10 NOTE — TREATMENT PLAN
"Patient signed out to me by off going provider, Dr. Chicho Morocho, at 4:58 PM in stable condition.    IN BRIEF:  43 y.o.male // pmhx oral carcinoma 2/t HPV hx // p/w facial pain and ocular pain  -  concerning of periorbital versus orbital cellulitis, already on augmentin     /79   Pulse 86   Temp 37 °C (98.6 °F) (Temporal)   Resp 16   Ht 1.753 m (5' 9\")   Wt 70.9 kg (156 lb 3.2 oz)   SpO2 99%   BMI 23.07 kg/m²     Diagnoses as of 09/14/24 1658   Facial cellulitis   Preseptal cellulitis of left lower eyelid   Left facial pain       Remaining work up:  Images CT Orbits w/CON and Reassessment    Likely disposition Discharge Back to [facility] with alternative NONE.    Physical Exam  Vitals and nursing note reviewed.   Constitutional:       Appearance: Normal appearance. He is normal weight.   HENT:      Mouth/Throat:      Mouth: Mucous membranes are moist.   Eyes:      Pupils: Pupils are equal, round, and reactive to light.   Cardiovascular:      Rate and Rhythm: Normal rate and regular rhythm.      Pulses: Normal pulses.   Pulmonary:      Effort: Pulmonary effort is normal.      Breath sounds: Normal breath sounds.   Skin:     General: Skin is warm and dry.   Neurological:      General: No focal deficit present.      Mental Status: He is alert and oriented to person, place, and time.         TRANSITION of CARE INTERVAL:  Reassessment patient still endorses moderate pain of the left face with swelling.  Denies any visual changes.  Not endorsing any new or sniffily worsening symptoms.  I review of imaging demonstrating preseptal cutaneous infection/inflammatory findings consistent with cellulitis.  No evidence of retro-orbital hematoma or orbital cellulitis.  Discussed findings with patient and provided instruction for follow-up and RTED precautions.  Change antibiotic regiment to Bactrim given poor response to Augmentin.  Patient appropriate for discharge back to [facility] and agreeable to plan of care.      FINAL " IMPRESSION:  1. Facial cellulitis  cephalexin (Keflex) 500 mg capsule    DISCONTINUED: cephalexin (Keflex) 500 mg capsule      2. Preseptal cellulitis of left lower eyelid  cephalexin (Keflex) 500 mg capsule    DISCONTINUED: cephalexin (Keflex) 500 mg capsule      3. Left facial pain  acetaminophen (Tylenol) 500 mg tablet    ibuprofen 600 mg tablet    DISCONTINUED: ibuprofen 600 mg tablet    DISCONTINUED: acetaminophen (Tylenol) 500 mg tablet            FINAL DISPOSITION:  Discharge back to [facility]

## 2024-09-10 NOTE — ED PROVIDER NOTES
HPI   No chief complaint on file.      Patient is a 43-year-old male who presents with a chief complaint of left facial abscess.  Patient states he started noticing that his face was swollen yesterday, within 1 day blew up to a large size.  Patient states he has a history of mouth cancer, and he has bad teeth from all of this.  Patient is on antibiotic right now, and he feels like he does not be better, and is now spread to his eye.  Patient states that hurts to turn his eye, patient denies any fevers or chills, denies chest pain shortness of breath, dizziness or blurred vision.      History provided by:  Patient          Patient History   Past Medical History:   Diagnosis Date    Bipolar 1 disorder (Multi)     Immunization not carried out for unspecified reason     COVID-19 vaccination not done    Other specified abnormal findings of blood chemistry     High serum cholestanol     No past surgical history on file.  No family history on file.  Social History     Tobacco Use    Smoking status: Every Day    Smokeless tobacco: Current    Tobacco comments:     Vaping.   Vaping Use    Vaping status: Never Used   Substance Use Topics    Alcohol use: Yes     Comment: socially    Drug use: Yes     Types: Marijuana, Cocaine       Physical Exam   ED Triage Vitals   Temp Pulse Resp BP   -- -- -- --      SpO2 Temp src Heart Rate Source Patient Position   -- -- -- --      BP Location FiO2 (%)     -- --       Physical Exam  Vitals and nursing note reviewed. Exam conducted with a chaperone present.   Constitutional:       General: He is not in acute distress.     Appearance: Normal appearance. He is normal weight. He is not ill-appearing, toxic-appearing or diaphoretic.   HENT:      Head: Normocephalic.      Nose: Nose normal.      Mouth/Throat:      Mouth: Mucous membranes are moist.      Pharynx: Oropharynx is clear.   Eyes:      Extraocular Movements: Extraocular movements intact.      Conjunctiva/sclera: Conjunctivae normal.       Pupils: Pupils are equal, round, and reactive to light.   Cardiovascular:      Rate and Rhythm: Normal rate and regular rhythm.      Pulses: Normal pulses.      Heart sounds: Normal heart sounds.   Pulmonary:      Effort: Pulmonary effort is normal.      Breath sounds: Normal breath sounds.   Abdominal:      General: Abdomen is flat. Bowel sounds are normal.      Palpations: Abdomen is soft.   Musculoskeletal:      Cervical back: Normal range of motion and neck supple.   Skin:     General: Skin is warm and dry.      Capillary Refill: Capillary refill takes less than 2 seconds.   Neurological:      General: No focal deficit present.      Mental Status: He is alert and oriented to person, place, and time. Mental status is at baseline.   Psychiatric:         Mood and Affect: Mood normal.         Behavior: Behavior normal.         Thought Content: Thought content normal.         Judgment: Judgment normal.           ED Course & MDM   Diagnoses as of 09/11/24 0129   Facial cellulitis   Preseptal cellulitis of left lower eyelid   Left facial pain                 No data recorded                                 Medical Decision Making  Patient seen and evaluated at bedside, patient is in no acute distress.  I will order a CT facial bones with contrast, CBC, CMP, CRP, sedimentation rate, lactic acid, normal saline. Differential diagnosis includes but is not limited to periorbital cellulitis, orbital cellulitis, Ed angina.  Patient CBC shows hemoglobin 13.0, medic at 39.0, CRP 7.3, sedimentation rate 34, patient's lactic acid 1.7, patient's CT dated pending at time of my shift ending, patient be signed out to oncoming clinician.        Procedure  Procedures    Sections of this report were created using voice-to-text technology and may contain errors in translation    Chicho Morocho DO  Emergency Medicine         Chicho Morocho DO  09/11/24 0130

## 2024-09-10 NOTE — DISCHARGE INSTRUCTIONS
You were evaluated for left-sided facial pain and swelling.     During your visit in the emergency department you were evaluated for your presenting symptoms.  Based on the extensive workup you received,  all efforts were made to identify the source of your symptoms and identify any life-threatening conditions.  These life-threatening conditions that were attempted to be identified and medically managed/treated include but not limited to orbital cellulitis.  Additionally, you may be experiencing symptoms that are non-life-threatening but are uncomfortable and disruptive to your everyday life.  All efforts were made to manage your symptoms while in the emergency department along with appropriate at home therapy for symptomatic management during your recovery. Please be aware that not all of the conditions explained/discussed in these discharge instructions are applicable to your condition but encompass many of the conditions that were evaluated for during your ED encounter.      During your evaluation and assessment, all measures were taken to evaluate you and address your health concerns to identify dangerous and life threatening health conditions. It is not possible to identify all health conditions or pathologies and eliminate any chance of unfavorable outcomes while in the Emergency Department. My team encourages you to be vigilant with your health and follow-up with the appropriate providers outlined in your discharge paperwork. Please return to the Emergency Department if you feel that your health has not improved or experiencing worsening symptoms.    Special instructions please take the medications prescribed.  Please make a follow-up ointment your primary care physician to further manage your symptoms and address your health concerns.    Incidental findings:  None

## 2024-09-14 LAB
BACTERIA BLD CULT: NORMAL
BACTERIA BLD CULT: NORMAL

## 2024-09-16 NOTE — PROGRESS NOTES
SUPPORTIVE AND PALLIATIVE ONCOLOGY OUTPATIENT FOLLOW-UP      SERVICE DATE: 9/19/2024    Subjective   HISTORY OF PRESENT ILLNESS: Shayne Messer is a 43 y.o. male who presents with depression with suicidal attempts, bipolar disorder, tobacco abuse, illicit drug abuse, and diagnosis of Tonsillar Squamous Cell Carcinoma. Patient is following with Dr. Burkitt as primary oncologist. Patient has been referred to Supportive Oncology/Palliative Care for neoplasm related pain and further symptom management.       Since last visit, patient went to the ED 9/6/2024 with concerns of mood disorder/feeling depressed, and reporting he ran out of his Methadone and could not make it to the pharmacy to get his medications. He also admitted at that time to doing cocaine. Patient was pink slipped to [facility] for inpatient psych. Patient again went to the ED 9/10/24 with concerns of left facial abscess.    Pain Assessment:  Pain Score:    Location:    Description:      Symptom Assessment:  Pain:{ :95746}  Numbness or Tingling in hands/feet/other: { :24362}  Sore Muscles/Spasms: { :91475}  Headache: {  :36393}  Dizziness:{ :90690}  Constipation: { :45119}  Diarrhea: { :98059}  Nausea: { :64567}  Vomiting: { :11599}  Lack of Appetite: {  :21501}   Weight Loss: { :46928}  Taste changes: { :51200}  Dry Mouth: { :12518}  Pain in Mouth/Swallowing: { :77846}  Lack of Energy: { :25429}  Difficulty Sleeping: { :50935}  Worrying: {  :43135}  Anxiety: { :29682}  Depression: { :22429}  Shortness of breath: { :39018}  Other: { :85956}      Information obtained from: { :62740}  ______________________________________________________________________        Objective      Latest Reference Range & Units 08/22/24 13:40   Methadone <25 ng/mL >1,000 (H)   Amphetamine Screen, Urine Presumptive Negative  Presumptive Negative   Barbiturate Screen, Urine Presumptive Negative  Presumptive Negative   PCP Screen, Urine Presumptive Negative  Presumptive  Negative   Opiate Screen, Urine Presumptive Negative  Presumptive Negative   Alcohol, Urine <20 mg/dL <10   Cannabinoid Screen, Urine Presumptive Negative  Presumptive Negative   Fentanyl Screen, Urine Presumptive Negative  Presumptive Negative   Oxycodone Screen, Urine Presumptive Negative  Presumptive Negative   Benzodiazepines Screen, Urine Presumptive Negative  Presumptive Negative   Methadone Screen, Urine Presumptive Negative  Presumptive Positive !   Cocaine Metabolite Screen, Urine Presumptive Negative  Presumptive Positive !   (H): Data is abnormally high  !: Data is abnormal     PHYSICAL EXAMINATION   Vital Signs:   Vital signs reviewed  There were no vitals filed for this visit.  Pain Score:        Physical Exam    ASSESSMENT/PLAN    Pain  Pain is: chronic after cancer treatment  Type: somatic and neuropathic  Pain control: sub-optimally controlled  Home regimen:   - Continue Acetaminophen 1000mg [id number] PRN  - Methadone 20mg [id number]. Discussed with patient if he is with continued cocaine use, Methadone will be weaned as patient does not have active cancer at this time. Per chart, patient ran out of medication after last visit upon arrival to ED 9/6/2024  - EKG: (6/15/23) QTc 455. (12/7/23) Qtc 447. (5/15/2024) Qtc 433. Repeat with Methadone increase, or once yearly  - Continue BMX 10mg QID PRN  - Start Oralgel 10% 4x daily PRN for tooth pain  - Viscous Lidocaine 2% - declines - caused patient to vomit due to taste  - Morphine - patient went to detox and taken off medication  - 4/20/23: Patient admitted to doing Meth, no further opioids at this time  - Referral to [facility] (9/14/2023) - never scheduled  - Going [facility] [employer] for dental work - planning for full teeth extraction      Opioid Use  Medication Management:   - OARRS report reviewed with no aberrant behavior; consistent with  prescriptions/records and patient history  - .  Overdose Risk Score 540. This has  been discussed with patient.   - We will continue to closely monitor the patient for signs of prescription misuse including UDS, OARRS review and subjective reports at each visit.  - No concurrent benzodiazepine use   - I am a provider who either is or has consulted and collaborated with a provider certified in Hospice and Palliative Medicine and have conducted a face-face visit and examination for this patient.  - Routine Urine Drug Screen: 1/11/23 appropriately negative for illicit substances. 4/20/23, 6/29/23, 11/9/23 appropriately + for prescribed medications and  - for illicits. 5/15/2024 + for cocaine. 6/13/24 + cocaine. 8/22/2024 + for cocaine. Pending results 9/16/2024  - Controlled Substance Agreement: 5/15/2024  - Specifically discussed that controlled substance prescriptions will only be provided by our group as outlined in the completed agreement  - Prescribed naloxone: declined 1/11/23  - Red Flags: hx of cocaine abuse; hx of suicidal attempts     Constipation  At risk for constipation related to opioids.  Usual bowel pattern: daily  Home regimen:   - Continue Senna 2 tabs 1-2x per day PRN  - Continue Miralax 17grams 1-2x per day PRN     Decreased Appetite  Related to  pain/treatment hx  Nutrition following - Lora Sanderson RDN  Home regimen:    - Continue to monitor  - Continue protein supplements     Xerostomia  - Continue Biotene Rinses 15mL QID PRN  - Continue Pilocarpine 5mg TID PRN     Supportive and Palliative Oncology Encounter:  Emotional support provided  Coordination of care  Will continue to follow and address symptoms as needed     Advance Directives  Code Status: Full code    Next Follow-Up Visit:  Return to clinic in ***    Signature and billing  Medical complexity was { :05172} level due to due to complexity of problems, extensive data review, and high risk of management/treatment.  Time was spent on the following: { :08659}. Total time spent: ***      Data  Diagnostic tests and  information reviewed for today's visit:  { :21087}     Some elements copied from Palliative Care note on 8/22/2024, the elements have been updated and all reflect current decision making from today, 9/19/2024.      Plan of Care discussed with: { :88424}    SIGNATURE: SINDY Yanes-CNP    Contact information:  Supportive and Palliative Oncology  Monday-Friday 8 AM-5 PM  Phone:  [phone], press option #5, then option #1.   Or Epic Secure Chat

## 2024-09-17 ENCOUNTER — PHARMACY VISIT (OUTPATIENT)
Dept: PHARMACY | Facility: CLINIC | Age: 44
End: 2024-09-17
Payer: MEDICAID

## 2024-09-17 ENCOUNTER — NUTRITION (OUTPATIENT)
Dept: HEMATOLOGY/ONCOLOGY | Facility: HOSPITAL | Age: 44
End: 2024-09-17
Payer: MEDICAID

## 2024-09-17 ENCOUNTER — SOCIAL WORK (OUTPATIENT)
Dept: CASE MANAGEMENT | Facility: HOSPITAL | Age: 44
End: 2024-09-17
Payer: MEDICAID

## 2024-09-17 PROCEDURE — RXMED WILLOW AMBULATORY MEDICATION CHARGE

## 2024-09-17 RX ORDER — MIRTAZAPINE 7.5 MG/1
7.5 TABLET, FILM COATED ORAL NIGHTLY
Qty: 30 TABLET | Refills: 0 | OUTPATIENT
Start: 2024-09-17

## 2024-09-17 NOTE — PROGRESS NOTES
Shayne called JUNI and shared that he just left the ED and is feeling better and is relieved. He did share that he is having concerns about his mouth abscess again. Shayne was given free/reduced option for dental care. He also asked if he could have assistance with transportation to his upcoming appointments. [facility]UNI coordinated Roundtrip to pick him up at 12:50 for his 1:00 appointment. Will continue to follow.

## 2024-09-17 NOTE — PROGRESS NOTES
NUTRITION COMMUNICATION NOTE    Shayne Messer     REASON FOR COMMUNICATION:     Pt called me today  Pt reports never received his monthly supplies from [employer]  Pt reports just left ED with an abscess  Noted he was there on 9-- he had a left facial abscess   He is in need of this product    Donna / [facility] called  Waiting for a new CMN  Boost Plus 1800 calories per day    Reached out to RD who had most recently been following pt who is working remotely today regarding this - he will follow - pt is called so he is aware

## 2024-09-19 ENCOUNTER — APPOINTMENT (OUTPATIENT)
Dept: PALLIATIVE MEDICINE | Facility: HOSPITAL | Age: 44
End: 2024-09-19
Payer: MEDICAID

## 2024-09-19 ENCOUNTER — APPOINTMENT (OUTPATIENT)
Dept: RADIATION ONCOLOGY | Facility: HOSPITAL | Age: 44
End: 2024-09-19
Payer: MEDICAID

## 2024-09-19 DIAGNOSIS — Z79.891 ENCOUNTER FOR MONITORING OPIOID MAINTENANCE THERAPY: Primary | ICD-10-CM

## 2024-09-19 DIAGNOSIS — Z51.81 ENCOUNTER FOR MONITORING OPIOID MAINTENANCE THERAPY: Primary | ICD-10-CM

## 2024-09-23 NOTE — PROGRESS NOTES
SUPPORTIVE AND PALLIATIVE ONCOLOGY OUTPATIENT FOLLOW-UP      SERVICE DATE: 9/25/2024    Subjective   HISTORY OF PRESENT ILLNESS: Shayne Messer is a 43 y.o. male who presents with depression with suicidal attempts, bipolar disorder, tobacco abuse, illicit drug abuse, and diagnosis of Tonsillar Squamous Cell Carcinoma. Patient is following with Dr. Burkitt as primary oncologist. Patient has been referred to Supportive Oncology/Palliative Care for neoplasm related pain and further symptom management.       Since last visit, patient went to the ED 9/6/2024 with concerns of mood disorder/feeling depressed, and reporting he ran out of his Methadone and could not make it to the pharmacy to get his medications. He also admitted at that time to doing cocaine. Patient was pink slipped to [facility] for inpatient psych. Patient again went to the ED 9/10/24 with concerns of left facial abscess.    Pain Assessment:  Pain Score:  7  Location:  teeth/mouth  Description:      Symptom Assessment:  Pain:somewhat - patient continues to have tooth pain. He has not gone back to the dentist to get the rest of his teeth pulled. He is upset that [facility] is not giving him anything more than Ibuprofen for his pain when they pull his teeth. Discussed with patient the concern of drug use and that no department or dentist with further prescribe him any pain medications if he continues to use illicit drugs. Discussed the many concerns of his continued use of cocaine. Discussed he does not have active cancer at this time, and with continued use of drugs, this department/provider can not longer prescribe him Methadone. He has tested + for cocaine with each UDS received since May. Methadone will be weaned down every 2 weeks at this point until he is off and patient will be referred to addiction medicine.   Numbness or Tingling in hands/feet/other: none  Sore Muscles/Spasms: none  Headache: none  Dizziness:none  Constipation:  "none  Diarrhea: none  Nausea: none  Vomiting: none  Lack of Appetite: none   Weight Loss: none  Taste changes: none  Dry Mouth: none  Pain in Mouth/Swallowing: none  Lack of Energy: none  Difficulty Sleeping: none  Worrying: none  Anxiety: none  Depression: none  Shortness of breath: none  Other: none      Information obtained from: chart review and interview of patient  ______________________________________________________________________        Objective      Latest Reference Range & Units 08/22/24 13:40   Methadone <25 ng/mL >1,000 (H)   Amphetamine Screen, Urine Presumptive Negative  Presumptive Negative   Barbiturate Screen, Urine Presumptive Negative  Presumptive Negative   PCP Screen, Urine Presumptive Negative  Presumptive Negative   Opiate Screen, Urine Presumptive Negative  Presumptive Negative   Alcohol, Urine <20 mg/dL <10   Cannabinoid Screen, Urine Presumptive Negative  Presumptive Negative   Fentanyl Screen, Urine Presumptive Negative  Presumptive Negative   Oxycodone Screen, Urine Presumptive Negative  Presumptive Negative   Benzodiazepines Screen, Urine Presumptive Negative  Presumptive Negative   Methadone Screen, Urine Presumptive Negative  Presumptive Positive !   Cocaine Metabolite Screen, Urine Presumptive Negative  Presumptive Positive !   (H): Data is abnormally high  !: Data is abnormal     PHYSICAL EXAMINATION   Vital Signs:   Vital signs reviewed      6/14/2024     8:58 AM 6/14/2024    10:45 AM 8/7/2024     3:29 PM 8/22/2024     1:26 PM 9/10/2024     4:14 AM 9/10/2024     1:03 PM 9/25/2024     2:09 PM   Vitals   Systolic 118 114 121 107 138 131 84   Diastolic 76 77 72 67 89 79 48   Heart Rate 54 62 69 82 94 86 86   Temp   35.8 °C (96.4 °F) 36.3 °C (97.3 °F) 37 °C (98.6 °F)  36.2 °C (97.2 °F)   Resp 16 16 16 18 16 16 18   Height (in)     1.753 m (5' 9\")     Weight (lb)   160 164.24 156.2     BMI   23.63 kg/m2 24.25 kg/m2 23.07 kg/m2     BSA (m2)   1.88 m2 1.9 m2 1.86 m2     Visit Report   " Report Report    Report   Report    Report     Physical Exam  Vitals reviewed.   Constitutional:       Appearance: Normal appearance.   HENT:      Head: Normocephalic.   Cardiovascular:      Rate and Rhythm: Normal rate and regular rhythm.   Pulmonary:      Effort: Pulmonary effort is normal.   Abdominal:      General: Abdomen is flat. Bowel sounds are normal.      Palpations: Abdomen is soft.   Musculoskeletal:         General: Normal range of motion.   Skin:     General: Skin is warm and dry.   Neurological:      General: No focal deficit present.      Mental Status: He is alert and oriented to person, place, and time. Mental status is at baseline.   Psychiatric:         Mood and Affect: Mood normal.         Behavior: Behavior normal.         Thought Content: Thought content normal.         Judgment: Judgment normal.       ASSESSMENT/PLAN    Pain  Pain is: chronic after cancer treatment  Type: somatic and neuropathic  Pain control: sub-optimally controlled  Home regimen:   - Continue Acetaminophen 1000mg [id number] PRN  - Wean to Methadone 15mg [id number]. Discussed with patient with his continued cocaine use, Methadone will be weaned as patient does not have active cancer at this time and continues to use illicit drugs. Methadone dose will be weaned every 2 weeks  - EKG: (6/15/23) QTc 455. (12/7/23) Qtc 447. (5/15/2024) Qtc 433. Repeat with Methadone increase, or once yearly  - Referral to Addiction Medicine - 9/25/2024  - Continue BMX 10mg QID PRN  - Start Oralgel 10% 4x daily PRN for tooth pain  - Viscous Lidocaine 2% - declines - caused patient to vomit due to taste  - Morphine - patient went to detox and taken off medication  - 4/20/23: Patient admitted to doing Meth, no further opioids at this time  - Referral to [facility] (9/14/2023) - never scheduled  - Going to [employer] for dental work - planning for full teeth extraction      Opioid Use  Medication Management:   - OARRS report  reviewed with no aberrant behavior; consistent with  prescriptions/records and patient history  - .  Overdose Risk Score 540. This has been discussed with patient.   - We will continue to closely monitor the patient for signs of prescription misuse including UDS, OARRS review and subjective reports at each visit.  - No concurrent benzodiazepine use   - I am a provider who either is or has consulted and collaborated with a provider certified in Hospice and Palliative Medicine and have conducted a face-face visit and examination for this patient.  - Routine Urine Drug Screen: 1/11/23 appropriately negative for illicit substances. 4/20/23, 6/29/23, 11/9/23 appropriately + for prescribed medications and  - for illicits. 5/15/2024 + for cocaine. 6/13/24 + cocaine. 8/22/2024 + for cocaine. Pending results 9/25/2024  - Controlled Substance Agreement: 5/15/2024  - Specifically discussed that controlled substance prescriptions will only be provided by our group as outlined in the completed agreement  - Prescribed naloxone: declined 1/11/23  - Red Flags: hx of cocaine abuse; hx of suicidal attempts     Constipation  At risk for constipation related to opioids.  Usual bowel pattern: daily  Home regimen:   - Continue Senna 2 tabs 1-2x per day PRN  - Continue Miralax 17grams 1-2x per day PRN     Decreased Appetite  Related to  pain/treatment hx  Nutrition following - Amy Lejeune, RDN  Home regimen:    - Continue to monitor  - Continue protein supplements     Xerostomia  - Continue Biotene Rinses 15mL QID PRN  - Continue Pilocarpine 5mg TID PRN    Interdisciplinary  - 9/25/2024: reached out to  as patient is worried he will be kicked out of his apartment as he cannot keep up with rent each month. If he were to be evicted, he will not have a place to live      Supportive and Palliative Oncology Encounter:  Emotional support provided  Coordination of care  Will continue to follow and address symptoms as needed      Advance Directives  Code Status: Full code    Next Follow-Up Visit:  Return to clinic in 1 month    Signature and billing  Medical complexity was moderate level due to due to complexity of problems, extensive data review, and high risk of management/treatment.  Time was spent on the following: Prep Time, Time Directly with Patient/Family/Caregiver, Documentation Time. Total time spent: 35      Data  Diagnostic tests and information reviewed for today's visit:  Most recent labs and imaging results, Medications     Some elements copied from Palliative Care note on 8/22/2024, the elements have been updated and all reflect current decision making from today, 9/25/2024.      Plan of Care discussed with: Patient    SIGNATURE: REED Yanes    Contact information:  Supportive and Palliative Oncology  Monday-Friday 8 AM-5 PM  Phone:  [phone], press option #5, then option #1.   Or Epic Secure Chat

## 2024-09-24 ENCOUNTER — TELEPHONE (OUTPATIENT)
Dept: RADIATION ONCOLOGY | Facility: HOSPITAL | Age: 44
End: 2024-09-24
Payer: MEDICAID

## 2024-09-24 ENCOUNTER — SOCIAL WORK (OUTPATIENT)
Dept: CASE MANAGEMENT | Facility: HOSPITAL | Age: 44
End: 2024-09-24

## 2024-09-24 NOTE — TELEPHONE ENCOUNTER
Called pt to remind of [facility] appointment on 09/25/24 at 4:00. Pt's phone went to voicemail left number if needs to reschedule.

## 2024-09-24 NOTE — PROGRESS NOTES
Shayne called JUNI and asked for verification of appointments and  time. Information shared, will touch in with Shayne tomorrow at appointments. Will continue to follow.

## 2024-09-25 ENCOUNTER — APPOINTMENT (OUTPATIENT)
Dept: RADIATION ONCOLOGY | Facility: HOSPITAL | Age: 44
End: 2024-09-25
Payer: MEDICAID

## 2024-09-25 ENCOUNTER — OFFICE VISIT (OUTPATIENT)
Dept: PALLIATIVE MEDICINE | Facility: HOSPITAL | Age: 44
End: 2024-09-25
Payer: MEDICAID

## 2024-09-25 ENCOUNTER — OFFICE VISIT (OUTPATIENT)
Dept: BEHAVIORAL HEALTH | Facility: HOSPITAL | Age: 44
End: 2024-09-25
Payer: MEDICAID

## 2024-09-25 ENCOUNTER — HOSPITAL ENCOUNTER (OUTPATIENT)
Dept: RADIATION ONCOLOGY | Facility: HOSPITAL | Age: 44
Setting detail: RADIATION/ONCOLOGY SERIES
Discharge: HOME | End: 2024-09-25
Payer: MEDICAID

## 2024-09-25 VITALS
BODY MASS INDEX: 25.42 KG/M2 | RESPIRATION RATE: 18 BRPM | SYSTOLIC BLOOD PRESSURE: 97 MMHG | WEIGHT: 171.6 LBS | DIASTOLIC BLOOD PRESSURE: 61 MMHG | TEMPERATURE: 96.8 F | HEART RATE: 85 BPM | OXYGEN SATURATION: 98 % | HEIGHT: 69 IN

## 2024-09-25 VITALS
RESPIRATION RATE: 18 BRPM | TEMPERATURE: 97.2 F | SYSTOLIC BLOOD PRESSURE: 84 MMHG | OXYGEN SATURATION: 98 % | HEART RATE: 86 BPM | DIASTOLIC BLOOD PRESSURE: 48 MMHG

## 2024-09-25 DIAGNOSIS — F20.0 PARANOID SCHIZOPHRENIA (MULTI): ICD-10-CM

## 2024-09-25 DIAGNOSIS — F31.9 BIPOLAR DEPRESSION (MULTI): ICD-10-CM

## 2024-09-25 DIAGNOSIS — F31.32 BIPOLAR AFFECTIVE DISORDER, CURRENTLY DEPRESSED, MODERATE (MULTI): ICD-10-CM

## 2024-09-25 DIAGNOSIS — Z51.81 ENCOUNTER FOR MONITORING OPIOID MAINTENANCE THERAPY: ICD-10-CM

## 2024-09-25 DIAGNOSIS — F41.9 ANXIETY AND DEPRESSION: ICD-10-CM

## 2024-09-25 DIAGNOSIS — K13.79 MOUTH PAIN: ICD-10-CM

## 2024-09-25 DIAGNOSIS — F32.A ANXIETY AND DEPRESSION: ICD-10-CM

## 2024-09-25 DIAGNOSIS — F14.90 COCAINE USE: ICD-10-CM

## 2024-09-25 DIAGNOSIS — F19.90 ILLICIT DRUG USE: ICD-10-CM

## 2024-09-25 DIAGNOSIS — C09.9 MALIGNANT NEOPLASM OF TONSIL: ICD-10-CM

## 2024-09-25 DIAGNOSIS — F15.10 METHAMPHETAMINE ABUSE (MULTI): ICD-10-CM

## 2024-09-25 DIAGNOSIS — Z51.5 ENCOUNTER FOR PALLIATIVE CARE: Primary | ICD-10-CM

## 2024-09-25 DIAGNOSIS — K11.7 XEROSTOMIA: ICD-10-CM

## 2024-09-25 DIAGNOSIS — G89.3 NEOPLASM RELATED PAIN: ICD-10-CM

## 2024-09-25 DIAGNOSIS — C09.9 TONSIL CANCER (MULTI): Primary | ICD-10-CM

## 2024-09-25 DIAGNOSIS — K59.03 DRUG-INDUCED CONSTIPATION: ICD-10-CM

## 2024-09-25 DIAGNOSIS — Z79.891 ENCOUNTER FOR MONITORING OPIOID MAINTENANCE THERAPY: ICD-10-CM

## 2024-09-25 LAB
AMPHETAMINES UR QL SCN: ABNORMAL
BARBITURATES UR QL SCN: ABNORMAL
BENZODIAZ UR QL SCN: ABNORMAL
BZE UR QL SCN: ABNORMAL
CANNABINOIDS UR QL SCN: ABNORMAL
FENTANYL+NORFENTANYL UR QL SCN: ABNORMAL
METHADONE UR QL SCN: ABNORMAL
OPIATES UR QL SCN: ABNORMAL
OXYCODONE+OXYMORPHONE UR QL SCN: ABNORMAL
PCP UR QL SCN: ABNORMAL

## 2024-09-25 PROCEDURE — 99214 OFFICE O/P EST MOD 30 MIN: CPT

## 2024-09-25 PROCEDURE — 80307 DRUG TEST PRSMV CHEM ANLYZR: CPT | Performed by: NURSE PRACTITIONER

## 2024-09-25 PROCEDURE — 80353 DRUG SCREENING COCAINE: CPT | Performed by: NURSE PRACTITIONER

## 2024-09-25 PROCEDURE — 99214 OFFICE O/P EST MOD 30 MIN: CPT | Performed by: NURSE PRACTITIONER

## 2024-09-25 PROCEDURE — 99213 OFFICE O/P EST LOW 20 MIN: CPT | Mod: AM | Performed by: PSYCHIATRY & NEUROLOGY

## 2024-09-25 RX ORDER — CHLORPROMAZINE HYDROCHLORIDE 50 MG/1
200 TABLET, FILM COATED ORAL NIGHTLY
Qty: 120 TABLET | Refills: 1 | Status: SHIPPED | OUTPATIENT
Start: 2024-09-25

## 2024-09-25 RX ORDER — SERTRALINE HYDROCHLORIDE 50 MG/1
50 TABLET, FILM COATED ORAL DAILY
Qty: 30 TABLET | Refills: 1 | Status: SHIPPED | OUTPATIENT
Start: 2024-09-25

## 2024-09-25 RX ORDER — METHADONE HYDROCHLORIDE 5 MG/1
15 TABLET ORAL EVERY 8 HOURS
Qty: 135 TABLET | Refills: 0 | Status: SHIPPED | OUTPATIENT
Start: 2024-10-10 | End: 2024-10-25

## 2024-09-25 ASSESSMENT — ENCOUNTER SYMPTOMS
PALPITATIONS: 0
OCCASIONAL FEELINGS OF UNSTEADINESS: 0
CONFUSION: 0
DIZZINESS: 1
DEPRESSION: 0
VOMITING: 0
ABDOMINAL PAIN: 0
COUGH: 0
LOSS OF SENSATION IN FEET: 0
NUMBNESS: 0
SORE THROAT: 1
SHORTNESS OF BREATH: 0
NECK PAIN: 0
HEMATURIA: 0
NERVOUS/ANXIOUS: 0
CHILLS: 0
NECK STIFFNESS: 0
EYE PROBLEMS: 0
TROUBLE SWALLOWING: 1
ABDOMINAL DISTENTION: 0
ADENOPATHY: 0
FATIGUE: 0
SPEECH DIFFICULTY: 0
DEPRESSION: 1
BACK PAIN: 0
EXTREMITY WEAKNESS: 0
NAUSEA: 0
CONSTIPATION: 0
DIFFICULTY URINATING: 0
ARTHRALGIAS: 0
LIGHT-HEADEDNESS: 0
CHEST TIGHTNESS: 0
HEADACHES: 0

## 2024-09-25 ASSESSMENT — PATIENT HEALTH QUESTIONNAIRE - PHQ9
7. TROUBLE CONCENTRATING ON THINGS, SUCH AS READING THE NEWSPAPER OR WATCHING TELEVISION: NOT AT ALL
SUM OF ALL RESPONSES TO PHQ9 QUESTIONS 1 AND 2: 6
2. FEELING DOWN, DEPRESSED OR HOPELESS: NEARLY EVERY DAY
2. FEELING DOWN, DEPRESSED OR HOPELESS: NEARLY EVERY DAY
8. MOVING OR SPEAKING SO SLOWLY THAT OTHER PEOPLE COULD HAVE NOTICED. OR THE OPPOSITE, BEING SO FIGETY OR RESTLESS THAT YOU HAVE BEEN MOVING AROUND A LOT MORE THAN USUAL: NOT AT ALL
SUM OF ALL RESPONSES TO PHQ9 QUESTIONS 1 AND 2: 6
SUM OF ALL RESPONSES TO PHQ QUESTIONS 1-9: 6
3. TROUBLE FALLING OR STAYING ASLEEP OR SLEEPING TOO MUCH: NOT AT ALL
9. THOUGHTS THAT YOU WOULD BE BETTER OFF DEAD, OR OF HURTING YOURSELF: NOT AT ALL
1. LITTLE INTEREST OR PLEASURE IN DOING THINGS: NEARLY EVERY DAY
4. FEELING TIRED OR HAVING LITTLE ENERGY: NOT AT ALL
6. FEELING BAD ABOUT YOURSELF - OR THAT YOU ARE A FAILURE OR HAVE LET YOURSELF OR YOUR FAMILY DOWN: NOT AT ALL
5. POOR APPETITE OR OVEREATING: NOT AT ALL
1. LITTLE INTEREST OR PLEASURE IN DOING THINGS: NEARLY EVERY DAY

## 2024-09-25 ASSESSMENT — PAIN SCALES - GENERAL
PAINLEVEL: 0-NO PAIN
PAINLEVEL: 0-NO PAIN
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
## 2024-09-25 NOTE — PROGRESS NOTES
Radiation Oncology Follow-Up    Patient Name:  Shayne Messer  MRN:  [id number]  :  1980    Referring Provider: No ref. 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 No extension to BOT and no separate lesions or masses identified; biopsy of right tonsillar mass: invasive p16+ SCC    SUBJECTIVE  History of Present Illness:  Shayne Messer is a 43 y.o. male who was previously seen at the [facility] Department of Radiation Oncology for advanced tonsillar SCC with right cervical LN involvement.  Treatment consisted of chemoradiation (Cisplatin 100mg/m2 q3wks/70 Gy in 35 fx) ending on 23.  Today the patient is in clinic for a routine Radiation Oncology FU visit.  Prior to this visit, he had been lost to FU.  Patient states that he has pretty severe mouth pain related to decaying/infected teeth.  He recently was seen in the ER for an left facial abscess which required treatment with ABX (Keflex).  We discussed the importance with following up with the [facility] to manage his decaying teeth and he was agreeable.  Patient endorses having the contact info for the clinic.  Because of his oral pain, he's only able to drink supplements.  He has tried solid foods in the past but states that he does cough when swallowing.  He was able to meet with Rodríguez Amaro RD today who provided some sample  "drinks for him to take home.  He continues to have some oral dryness with has been stable.  Denies chills, fever, fatigue, SOB or chest pain.  Denies new lumps/bumps/discolorations in his mouth and around his head, neck and shoulders.  Denies headaches, vision changes, hearing changes, or new focal sensory/motor deficits.  Denies abdominal pain, nausea, vomiting, diarrhea or constipation.     Patient expressed worry about housing.  He's currently in the [facility], but states that he's going to be \"kicked out because he has no money\".  His stay there is mostly subsidized by [facility] and only requires him to pay $25/month.  He states that he's currently out of work and is unable to make payment.  He does state that he is isn't food insecure as he does get food stamps through [employer].  He had been utilizing his mother in the past for some assistance but states that he had a falling out with her and hasn't spoken to her in a year.  I was able to reach out to JUNI Finney about the patients predicament and she is currently discussing options with him.      Note: Patient endorses continue use of cocaine.  When asked how he secures drug, he states that he gets it through his girlfriend.  Patient states that he only uses it because of his oral pain.  Is not interested in stopping at this point.  We discussed that it might limit his housing options and the patient is aware.  Note: He's currently being managed by [facility], however, because of his addictive behavior he his being weaned off of Methadone and will be referred to addiction medicine.      Treatment Rendered:   No radiation treatments to show. (Treatments may have been administered in another system.)       Review of Systems:   Review of Systems   Constitutional:  Negative for chills and fatigue.   HENT:   Positive for sore throat and trouble swallowing. Negative for hearing loss, lump/mass and mouth sores.    Eyes:  Negative for eye " "problems.   Respiratory:  Negative for chest tightness, cough and shortness of breath.    Cardiovascular:  Negative for chest pain and palpitations.   Gastrointestinal:  Negative for abdominal distention, abdominal pain, constipation, nausea and vomiting.   Genitourinary:  Negative for difficulty urinating and hematuria.    Musculoskeletal:  Negative for arthralgias, back pain, gait problem, neck pain and neck stiffness.   Neurological:  Positive for dizziness. Negative for extremity weakness, gait problem, headaches, light-headedness, numbness and speech difficulty.   Hematological:  Negative for adenopathy.   Psychiatric/Behavioral:  Negative for confusion and depression. The patient is not nervous/anxious.      Performance Status:   The Karnofsky performance scale today is 90, Able to carry on normal activity; minor signs or symptoms of disease (ECOG equivalent 0).    Pain: Patient endorses oral pain today (8/10).  He's currently being managed by [facility], however, because of his addictive behavior he his being weaned off of Methadone and will be referred to addiction medicine.      OBJECTIVE  Vital Signs:      6/14/2024    10:45 AM 8/7/2024     3:29 PM 8/22/2024     1:26 PM 9/10/2024     4:14 AM 9/10/2024     1:03 PM 9/25/2024     2:09 PM 9/25/2024     3:46 PM   Vitals   Systolic 114 121 107 138 131 84 97   Diastolic 77 72 67 89 79 48 61   Heart Rate 62 69 82 94 86 86 85   Temp  35.8 °C (96.4 °F) 36.3 °C (97.3 °F) 37 °C (98.6 °F)  36.2 °C (97.2 °F) 36 °C (96.8 °F)   Resp 16 16 18 16 16 18 18   Height (in)    1.753 m (5' 9\")   1.753 m (5' 9\")   Weight (lb)  160 164.24 156.2   171.6   BMI  23.63 kg/m2 24.25 kg/m2 23.07 kg/m2   25.34 kg/m2   BSA (m2)  1.88 m2 1.9 m2 1.86 m2   1.95 m2   Visit Report  Report Report    Report   Report    Report Report    Report      Physical Exam  Constitutional:       General: He is not in acute distress.     Appearance: Normal appearance. He is normal weight. He is not " ill-appearing, toxic-appearing or diaphoretic.   HENT:      Head: Normocephalic and atraumatic.      Jaw: Trismus and tenderness present.      Nose: Nose normal. No congestion or rhinorrhea.      Mouth/Throat:      Mouth: Mucous membranes are moist.      Dentition: Abnormal dentition. Dental tenderness and dental caries present.      Tongue: No lesions.      Pharynx: No pharyngeal swelling.   Eyes:      General: Lids are normal. Gaze aligned appropriately.      Extraocular Movements: Extraocular movements intact.      Pupils: Pupils are equal, round, and reactive to light.   Neck:      Thyroid: No thyroid mass or thyroid tenderness.   Cardiovascular:      Rate and Rhythm: Normal rate and regular rhythm.      Pulses: Normal pulses.      Heart sounds: Normal heart sounds. No murmur heard.  Pulmonary:      Effort: Pulmonary effort is normal. No tachypnea or respiratory distress.      Breath sounds: Normal breath sounds. No wheezing or rhonchi.   Abdominal:      General: Abdomen is flat. Bowel sounds are normal. There is no distension.      Palpations: Abdomen is soft. There is no mass.      Tenderness: There is no abdominal tenderness. There is no guarding or rebound.   Musculoskeletal:         General: No swelling, tenderness, deformity or signs of injury. Normal range of motion.      Cervical back: Full passive range of motion without pain, normal range of motion and neck supple. No rigidity or tenderness. No pain with movement. Normal range of motion.      Right lower leg: No edema.      Left lower leg: No edema.   Lymphadenopathy:      Head:      Right side of head: No submental, submandibular, tonsillar, preauricular, posterior auricular or occipital adenopathy.      Left side of head: No submental, submandibular, tonsillar, preauricular, posterior auricular or occipital adenopathy.      Cervical:      Right cervical: No superficial, deep or posterior cervical adenopathy.     Left cervical: No superficial, deep or  posterior cervical adenopathy.   Skin:     General: Skin is warm and dry.      Capillary Refill: Capillary refill takes less than 2 seconds.      Coloration: Skin is not jaundiced.      Findings: No erythema, lesion or rash.   Neurological:      General: No focal deficit present.      Mental Status: He is alert and oriented to person, place, and time.      Motor: No weakness.      Coordination: Coordination normal.      Gait: Gait normal.   Psychiatric:         Attention and Perception: Attention normal.         Mood and Affect: Mood normal.         Behavior: Behavior normal. Behavior is cooperative.       ASSESSMENT:   43 y.o. male who was previously seen at the [facility] Department of Radiation Oncology for advanced tonsillar SCC with right cervical LN involvement.  Treatment consisted of chemoradiation (Cisplatin 100mg/m2 q3wks/70 Gy in 35 fx) ending on 2/28/23.  Today the patient is in clinic for a routine Radiation Oncology FU visit.  Prior to this visit, he had been lost to FU.  Patient states that he has pretty severe mouth pain related to decaying/infected teeth.  He recently was seen in the ER for an left facial abscess which required treatment with ABX (Keflex).  We discussed the importance with following up with the [facility] to manage his decaying teeth and he was agreeable.  Patient endorses having the contact info for the clinic.  Because of his oral pain, he's only able to drink supplements.  He has tried solid foods in the past but states that he does cough when swallowing.  He was able to meet with Rodríguez Amaro RD today who provided some sample drinks for him to take home.  He continues to have some oral dryness with has been stable.  Denies chills, fever, fatigue, SOB or chest pain.  Denies new lumps/bumps/discolorations in his mouth and around his head, neck and shoulders.  Denies headaches, vision changes, hearing changes, or new focal sensory/motor  deficits.  Denies abdominal pain, nausea, vomiting, diarrhea or constipation.      PLAN:     --FU with Alexandro Zarate CNP in 3 months.   --Continue to follow with  to help secure housing.   --Continue to follow with [facility].   --Patient to self-schedule at the [facility] for management of decaying teeth.     Please reach out with any question or concerns.     NCCN Guidelines were applicable to guide this patients treatment plan.  SINDY Ghosh-CNP

## 2024-09-26 ENCOUNTER — SOCIAL WORK (OUTPATIENT)
Dept: CASE MANAGEMENT | Facility: HOSPITAL | Age: 44
End: 2024-09-26
Payer: MEDICAID

## 2024-09-26 ENCOUNTER — CLINICAL SUPPORT (OUTPATIENT)
Dept: EMERGENCY MEDICINE | Facility: HOSPITAL | Age: 44
End: 2024-09-26
Payer: MEDICAID

## 2024-09-26 ENCOUNTER — HOSPITAL ENCOUNTER (OUTPATIENT)
Facility: HOSPITAL | Age: 44
Setting detail: OBSERVATION
Discharge: OTHER INSTITUTION | End: 2024-09-27
Attending: STUDENT IN AN ORGANIZED HEALTH CARE EDUCATION/TRAINING PROGRAM | Admitting: STUDENT IN AN ORGANIZED HEALTH CARE EDUCATION/TRAINING PROGRAM
Payer: MEDICAID

## 2024-09-26 DIAGNOSIS — T50.902A: Primary | ICD-10-CM

## 2024-09-26 LAB
ALBUMIN SERPL BCP-MCNC: 3.4 G/DL (ref 3.4–5)
ALP SERPL-CCNC: 64 U/L (ref 33–120)
ALT SERPL W P-5'-P-CCNC: 7 U/L (ref 10–52)
AMPHETAMINES UR QL SCN: ABNORMAL
ANION GAP SERPL CALC-SCNC: 15 MMOL/L (ref 10–20)
APAP SERPL-MCNC: <10 UG/ML
AST SERPL W P-5'-P-CCNC: 12 U/L (ref 9–39)
ATRIAL RATE: 89 BPM
BARBITURATES UR QL SCN: ABNORMAL
BASOPHILS # BLD AUTO: 0.02 X10*3/UL (ref 0–0.1)
BASOPHILS NFR BLD AUTO: 0.2 %
BENZODIAZ UR QL SCN: ABNORMAL
BILIRUB SERPL-MCNC: 0.2 MG/DL (ref 0–1.2)
BUN SERPL-MCNC: 12 MG/DL (ref 6–23)
BZE UR QL SCN: ABNORMAL
CALCIUM SERPL-MCNC: 8.7 MG/DL (ref 8.6–10.6)
CANNABINOIDS UR QL SCN: ABNORMAL
CHLORIDE SERPL-SCNC: 99 MMOL/L (ref 98–107)
CO2 SERPL-SCNC: 27 MMOL/L (ref 21–32)
CREAT SERPL-MCNC: 0.88 MG/DL (ref 0.5–1.3)
EGFRCR SERPLBLD CKD-EPI 2021: >90 ML/MIN/1.73M*2
EOSINOPHIL # BLD AUTO: 0.1 X10*3/UL (ref 0–0.7)
EOSINOPHIL NFR BLD AUTO: 1 %
ERYTHROCYTE [DISTWIDTH] IN BLOOD BY AUTOMATED COUNT: 11.9 % (ref 11.5–14.5)
ETHANOL SERPL-MCNC: <10 MG/DL
FENTANYL+NORFENTANYL UR QL SCN: ABNORMAL
GLUCOSE SERPL-MCNC: 94 MG/DL (ref 74–99)
HCT VFR BLD AUTO: 34.9 % (ref 41–52)
HGB BLD-MCNC: 11.7 G/DL (ref 13.5–17.5)
HOLD SPECIMEN: NORMAL
IMM GRANULOCYTES # BLD AUTO: 0.04 X10*3/UL (ref 0–0.7)
IMM GRANULOCYTES NFR BLD AUTO: 0.4 % (ref 0–0.9)
LYMPHOCYTES # BLD AUTO: 0.42 X10*3/UL (ref 1.2–4.8)
LYMPHOCYTES NFR BLD AUTO: 4 %
MCH RBC QN AUTO: 31.7 PG (ref 26–34)
MCHC RBC AUTO-ENTMCNC: 33.5 G/DL (ref 32–36)
MCV RBC AUTO: 95 FL (ref 80–100)
METHADONE UR QL SCN: ABNORMAL
MONOCYTES # BLD AUTO: 0.7 X10*3/UL (ref 0.1–1)
MONOCYTES NFR BLD AUTO: 6.7 %
NEUTROPHILS # BLD AUTO: 9.12 X10*3/UL (ref 1.2–7.7)
NEUTROPHILS NFR BLD AUTO: 87.7 %
NRBC BLD-RTO: 0 /100 WBCS (ref 0–0)
OPIATES UR QL SCN: ABNORMAL
OXYCODONE+OXYMORPHONE UR QL SCN: ABNORMAL
P AXIS: 58 DEGREES
P OFFSET: 199 MS
P ONSET: 147 MS
PCP UR QL SCN: ABNORMAL
PLATELET # BLD AUTO: 400 X10*3/UL (ref 150–450)
POTASSIUM SERPL-SCNC: 4.3 MMOL/L (ref 3.5–5.3)
PR INTERVAL: 144 MS
PROT SERPL-MCNC: 6.3 G/DL (ref 6.4–8.2)
Q ONSET: 219 MS
QRS COUNT: 14 BEATS
QRS DURATION: 90 MS
QT INTERVAL: 398 MS
QTC CALCULATION(BAZETT): 484 MS
QTC FREDERICIA: 453 MS
R AXIS: 62 DEGREES
RBC # BLD AUTO: 3.69 X10*6/UL (ref 4.5–5.9)
SALICYLATES SERPL-MCNC: <3 MG/DL
SODIUM SERPL-SCNC: 137 MMOL/L (ref 136–145)
T AXIS: 46 DEGREES
T OFFSET: 418 MS
VENTRICULAR RATE: 89 BPM
WBC # BLD AUTO: 10.4 X10*3/UL (ref 4.4–11.3)

## 2024-09-26 PROCEDURE — 80053 COMPREHEN METABOLIC PANEL: CPT

## 2024-09-26 PROCEDURE — 84132 ASSAY OF SERUM POTASSIUM: CPT

## 2024-09-26 PROCEDURE — 93005 ELECTROCARDIOGRAM TRACING: CPT

## 2024-09-26 PROCEDURE — 36415 COLL VENOUS BLD VENIPUNCTURE: CPT

## 2024-09-26 PROCEDURE — 99285 EMERGENCY DEPT VISIT HI MDM: CPT | Mod: 25

## 2024-09-26 PROCEDURE — 85025 COMPLETE CBC W/AUTO DIFF WBC: CPT

## 2024-09-26 PROCEDURE — 80320 DRUG SCREEN QUANTALCOHOLS: CPT

## 2024-09-26 PROCEDURE — 93010 ELECTROCARDIOGRAM REPORT: CPT | Performed by: STUDENT IN AN ORGANIZED HEALTH CARE EDUCATION/TRAINING PROGRAM

## 2024-09-26 PROCEDURE — 2500000004 HC RX 250 GENERAL PHARMACY W/ HCPCS (ALT 636 FOR OP/ED): Mod: SE

## 2024-09-26 PROCEDURE — 99285 EMERGENCY DEPT VISIT HI MDM: CPT | Performed by: STUDENT IN AN ORGANIZED HEALTH CARE EDUCATION/TRAINING PROGRAM

## 2024-09-26 PROCEDURE — 96360 HYDRATION IV INFUSION INIT: CPT

## 2024-09-26 PROCEDURE — 80307 DRUG TEST PRSMV CHEM ANLYZR: CPT

## 2024-09-26 SDOH — HEALTH STABILITY: MENTAL HEALTH: CONTENT: BLAMING SELF

## 2024-09-26 SDOH — HEALTH STABILITY: MENTAL HEALTH: WHEN DID YOU TRY TO KILL YOURSELF?: TODAY

## 2024-09-26 SDOH — HEALTH STABILITY: MENTAL HEALTH: BEHAVIORS/MOOD: FLAT AFFECT;GUARDED;HALLUCINATIONS;IRRITABLE

## 2024-09-26 SDOH — HEALTH STABILITY: MENTAL HEALTH: HALLUCINATION: OLFACTORY;VISUAL

## 2024-09-26 SDOH — HEALTH STABILITY: MENTAL HEALTH: CONTENT: UNREMARKABLE

## 2024-09-26 SDOH — HEALTH STABILITY: MENTAL HEALTH: SUICIDAL BEHAVIOR (LIFETIME): YES

## 2024-09-26 SDOH — HEALTH STABILITY: MENTAL HEALTH: ANXIETY SYMPTOMS: GENERALIZED

## 2024-09-26 SDOH — HEALTH STABILITY: MENTAL HEALTH: ARE YOU HAVING THOUGHTS OF KILLING YOURSELF RIGHT NOW?: YES

## 2024-09-26 SDOH — HEALTH STABILITY: MENTAL HEALTH: IN THE PAST FEW WEEKS, HAVE YOU FELT THAT YOU OR YOUR FAMILY WOULD BE BETTER OFF IF YOU WERE DEAD?: YES

## 2024-09-26 SDOH — HEALTH STABILITY: MENTAL HEALTH: NEEDS EXPRESSED: DENIES

## 2024-09-26 SDOH — HEALTH STABILITY: MENTAL HEALTH: BEHAVIORAL HEALTH(WDL): EXCEPTIONS TO WDL

## 2024-09-26 SDOH — HEALTH STABILITY: MENTAL HEALTH: ACTIVE SUICIDAL IDEATION WITH SPECIFIC PLAN AND INTENT (PAST 1 MONTH): YES

## 2024-09-26 SDOH — HEALTH STABILITY: MENTAL HEALTH
DEPRESSION SYMPTOMS: FEELINGS OF HELPLESSNESS;FEELINGS OF HOPELESSESS;FEELINGS OF WORTHLESSNESS;LOSS OF INTEREST;ISOLATIVE

## 2024-09-26 SDOH — HEALTH STABILITY: MENTAL HEALTH: SLEEP PATTERN: SLEEPS ALL NIGHT

## 2024-09-26 SDOH — HEALTH STABILITY: MENTAL HEALTH: DELUSIONS: CONTROLLED

## 2024-09-26 SDOH — HEALTH STABILITY: MENTAL HEALTH: IN THE PAST FEW WEEKS, HAVE YOU WISHED YOU WERE DEAD?: YES

## 2024-09-26 SDOH — HEALTH STABILITY: MENTAL HEALTH

## 2024-09-26 SDOH — HEALTH STABILITY: MENTAL HEALTH: NON-SPECIFIC ACTIVE SUICIDAL THOUGHTS (PAST 1 MONTH): YES

## 2024-09-26 SDOH — HEALTH STABILITY: MENTAL HEALTH: ACTIVE SUICIDAL IDEATION WITH SOME INTENT TO ACT, WITHOUT SPECIFIC PLAN (PAST 1 MONTH): YES

## 2024-09-26 SDOH — HEALTH STABILITY: MENTAL HEALTH: SUICIDAL BEHAVIOR (3 MONTHS): NO

## 2024-09-26 SDOH — HEALTH STABILITY: MENTAL HEALTH: NEEDS EXPRESSED: EMOTIONAL

## 2024-09-26 SDOH — HEALTH STABILITY: MENTAL HEALTH: WISH TO BE DEAD (PAST 1 MONTH): YES

## 2024-09-26 SDOH — HEALTH STABILITY: MENTAL HEALTH: HOW DID YOU TRY TO KILL YOURSELF?: YES

## 2024-09-26 SDOH — HEALTH STABILITY: MENTAL HEALTH: SUICIDAL BEHAVIOR (DESCRIPTION): OD ON MEDICATION

## 2024-09-26 SDOH — HEALTH STABILITY: MENTAL HEALTH: SLEEP PATTERN: OTHER (COMMENT)

## 2024-09-26 SDOH — ECONOMIC STABILITY: HOUSING INSECURITY: FEELS SAFE LIVING IN HOME: YES

## 2024-09-26 SDOH — HEALTH STABILITY: MENTAL HEALTH: HAVE YOU EVER TRIED TO KILL YOURSELF?: YES

## 2024-09-26 SDOH — HEALTH STABILITY: MENTAL HEALTH: IN THE PAST WEEK, HAVE YOU BEEN HAVING THOUGHTS ABOUT KILLING YOURSELF?: YES

## 2024-09-26 SDOH — HEALTH STABILITY: MENTAL HEALTH: BEHAVIORS/MOOD: SAD;PLEASANT

## 2024-09-26 SDOH — ECONOMIC STABILITY: GENERAL: FINANCIAL CONCERNS: UNABLE TO PAY BILLS;UNABLE TO MEET BASIC NEEDS

## 2024-09-26 ASSESSMENT — COLUMBIA-SUICIDE SEVERITY RATING SCALE - C-SSRS
1. IN THE PAST MONTH, HAVE YOU WISHED YOU WERE DEAD OR WISHED YOU COULD GO TO SLEEP AND NOT WAKE UP?: YES
5. HAVE YOU STARTED TO WORK OUT OR WORKED OUT THE DETAILS OF HOW TO KILL YOURSELF? DO YOU INTEND TO CARRY OUT THIS PLAN?: YES
6. HAVE YOU EVER DONE ANYTHING, STARTED TO DO ANYTHING, OR PREPARED TO DO ANYTHING TO END YOUR LIFE?: YES
6. HAVE YOU EVER DONE ANYTHING, STARTED TO DO ANYTHING, OR PREPARED TO DO ANYTHING TO END YOUR LIFE?: YES
2. HAVE YOU ACTUALLY HAD ANY THOUGHTS OF KILLING YOURSELF?: YES

## 2024-09-26 ASSESSMENT — PAIN SCALES - GENERAL
PAINLEVEL_OUTOF10: 0 - NO PAIN

## 2024-09-26 ASSESSMENT — LIFESTYLE VARIABLES
SUBSTANCE_ABUSE_PAST_12_MONTHS: YES
PRESCIPTION_ABUSE_PAST_12_MONTHS: NO

## 2024-09-26 ASSESSMENT — PAIN - FUNCTIONAL ASSESSMENT
PAIN_FUNCTIONAL_ASSESSMENT: 0-10
PAIN_FUNCTIONAL_ASSESSMENT: 0-10

## 2024-09-26 NOTE — PROGRESS NOTES
"JUNI met with Shayne at \[facility]\"" onc appointment per request by Alexandro Zarate. Alexandro provided summary of appointment to JUNI. Shayne declined psychosocial at this time due to mouth pain. Shayne shared that he is in a lot of pain from his mouth, fears that he will be evicted soon, and is struggling with addiction to cocaine. JUNI ensured that Shayne was safe and had no intentions on harming himself/others or ending his life. He explained that his mouth is very painful and he doesn't know what to do. JUNI asked if he had used the free/reduced dental clinics that were provided to him, he shared that he had not. He explained that he is also facing possible eviction as he cannot pay the monthly amount. He shared that he is staying at [facility] and typically pays $25 a month, but cannot afford it. JUNI did call and he is facing eviction within the next weeks. He explained that there is no food insecurities and he is not without running water or electric. He explained that he has food stamps and gets cocaine from his girlfriend but they are \"not doing well\" at the moment. He also mentioned that he was denied SSDI but has a hearing in November, JUNI to call and inquire. JUNI provided empathetic listening and validated emotions. He admitted that he does not have any friends or family that he can turn to. He also explained that he and his mother do not have a relationship anymore.     Asked Shayne if he felt that he was ready for rehab, he shared that he is and wants to go wherever someone will work with him and take him in. JUNI to call local rehabilitation centers to gather options for Shayne.  Also will provide Shayne with another list of dental clinics that are free and reduced. Shayne does not want to go to [facility] due to a bad experience he had previously.     JUNI called the following various rehabilitation centers:    [facility] is a 28 day stay with stabilization thereafter.     [facility], asked to " send in facesheet, will discuss with Shayne if he is comfortable with this option.     [facility] (located in [place], full nursing staff and referral to dental)    JUNI called the following dental clinics:    [facility], no answer. Left  to return call for referral.     [facility], suggested a walk in but no guarantee of services same day, doctors are booked out in advance.     [facility] (no services available due to systems being down for foreseeable future).    [facility], referred him to [facility].     [facility], urged me to call first thing at 8:00 to get same day appointment for JUNI Bella to call immediately tomorrow to get him in. Agency can do referral to oral surgeon if needed and get him in as an emergent patient.     Rent Assistance:    [facility], referred me to [phone] option 4, representative shared that there are no resources available. Shared that when he is labeled as disabled there may be better options.       JUNI called Shayne this morning to provide update, no answer. Left  to return call at earliest convenience to review findings and see what he would like to do moving forward. Will call again this afternoon to touch base.

## 2024-09-26 NOTE — ED PROVIDER NOTES
History of Present Illness     History provided by: Patient  Limitations to History: Confusion, Intoxication, and Mental Illness  External Records Reviewed with Brief Summary: None    HPI:  Shayne Messer is a 43-year-old male history of oral cancer on methadone, polysubstance use disorder, anxiety, psychosis/paranoia, prior suicide attempt via OD presenting to the ED today with concern for overdose.  Patient called social work at approximately 4:22 PM stating that he took too many pills, EMS was dispatched to the residence.  Patient is unclear and how much he took, he estimates 30-60 chlorpromazine (50 mg) tablets (approximately 1500 to 3000 mg) unsure of last fill time, in addition to approximately 8 tablets mirtazapine (7.5 mg) unsure of last fill time, estimates of 9/17 (approximately 60 mg) throughout today.  He initially told EMS he started his intentional ingestion around 4 AM and would take handfuls of pills at a time however he also states he may have taken a handful of unknown tablets last night sometime.  He states he did not take his methadone today but does take 20 mg of methadone 3 times daily which he took all of his doses yesterday.  He denies any additional drug or alcohol use, denies Tylenol, aspirin or other coingestions.  Patient is somnolent but awakens to verbal stimuli, endorses history of prior intentional overdoses.         Physical Exam   Triage vitals:  T 36.5 °C (97.7 °F)  HR 75  /69  RR 14  O2 94 % None (Room air)    General: Awake, somnolent, oriented x3, diaphoretic  Eyes: Gaze conjugate.  No scleral icterus or injection, Pupils 3-2 equal and reactive  HENT: Normo-cephalic, atraumatic. No stridor.   CV: Regular rate, regular rhythm. Cap refill less than 2 seconds  Resp: Breathing non-labored, clear to auscultation bilaterally  GI: Soft, non-distended, non-tender. No rebound or guarding.  MSK/Extremities: No gross bony deformities. Moving all extremities  Skin: Warm.  Appropriate color  Neuro: Somnolent but arouses easily to voice.  Oriented x 3.  No clonus.  No rigidity.  Strength equal bilateral upper and lower extremities.  Face symmetric.  Psych: Endorsing active suicidal ideation with intentional coingestions    Medical Decision Making & ED Course   Medical Decision Makin-year-old male presenting for intentional overdose ingestion with concern for chlorpromazine and mirtazapine.  Patient is ANO x 3, GCS 15 on arrival, somnolent but easily arouses to voice.  Unclear ingestion time, estimated around 4 AM however patient may have also ingested sometime yesterday, appears that he took multiple handfuls of pills throughout the day without 1 massive ingestion.  Estimated approximately 1500 to 3000 mg of chlorpromazine and 60 to 75 mg of mirtazapine.  Patient vitals stable on arrival, no signs of respiratory depression, no clonus or rigidity, no signs of serotonin toxicity, no signs of anticholinergic effects.  ECG showing no evidence of QTc or QRS abnormalities.  Patient given IV fluid bolus.  Spoke with toxicology who recommended at least 6-hour observation prior to medical clearance.  No signs of Co. ingestion on tox panel.  Labs showing no significant electrolyte derangements.  Additionally reached out to toxicology, largest effects of these medications to CNS depression, placed on end-tidal CO2 for monitoring of signs of hypoventilation.  Recommending 8 hours total observation.  Patient will require psychiatry evaluation once medically cleared in the setting of intentional overdose.  Throughout ED course he did have improvement in somnolence and mentation, was provided p.o. challenge.  Patient signed out to oncoming provider pending EPAT recommendations and final medication clearance.  ----      Differential diagnoses considered include but are not limited to: Intentional overdose, serotonin syndrome, anticholinergic toxidrome, antihistamine toxidrome, arrhythmias, ECG  abnormalities, suicidal ideation     Social Determinants of Health which Significantly Impact Care: Substance use disorder and Mental health disorder The following actions were taken to address these social determinants: Psychiatry evaluation    EKG Independent Interpretation: See ED course for my independent interpretation if ECG was obtained.    Independent Result Review and Interpretation: Please see MDM and ED course for my independent interpretation of the results    Chronic conditions affecting the patient's care: Please see H&P and MDM    The patient was discussed with the following consultants/services:  EPAT, med tox, poison control    Care Considerations: As document above in [facility]    ED Course:  ED Course as of 09/26/24 2150   Thu Sep 26, 2024   1727 At this time patient is not a great historian in eliciting the doses of medications he took.  Chlorpromazine poses risk of central nervous system depression, patient is currently being closely monitored, is currently on end-tidal CO2 with frequent neurochecks and awakenings.  Mirtazapine can cause mild tachycardia, hypertension, drowsiness and CNS depression in large doses.  Plan to obtain coingestion labs, treat patient with IV fluids and get EKG for interval monitoring.  Patient has no clonus or rigidity on exam. [KR]   1728 Plan to consult med tox as well as poison control [KR]   1738 Electrocardiogram, 12-lead  ECG normal sinus rhythm with sinus arrhythmia rate 89, normal axis.  No acute ST segment elevation or depression.  , QRS 90, QTc 484. [KR]   1808 Opened case with poison control, recommending approximately 4 hours of observation after Remeron ingestion and 6 hours of observation after Thorazine ingestion or until patient returns to baseline prior to medical clearance based on half-life's. [KR]   1917 Acute Toxicology Panel, Blood  No evidence of coingestions [KR]   1917 Comprehensive Metabolic Panel(!)  No electrolyte abnormalities, no renal or  liver dysfunction [KR]   2148 Electrocardiogram, 12-lead  Repeat ECG showing normal sinus rhythm, right axis deviation.  No acute ST segment elevation or depression.  , QRS 90, QTc 469. [KR]   2149 Patient is having improvement in somnolence, requesting something to eat will be provided with some food. [KR]      ED Course User Index  [KR] Livia Christiansen DO     Disposition   Patient was signed out to oncoming provider at 2300 pending completion of their work-up.  Please see the next provider's transition of care note for the remainder of the patient's care.     Procedures   Procedures    Patient seen and discussed with attending physician    Livia Christiansen DO  Emergency Medicine     Livia Christiansen DO  Resident  09/26/24 2453

## 2024-09-26 NOTE — PROGRESS NOTES
Shayne called JUNI and shared that he has taken too many pills and needs to go to the ED. He did not elaborate on pills taken, how many, or when he took them. JUNI called [facility] emergency line to dispatch EMS to his residence. Will continue to follow.

## 2024-09-26 NOTE — ED TRIAGE NOTES
"44 yo BIB [facility] for intentional overdose with 30-60 thorazine 300mg tabs and 10-20 remeron 7.5mg tabs. Pt states he is the one whom called for EMS once he began getting dizzy from meds. Pt states \"life is too hard and I just want to die.\" Pt states he has attempted suicide previously with overdose and cutting. Pt calm and cooperative. Drowsy, but answers questions appropriately.  "

## 2024-09-27 VITALS
HEIGHT: 69 IN | RESPIRATION RATE: 17 BRPM | OXYGEN SATURATION: 98 % | SYSTOLIC BLOOD PRESSURE: 104 MMHG | WEIGHT: 170 LBS | BODY MASS INDEX: 25.18 KG/M2 | DIASTOLIC BLOOD PRESSURE: 65 MMHG | TEMPERATURE: 97.9 F | HEART RATE: 80 BPM

## 2024-09-27 LAB
ANION GAP BLDV CALCULATED.4IONS-SCNC: 9 MMOL/L (ref 10–25)
BASE EXCESS BLDV CALC-SCNC: 3.6 MMOL/L (ref -2–3)
BODY TEMPERATURE: 37 DEGREES CELSIUS
CA-I BLDV-SCNC: 1.21 MMOL/L (ref 1.1–1.33)
CHLORIDE BLDV-SCNC: 101 MMOL/L (ref 98–107)
GLUCOSE BLDV-MCNC: 95 MG/DL (ref 74–99)
HCO3 BLDV-SCNC: 29.1 MMOL/L (ref 22–26)
HCT VFR BLD EST: 33 % (ref 41–52)
HGB BLDV-MCNC: 10.9 G/DL (ref 13.5–17.5)
LACTATE BLDV-SCNC: 1 MMOL/L (ref 0.4–2)
OXYHGB MFR BLDV: 71.8 % (ref 45–75)
PCO2 BLDV: 47 MM HG (ref 41–51)
PH BLDV: 7.4 PH (ref 7.33–7.43)
PO2 BLDV: 46 MM HG (ref 35–45)
POTASSIUM BLDV-SCNC: 4.3 MMOL/L (ref 3.5–5.3)
SAO2 % BLDV: 75 % (ref 45–75)
SODIUM BLDV-SCNC: 135 MMOL/L (ref 136–145)

## 2024-09-27 PROCEDURE — G0378 HOSPITAL OBSERVATION PER HR: HCPCS

## 2024-09-27 SDOH — HEALTH STABILITY: MENTAL HEALTH: IN THE PAST FEW WEEKS, HAVE YOU WISHED YOU WERE DEAD?: YES

## 2024-09-27 SDOH — HEALTH STABILITY: MENTAL HEALTH: HAVE YOU HAD THESE THOUGHTS AND HAD SOME INTENTION OF ACTING ON THEM?: YES

## 2024-09-27 SDOH — HEALTH STABILITY: MENTAL HEALTH: HAVE YOU ACTUALLY HAD ANY THOUGHTS OF KILLING YOURSELF?: YES

## 2024-09-27 SDOH — HEALTH STABILITY: MENTAL HEALTH: WAS THIS WITHIN THE PAST THREE MONTHS?: YES

## 2024-09-27 SDOH — HEALTH STABILITY: MENTAL HEALTH: BEHAVIORS/MOOD: FLAT AFFECT;HALLUCINATIONS;GUARDED;WITHDRAWN

## 2024-09-27 SDOH — HEALTH STABILITY: MENTAL HEALTH: HAVE YOU WISHED YOU WERE DEAD OR WISHED YOU COULD GO TO SLEEP AND NOT WAKE UP?: YES

## 2024-09-27 SDOH — HEALTH STABILITY: MENTAL HEALTH: BEHAVIORAL HEALTH(WDL): EXCEPTIONS TO WDL

## 2024-09-27 SDOH — HEALTH STABILITY: MENTAL HEALTH: HAVE YOU BEEN THINKING ABOUT HOW YOU MIGHT DO THIS?: YES

## 2024-09-27 SDOH — HEALTH STABILITY: MENTAL HEALTH: DELUSIONS: CONTROLLED

## 2024-09-27 SDOH — HEALTH STABILITY: MENTAL HEALTH
HAVE YOU STARTED TO WORK OUT OR WORKED OUT THE DETAILS OF HOW TO KILL YOURSELF? DO YOU INTENT TO CARRY OUT THIS PLAN?: YES

## 2024-09-27 SDOH — HEALTH STABILITY: MENTAL HEALTH: CONTENT: BLAMING SELF

## 2024-09-27 SDOH — HEALTH STABILITY: MENTAL HEALTH: HAVE YOU EVER TRIED TO KILL YOURSELF?: YES

## 2024-09-27 SDOH — HEALTH STABILITY: MENTAL HEALTH: IN THE PAST FEW WEEKS, HAVE YOU FELT THAT YOU OR YOUR FAMILY WOULD BE BETTER OFF IF YOU WERE DEAD?: YES

## 2024-09-27 SDOH — HEALTH STABILITY: MENTAL HEALTH: NEEDS EXPRESSED: DENIES

## 2024-09-27 SDOH — HEALTH STABILITY: MENTAL HEALTH

## 2024-09-27 SDOH — HEALTH STABILITY: MENTAL HEALTH: IN THE PAST WEEK, HAVE YOU BEEN HAVING THOUGHTS ABOUT KILLING YOURSELF?: YES

## 2024-09-27 SDOH — HEALTH STABILITY: MENTAL HEALTH: HALLUCINATION: AUDITORY;VISUAL

## 2024-09-27 SDOH — HEALTH STABILITY: MENTAL HEALTH: ARE YOU HAVING THOUGHTS OF KILLING YOURSELF RIGHT NOW?: YES

## 2024-09-27 SDOH — HEALTH STABILITY: MENTAL HEALTH: HAVE YOU EVER DONE ANYTHING, STARTED TO DO ANYTHING, OR PREPARED TO DO ANYTHING TO END YOUR LIFE?: YES

## 2024-09-27 SDOH — HEALTH STABILITY: MENTAL HEALTH: RISK OF SUICIDE: HIGH RISK

## 2024-09-27 ASSESSMENT — PAIN SCALES - GENERAL: PAINLEVEL_OUTOF10: 0 - NO PAIN

## 2024-09-27 NOTE — H&P
"Observation History and Physical  [facility] EMERGENCY MEDICINE           History of Present Illness     History provided by: Patient  Limitations to History: Altered Mental Status and Intoxication  External Records Reviewed: Previous notes from radiation oncologist most recently August of this year      Patient History:  Shayne Messer is a 43 y.o. male who presented to the emergency department after intentional overdose on nausea meds at home.  Patient was medically cleared as of midnight, was placed on observation for EPAT placement given the fact the patient did attempt to kill himself by taking his medications.  Patient is currently hemodynamically stable, reports a small amount of abdominal pain.    Shayne Messer was escalated to observation status. Observation was necessary as they continue to require treatment and monitoring of their psychiatric illness while awaiting inpatient behavioral health bed availability.    Physical Exam     Visit Vitals  /77 (BP Location: Right arm, Patient Position: Lying)   Pulse 66   Temp 36.6 °C (97.9 °F) (Temporal)   Resp 16   Ht 1.753 m (5' 9\")   Wt 77.1 kg (170 lb)   SpO2 95%   BMI 25.10 kg/m²   Smoking Status Every Day   BSA 1.94 m²       GENERAL:  The patient appears nourished and normally developed. Vital signs as documented.     PULMONARY:  Without any respiratory distress. Able to speak full sentences, no accessory muscle use    CARDIAC: Warm and well perfused. No cyanosis.    MUSCULOSKELETAL:   Able to ambulate, Non edematous, with no obvious deformities.     SKIN: No pallor. Intact.    NEURO:  No obvious neurological deficits.  Able to follow commands.    Psych: Withdrawn, tearful, endorses suicidal ideation, denies homicidal ideation, denies AVH      Impression and Plan     ED Course as of 09/27/24 0631   Thu Sep 26, 2024   1727 At this time patient is not a great historian in eliciting the doses of medications he took.  Chlorpromazine poses " risk of central nervous system depression, patient is currently being closely monitored, is currently on end-tidal CO2 with frequent neurochecks and awakenings.  Mirtazapine can cause mild tachycardia, hypertension, drowsiness and CNS depression in large doses.  Plan to obtain coingestion labs, treat patient with IV fluids and get EKG for interval monitoring.  Patient has no clonus or rigidity on exam. [KR]   1728 Plan to consult med tox as well as poison control [KR]   1738 Electrocardiogram, 12-lead  ECG normal sinus rhythm with sinus arrhythmia rate 89, normal axis.  No acute ST segment elevation or depression.  , QRS 90, QTc 484. [KR]   1808 Opened case with poison control, recommending approximately 4 hours of observation after Remeron ingestion and 6 hours of observation after Thorazine ingestion or until patient returns to baseline prior to medical clearance based on half-life's. [KR]   1917 Acute Toxicology Panel, Blood  No evidence of coingestions [KR]   1917 Comprehensive Metabolic Panel(!)  No electrolyte abnormalities, no renal or liver dysfunction [KR]   2148 Electrocardiogram, 12-lead  Repeat ECG showing normal sinus rhythm, right axis deviation.  No acute ST segment elevation or depression.  , QRS 90, QTc 469. [KR]   2149 Patient is having improvement in somnolence, requesting something to eat will be provided with some food. [KR]      ED Course User Index  [KR] Livia Christiansen DO         Diagnoses as of 09/27/24 0631   Polysubstance overdose, intentional self-harm, initial encounter (Multi)        Shayne Messer under observation status in [facility] EMERGENCY MEDICINE for psychiatric illness monitoring and treatment while awaiting inpatient behavioral health bed availability.   Emergency Psychiatric Assessment Team has been consulted. Case discussed with them and decision for inpatient hospitalization deemed necessary. Patient is medically clear at this time.     Home  medication reconciliation was reviewed and restarted where clinically indicated.     Patient and Family updated on plan of care.     Justo Hoffmann MD

## 2024-09-27 NOTE — DISCHARGE SUMMARY
"Observation Disposition Note  [facility] EMERGENCY MEDICINE             Subjective:       Shayne Messer has been under observation status in [facility] EMERGENCY MEDICINE for psychiatric illness monitoring and treatment while awaiting inpatient behavioral health bed availability.       Physical Exam     Visit Vitals  /65 (Patient Position: Sitting)   Pulse 80   Temp 36.6 °C (97.9 °F) (Temporal)   Resp 17   Ht 1.753 m (5' 9\")   Wt 77.1 kg (170 lb)   SpO2 98%   BMI 25.10 kg/m²   Smoking Status Every Day   BSA 1.94 m²       GENERAL:  The patient appears nourished and normally developed. Vital signs as documented.     PULMONARY:  Without any respiratory distress. Able to speak full sentences, no accessory muscle use    CARDIAC: Warm and well perfused. No cyanosis.    MUSCULOSKELETAL:   Able to ambulate, Non edematous, with no obvious deformities.     SKIN: No pallor. Intact.    NEURO:  No obvious neurological deficits.  Able to follow commands.    Psych: Depressed and continues to endorse suicidal ideation with a plan.  Denies auditory or visual hallucinations.  Denies homicidal ideations.      Impresssion and Plan     ED Course as of 09/27/24 1109   Thu Sep 26, 2024   1727 At this time patient is not a great historian in eliciting the doses of medications he took.  Chlorpromazine poses risk of central nervous system depression, patient is currently being closely monitored, is currently on end-tidal CO2 with frequent neurochecks and awakenings.  Mirtazapine can cause mild tachycardia, hypertension, drowsiness and CNS depression in large doses.  Plan to obtain coingestion labs, treat patient with IV fluids and get EKG for interval monitoring.  Patient has no clonus or rigidity on exam. [KR]   1728 Plan to consult med tox as well as poison control [KR]   1738 Electrocardiogram, 12-lead  ECG normal sinus rhythm with sinus arrhythmia rate 89, normal axis.  No acute ST segment " elevation or depression.  , QRS 90, QTc 484. [KR]   1808 Opened case with poison control, recommending approximately 4 hours of observation after Remeron ingestion and 6 hours of observation after Thorazine ingestion or until patient returns to baseline prior to medical clearance based on half-life's. [KR]   1917 Acute Toxicology Panel, Blood  No evidence of coingestions [KR]   1917 Comprehensive Metabolic Panel(!)  No electrolyte abnormalities, no renal or liver dysfunction [KR]   2148 Electrocardiogram, 12-lead  Repeat ECG showing normal sinus rhythm, right axis deviation.  No acute ST segment elevation or depression.  , QRS 90, QTc 469. [KR]   2149 Patient is having improvement in somnolence, requesting something to eat will be provided with some food. [KR]      ED Course User Index  [KR] Livai Christiansen DO         Diagnoses as of 09/27/24 1109   Polysubstance overdose, intentional self-harm, initial encounter (Multi)       In summary, Shayne Messer has been cared under observation in [facility] for psychiatric illness monitoring and treatment while awaiting inpatient behavioral health bed availability.     Emergency Psychiatric Assessment Team was consulted. Case discussed with them and decision for inpatient hospitalization deemed necessary.     Patient has been accepted at [facility]    Total length of observation was 17 hours and 45 minutes. Dr. Aditya Justin is the disposition attending.    Discharge Diagnosis  Suicidal ideation    Cooper Alcala DO

## 2024-09-27 NOTE — PROGRESS NOTES
"EPAT - Social Work Psychiatric Assessment    Arrival Details  Mode of Arrival: Ambulance  Admission Source: Home  Admission Type: Involuntary  EPAT Assessment Start Date: 09/26/24  EPAT Assessment Start Time: 2000  Name of : Leyla JIN    History of Present Illness    Admission Reason: Evaluation    HPI:   Patient is a 43 year old male admitted to ED for intentional OD on medications. Patient has a history of oral cancer, on methadone for the chronic pain, history of AUGUST, alcohol, cocaine abuse, history of Affective Psychosis Bipolar, MDD, AUGUST, Schizoaffective Disorder. Patient called his SW who he is engaged with through [facility] at the [facility] and told her he OD on his medications, 911 was contacted, EMS transported patient to ED. Provider note and chart history reviewed. Patient displays flat affect, resistant and guarded at times, irritable, complaining of mouth pain. Patient admits to feeling suicidal, \"I have nothing to live for, my mouth is always in pain, I am losing my apartment, there is no reason to live, I have no one and nothing in my life\". Patient is chronically unemployed. Patient is currently residing in [employer] housing, pays $25 per month for rent and is unable to pay his rent. SW through supportive oncology is assisting patient with resources for housing. Patient identifies to feeling hopeless, helpless, worthless active SI. Patient denies HI,AH,VH. attempted to contact emergency contact  Chitra Appiah mother, [phone] unsuccessful.    SW Readmission Information   Readmission within 30 Days: No    Psychiatric Symptoms  Anxiety Symptoms: Generalized  Depression Symptoms: Feelings of helplessness, Feelings of hopelessess, Feelings of worthlessness, Loss of interest, Isolative  Elizabeth Symptoms: No problems reported or observed.    Psychosis Symptoms  Hallucination Type: No problems reported or observed.  Delusion Type: No problems reported or observed.    Additional " Symptoms - Adult  Generalized Anxiety Disorder: Difficulity concentrating, Excessive anxiety/worry, Restlessness  Obsessive Compulsive Disorder: No problems reported or observed.  Panic Attack: No problems reported or observed.  Post Traumatic Stress Disorder: No problems reported or observed.  Delirium: No problems reported or observed., Other (Comment)    Past Psychiatric History/Meds/Treatments  Past Psychiatric History: AUGUST, Affective Psychosis Bipolar, MDD, Schizoaffective Disorder  Past Psychiatric Meds/Treatments: thorazine 50 mg tablets, take 4(200mg) nightly,flexeril 10mg, methadone 5 mg tablets, 3 tabs(15mg) every 8 hours, zoloft 50 mg x 1 daily,  Past Violence/Victimization History: denies    Current Mental Health Contacts   Name/Phone Number: [facility]   Last Appointment Date: 9/26/2024  Provider Name/Phone Number: [facility]  Provider Last Appointment Date: 9/25/2024    Support System:  (patient clarenetta he has no sober peer support, no family support)    Living Arrangement: Apartment    Home Safety  Feels Safe Living in Home: Yes  Home Safety : patient overdosed in home    Income Information  Employment Status for: Patient  Employment Status: Unemployed  Income Source: Unemployed  Current/Previous Occupation: Service Industry  Financial Concerns: Unable to Pay Bills, Unable to Meet Basic Needs    [place] Service/Education History  Current or Previous  Service: None  Education Level: Less than high school  History of Learning Problems: No  History of School Behavior Problems: No  School History: patient does not want to answer    Social/Cultural History    Social History:   patient was raised in [place] by both parents, has 1 sister . Patient declined any further information other than he has not been in contact with any family members and has no sober peer support in the community.  Important Activities:  Other (Comment) (patient denies has has any hobbies or interests)    Legal  Legal Considerations: Patient/ Family Ability to Make Healthcare Decisions  Legal Concerns: none    Drug Screening  Have you used any substances (canabis, cocaine, heroin, hallucinogens, inhalants, etc.) in the past 12 months?: Yes  Have you used any prescription drugs other than prescribed in the past 12 months?: No  Is a toxicology screen needed?: Yes    Stage of Change  Stage of Change: Precontemplation  Duration of Substance Use: over 12 months  Frequency of Substance Use: daily  Age of First Substance Use: 15    Behavioral Health  Behavioral Health(WDL): Within Defined Limits  Behaviors/Mood: Flat affect, Guarded  Affect: Appropriate to circumstances    Orientation  Orientation Level: Oriented X4    General Appearance  Motor Activity: Unremarkable  Speech Pattern: Other (Comment) (unremarkable)  General Attitude: Guarded  Appearance/Hygiene: Unremarkable    Thought Process  Coherency: Dixon thinking  Content: Unremarkable  Delusions: Other (Comment) (none noted or observed)  Perception: Not altered  Hallucination: None  Judgment/Insight: Impaired  Confusion: None  Cognition: Impulsive    Sleep Pattern  Sleep Pattern: Unable to assess    Risk Factors  Self Harm/Suicidal Ideation Plan: yes  Previous Self Harm/Suicidal Plans: yes  Risk Factors: Lower socioeconomic status, Major mental illness, Male, Substance abuse, Unemployment    Violence Risk Assessment  Assessment of Violence: None noted  Thoughts of Harm to Others: No    Ability to Assess Risk Screen  Risk Screen - Ability to Assess: Able to be screened  Ask Suicide-Screening Questions  1. In the past few weeks, have you wished you were dead?: Yes  2. In the past few weeks, have you felt that you or your family would be better off if you were dead?: Yes  3. In the past week, have you been having thoughts about killing yourself?: Yes  4. Have you ever tried to kill yourself?:  Yes  How did you try to kill yourself?: yes  When did you try to kill yourself?: today  5. Are you having thoughts of killing yourself right now?: Yes  Calculated Risk Score: Imminent Risk  [place] Suicide Severity Rating Scale (Screener/Recent Self-Report)  1. Wish to be Dead (Past 1 Month): Yes  2. Non-Specific Active Suicidal Thoughts (Past 1 Month): Yes  3. Active Suicidal Ideation with any Methods (Not Plan) Without Intent to Act (Past 1 Month): No  4. Active Suicidal Ideation with Some Intent to Act, Without Specific Plan (Past 1 Month): Yes  5. Active Suicidal Ideation with Specific Plan and Intent (Past 1 Month): Yes  6. Suicidal Behavior (Lifetime): Yes  6. Suicidal Behavior (3 Months): No  6. Suicidal Behavior (Description): OD on medication  Calculated C-SSRS Risk Score (Lifetime/Recent): High Risk  Step 1: Risk Factors  Current & Past Psychiatric Dx: Mood disorder, Psychotic disorder, Alcohol/substance abuse disorders  Presenting Symptoms: Impulsivity, Hopelessness or despair  Family History:  (denies)  Precipitants/Stressors: Chronic physical pain or other acute medical problem (e.g. CNS disorders), Substance intoxication or withdrawal  Access to Lethal Methods : No  Step 2: Protective Factors   Protective Factors Internal: Other (Comment) (none identified)  Protective Factors External: Beloved pets  Step 3: Suicidal Ideation Intensity  How Many Times Have You Had These Thoughts: Less than once a week  When You Have the Thoughts How Long do They Last : Less than 1 hour/some of the time  Could/Can You Stop Thinking About Killing Yourself or Wanting to Die if You Want to: Can control thoughts with a lot of difficulty  Are There Things - Anyone or Anything - That Stopped You From Wanting to Die or Acting on: Uncertain that deterrents stopped you  What Sort of Reasons Did You Have For Thinking About Wanting to Die or Killing Yourself: Mostly to end or stop the pain (you couldn't go on living with the pain  "or how you were feeling)  Total Score: 14  Step 5: Documentation  Risk Level: High suicide risk    Psychiatric Impression and Plan of Care    Assessment and Plan:   Patient is a 43 year old male admitted to ED for suicide attempt by OD on medications. Patient contacted his  in the community after taking his medications. Patient has a history of affective psychosis bipolar, MDD, schizoaffective disorder, AUGUST. Patient was diagnoses with mouth cancer x 12 months ago, completed [place], continues to be followed by [facility]. Patient tested positive for cocaine and methadone. Patient has a prescription of methadone for chronic pain from mouth cancer. Chart history reflect patient has an appointment with Supportive Oncology on 9/25/2024 at which time patient was told due to his continued daily cocaine use, his methadone with be decreased until he is no longer on methadone due to his daily substance abuse. Patient has a past history of OD on medications. Patient cannot identify family or sober peer support in the community. Collateral information received by mother  Chitra [phone], \"Shayne (patient) was doing really good about a year and a half ago, he was in treatment and sober. Then he got diagnosed with mouth cancer and could not keep his job because of the chemo treatments. Once he left sober living he relapsed\". Patient identified to feelings of hopelessness, helplessness, and worthlessness, endorses active SI, no current plan, C-SSRS indicate high risk,  denies HI,AH,VH. Patient in need of HL OC at this time, discussed plan of care with  Dr Kuldip FREITAS who concurs.        AUGUST  Affective psychosis bipolar  MDD  Schizoaffective Disorder  Outcome/Disposition  Assessment, Recommendations and Risk Level Reviewed with: Livia Christiansen DO  EPAT Assessment Completed Date: 09/26/24  EPAT Assessment Completed Time: 2300      "

## 2024-09-27 NOTE — H&P
"Observation History and Physical  [facility] EMERGENCY MEDICINE           History of Present Illness     History provided by: Patient and EMS  Limitations to History: Intoxication  External Records Reviewed:       Patient History:  Shayne Messer is a 43 y.o. male who presented to the emergency department after intentional overdose of prescription medication. Now medically cleared, placed under psychiatric observation due to suicide attempt.    Shayne Messer was escalated to observation status. Observation was necessary as they continue to require treatment and monitoring of their psychiatric illness while awaiting inpatient behavioral health bed availability.    Physical Exam     Visit Vitals  /84   Pulse 66   Temp 36.6 °C (97.9 °F) (Oral)   Resp 14   Ht 1.753 m (5' 9\")   Wt 77.1 kg (170 lb)   SpO2 94%   BMI 25.10 kg/m²   Smoking Status Every Day   BSA 1.94 m²       GENERAL:  The patient appears nourished and normally developed. Vital signs as documented.     PULMONARY:  Without any respiratory distress. Able to speak full sentences, no accessory muscle use    CARDIAC: Warm and well perfused. No cyanosis.    MUSCULOSKELETAL:   Able to ambulate, Non edematous, with no obvious deformities.     SKIN: No pallor. Intact.    NEURO:  No obvious neurological deficits.  Able to follow commands.    Psych: depressed, withdrawn, endorses suicidal ideation with plan, denies HI, BENJI      Impression and Plan     ED Course as of 09/27/24 0200   Thu Sep 26, 2024   1727 At this time patient is not a great historian in eliciting the doses of medications he took.  Chlorpromazine poses risk of central nervous system depression, patient is currently being closely monitored, is currently on end-tidal CO2 with frequent neurochecks and awakenings.  Mirtazapine can cause mild tachycardia, hypertension, drowsiness and CNS depression in large doses.  Plan to obtain coingestion labs, treat patient with IV fluids and get " EKG for interval monitoring.  Patient has no clonus or rigidity on exam. [KR]   1728 Plan to consult med tox as well as poison control [KR]   1738 Electrocardiogram, 12-lead  ECG normal sinus rhythm with sinus arrhythmia rate 89, normal axis.  No acute ST segment elevation or depression.  , QRS 90, QTc 484. [KR]   1808 Opened case with poison control, recommending approximately 4 hours of observation after Remeron ingestion and 6 hours of observation after Thorazine ingestion or until patient returns to baseline prior to medical clearance based on half-life's. [KR]   1917 Acute Toxicology Panel, Blood  No evidence of coingestions [KR]   1917 Comprehensive Metabolic Panel(!)  No electrolyte abnormalities, no renal or liver dysfunction [KR]   2148 Electrocardiogram, 12-lead  Repeat ECG showing normal sinus rhythm, right axis deviation.  No acute ST segment elevation or depression.  , QRS 90, QTc 469. [KR]   2149 Patient is having improvement in somnolence, requesting something to eat will be provided with some food. [KR]      ED Course User Index  [KR] Livia Christiansen,          Diagnoses as of 09/27/24 0200   Polysubstance overdose, intentional self-harm, initial encounter (Multi)        Shayne Messer under observation status in [facility] EMERGENCY MEDICINE for psychiatric illness monitoring and treatment while awaiting inpatient behavioral health bed availability.   Emergency Psychiatric Assessment Team has been consulted. Case discussed with them and decision for inpatient hospitalization deemed necessary. Patient is medically clear at this time.     Home medication reconciliation was reviewed and restarted where clinically indicated.     Patient and Family updated on plan of care.     Justo Hoffmann MD

## 2024-09-27 NOTE — SIGNIFICANT EVENT
Application for Emergency Admission      Ready for Transfer?  Is the patient medically cleared for transfer to inpatient psychiatry: Yes  Has the patient been accepted to an inpatient psychiatric hospital: Yes    Application for Emergency Admission  IN ACCORDANCE WITH [place]122[place]  The Chief Clinical Officer of: [place] 9/27/2024 .4:17 AM    Reason for Hospitalization  The undersigned has reason to believe that: Shayne Messer Is a mentally ill person subject to hospitalization by court order under division B Section 5122.01 of the Revised Code, i.e., this person:    1.Yes  Represents a substantial risk of physical harm to self as manifested by evidence of threats of, or attempts at, suicide or serious self-inflicted bodily harm    2.No Represents a substantial risk of physical harm to others as manifested by evidence of recent homicidal or other violent behavior, evidence of recent threats that place another in reasonable fear of violent behavior and serious physical harm, or other evidence of present dangerousness    3.No Represents a substantial and immediate risk of serious physical impairment or injury to self as manifested by  evidence that the person is unable to provide for and is not providing for the person's basic physical needs because of the person's mental illness and that appropriate provision for those needs cannot be made  immediately available in the community    4.Yes Would benefit from treatment in a hospital for his mental illness and is in need of such treatment as manifested by evidence of behavior that creates a grave and imminent risk to substantial rights of others or  himself.    5.Yes Would benefit from treatment as manifested by evidence of behavior that indicates all of the following:       (a) The person is unlikely to survive safely in the community without supervision, based on a clinical determination.       (b) The person has a history of lack of compliance with  treatment for mental illness and one of the following applies:      (i) At least twice within the thirty-six months prior to the filing of an affidavit seeking court-ordered treatment of the person under section 5122.111 of the Revised Code, the lack of compliance has been a significant factor in necessitating hospitalization in a hospital or receipt of services in a forensic or other mental health unit of a correctional facility, provided that the thirty-six-month period shall be extended by the length of any hospitalization or incarceration of the person that occurred within the thirty-six-month period.      (ii) Within the forty-eight months prior to the filing of an affidavit seeking court-ordered treatment of the person under section 5122.111 of the Revised Code, the lack of compliance resulted in one or more acts of serious violent behavior toward self or others or threats of, or attempts at, serious physical harm to self or others, provided that the forty-eight-month period shall be extended by the length of any hospitalization or incarceration of the person that occurred within the forty-eight-month period.      (c) The person, as a result of mental illness, is unlikely to voluntarily participate in necessary treatment.       (d) In view of the person's treatment history and current behavior, the person is in need of treatment in order to prevent a relapse or deterioration that would be likely to result in substantial risk of serious harm to the person or others.    (e) Represents a substantial risk of physical harm to self or others if allowed to remain at liberty pending examination.    Therefore, it is requested that said person be admitted to the above named facility.    STATEMENT OF BELIEF    Must be filled out by one of the following: a psychiatrist, licensed physician, licensed clinical psychologist, health or ,  or .  (Statement shall include the circumstances under  which the individual was taken into custody and the reason for the person's belief that hospitalization is necessary. The statement shall also include a reference to efforts made to secure the individual's property at his residence if he was taken into custody there. Every reasonable and appropriate effort should be made to take this person into custody in the least conspicuous manner possible.)    Attempted to overdose on nausea meds at home, medically cleared but would benefit from inpatient treatment     Justo Hoffmann MD 9/27/2024     _____________________________________________________________   Place of Employment: [employer]    STATEMENT OF OBSERVATION BY PSYCHIATRIST, LICENSED PHYSICIAN, OR LICENSED CLINICAL PSYCHOLOGIST, IF APPLICABLE    Place of Observation (e.g., [facility] mental [facility], general hospital, office, emergency facility)  (If applicable, please complete)    Justo Hoffmann MD 9/27/2024    _____________________________________________________________

## 2024-09-28 LAB
ATRIAL RATE: 75 BPM
BZE UR-MCNC: 585 NG/ML
P OFFSET: 189 MS
P ONSET: 132 MS
PR INTERVAL: 166 MS
Q ONSET: 215 MS
QRS COUNT: 12 BEATS
QRS DURATION: 90 MS
QT INTERVAL: 420 MS
QTC CALCULATION(BAZETT): 469 MS
QTC FREDERICIA: 452 MS
R AXIS: 170 DEGREES
T AXIS: 119 DEGREES
T OFFSET: 425 MS
VENTRICULAR RATE: 75 BPM

## 2024-09-30 ENCOUNTER — SOCIAL WORK (OUTPATIENT)
Dept: CASE MANAGEMENT | Facility: HOSPITAL | Age: 44
End: 2024-09-30
Payer: MEDICAID

## 2024-09-30 NOTE — PROGRESS NOTES
JUNI called [place] to connect with /counselor in regard to update them on Shayne's housing situation. No answer, left vm to return call. Will continue to follow.

## 2024-10-01 LAB
EDDP UR CFM-MCNC: >1000 NG/ML
METHADONE UR CFM-MCNC: >1000 NG/ML

## 2024-10-15 ENCOUNTER — TELEPHONE (OUTPATIENT)
Dept: BEHAVIORAL HEALTH | Facility: HOSPITAL | Age: 44
End: 2024-10-15

## 2024-10-15 DIAGNOSIS — Z51.5 ENCOUNTER FOR PALLIATIVE CARE: ICD-10-CM

## 2024-10-15 DIAGNOSIS — Z51.81 ENCOUNTER FOR MONITORING OPIOID MAINTENANCE THERAPY: Primary | ICD-10-CM

## 2024-10-15 DIAGNOSIS — Z79.891 ENCOUNTER FOR MONITORING OPIOID MAINTENANCE THERAPY: Primary | ICD-10-CM

## 2024-10-15 DIAGNOSIS — G89.3 NEOPLASM RELATED PAIN: ICD-10-CM

## 2024-11-01 ENCOUNTER — HOSPITAL ENCOUNTER (OUTPATIENT)
Facility: HOSPITAL | Age: 44
Setting detail: OBSERVATION
Discharge: OTHER INSTITUTION | End: 2024-11-02
Attending: EMERGENCY MEDICINE | Admitting: EMERGENCY MEDICINE
Payer: MEDICAID

## 2024-11-01 DIAGNOSIS — T50.902A INTENTIONAL DRUG OVERDOSE, INITIAL ENCOUNTER (MULTI): Primary | ICD-10-CM

## 2024-11-01 LAB
ALBUMIN SERPL BCP-MCNC: 4.6 G/DL (ref 3.4–5)
ALP SERPL-CCNC: 70 U/L (ref 33–120)
ALT SERPL W P-5'-P-CCNC: 17 U/L (ref 10–52)
AMPHETAMINES UR QL SCN: ABNORMAL
ANION GAP SERPL CALC-SCNC: 16 MMOL/L (ref 10–20)
APAP SERPL-MCNC: <10 UG/ML
APPEARANCE UR: ABNORMAL
AST SERPL W P-5'-P-CCNC: 16 U/L (ref 9–39)
BARBITURATES UR QL SCN: ABNORMAL
BASOPHILS # BLD AUTO: 0.04 X10*3/UL (ref 0–0.1)
BASOPHILS NFR BLD AUTO: 0.6 %
BENZODIAZ UR QL SCN: ABNORMAL
BILIRUB SERPL-MCNC: 0.3 MG/DL (ref 0–1.2)
BILIRUB UR STRIP.AUTO-MCNC: NEGATIVE MG/DL
BUN SERPL-MCNC: 13 MG/DL (ref 6–23)
BZE UR QL SCN: ABNORMAL
CALCIUM SERPL-MCNC: 9.3 MG/DL (ref 8.6–10.6)
CANNABINOIDS UR QL SCN: ABNORMAL
CHLORIDE SERPL-SCNC: 99 MMOL/L (ref 98–107)
CO2 SERPL-SCNC: 29 MMOL/L (ref 21–32)
COLOR UR: YELLOW
CREAT SERPL-MCNC: 0.96 MG/DL (ref 0.5–1.3)
EGFRCR SERPLBLD CKD-EPI 2021: >90 ML/MIN/1.73M*2
EOSINOPHIL # BLD AUTO: 0.1 X10*3/UL (ref 0–0.7)
EOSINOPHIL NFR BLD AUTO: 1.4 %
ERYTHROCYTE [DISTWIDTH] IN BLOOD BY AUTOMATED COUNT: 12.2 % (ref 11.5–14.5)
ETHANOL SERPL-MCNC: 71 MG/DL
FENTANYL+NORFENTANYL UR QL SCN: ABNORMAL
GLUCOSE SERPL-MCNC: 96 MG/DL (ref 74–99)
GLUCOSE UR STRIP.AUTO-MCNC: NORMAL MG/DL
HCT VFR BLD AUTO: 39.4 % (ref 41–52)
HGB BLD-MCNC: 13.9 G/DL (ref 13.5–17.5)
IMM GRANULOCYTES # BLD AUTO: 0.04 X10*3/UL (ref 0–0.7)
IMM GRANULOCYTES NFR BLD AUTO: 0.6 % (ref 0–0.9)
KETONES UR STRIP.AUTO-MCNC: NEGATIVE MG/DL
LEUKOCYTE ESTERASE UR QL STRIP.AUTO: NEGATIVE
LYMPHOCYTES # BLD AUTO: 1.07 X10*3/UL (ref 1.2–4.8)
LYMPHOCYTES NFR BLD AUTO: 15.1 %
MAGNESIUM SERPL-MCNC: 2.18 MG/DL (ref 1.6–2.4)
MCH RBC QN AUTO: 30.8 PG (ref 26–34)
MCHC RBC AUTO-ENTMCNC: 35.3 G/DL (ref 32–36)
MCV RBC AUTO: 87 FL (ref 80–100)
METHADONE UR QL SCN: ABNORMAL
MONOCYTES # BLD AUTO: 0.55 X10*3/UL (ref 0.1–1)
MONOCYTES NFR BLD AUTO: 7.8 %
NEUTROPHILS # BLD AUTO: 5.27 X10*3/UL (ref 1.2–7.7)
NEUTROPHILS NFR BLD AUTO: 74.5 %
NITRITE UR QL STRIP.AUTO: NEGATIVE
NRBC BLD-RTO: 0 /100 WBCS (ref 0–0)
OPIATES UR QL SCN: ABNORMAL
OXYCODONE+OXYMORPHONE UR QL SCN: ABNORMAL
PCP UR QL SCN: ABNORMAL
PH UR STRIP.AUTO: 7 [PH]
PLATELET # BLD AUTO: 297 X10*3/UL (ref 150–450)
POTASSIUM SERPL-SCNC: 4.1 MMOL/L (ref 3.5–5.3)
PROT SERPL-MCNC: 7.4 G/DL (ref 6.4–8.2)
PROT UR STRIP.AUTO-MCNC: NEGATIVE MG/DL
RBC # BLD AUTO: 4.51 X10*6/UL (ref 4.5–5.9)
RBC # UR STRIP.AUTO: NEGATIVE /UL
SALICYLATES SERPL-MCNC: <3 MG/DL
SODIUM SERPL-SCNC: 140 MMOL/L (ref 136–145)
SP GR UR STRIP.AUTO: 1.01
UROBILINOGEN UR STRIP.AUTO-MCNC: NORMAL MG/DL
WBC # BLD AUTO: 7.1 X10*3/UL (ref 4.4–11.3)

## 2024-11-01 PROCEDURE — 82550 ASSAY OF CK (CPK): CPT | Performed by: EMERGENCY MEDICINE

## 2024-11-01 PROCEDURE — 84132 ASSAY OF SERUM POTASSIUM: CPT | Mod: 59

## 2024-11-01 PROCEDURE — 80053 COMPREHEN METABOLIC PANEL: CPT

## 2024-11-01 PROCEDURE — 80143 DRUG ASSAY ACETAMINOPHEN: CPT

## 2024-11-01 PROCEDURE — 80307 DRUG TEST PRSMV CHEM ANLYZR: CPT | Performed by: EMERGENCY MEDICINE

## 2024-11-01 PROCEDURE — 83735 ASSAY OF MAGNESIUM: CPT

## 2024-11-01 PROCEDURE — 81003 URINALYSIS AUTO W/O SCOPE: CPT

## 2024-11-01 PROCEDURE — 99285 EMERGENCY DEPT VISIT HI MDM: CPT

## 2024-11-01 PROCEDURE — 85025 COMPLETE CBC W/AUTO DIFF WBC: CPT

## 2024-11-01 PROCEDURE — 99285 EMERGENCY DEPT VISIT HI MDM: CPT | Performed by: EMERGENCY MEDICINE

## 2024-11-01 PROCEDURE — 80307 DRUG TEST PRSMV CHEM ANLYZR: CPT

## 2024-11-01 PROCEDURE — 36415 COLL VENOUS BLD VENIPUNCTURE: CPT

## 2024-11-01 PROCEDURE — 81003 URINALYSIS AUTO W/O SCOPE: CPT | Mod: 59 | Performed by: EMERGENCY MEDICINE

## 2024-11-01 ASSESSMENT — LIFESTYLE VARIABLES
EVER HAD A DRINK FIRST THING IN THE MORNING TO STEADY YOUR NERVES TO GET RID OF A HANGOVER: NO
TOTAL SCORE: 0
HAVE PEOPLE ANNOYED YOU BY CRITICIZING YOUR DRINKING: NO
HAVE YOU EVER FELT YOU SHOULD CUT DOWN ON YOUR DRINKING: NO
EVER FELT BAD OR GUILTY ABOUT YOUR DRINKING: NO

## 2024-11-01 ASSESSMENT — COLUMBIA-SUICIDE SEVERITY RATING SCALE - C-SSRS
2. HAVE YOU ACTUALLY HAD ANY THOUGHTS OF KILLING YOURSELF?: YES
5. HAVE YOU STARTED TO WORK OUT OR WORKED OUT THE DETAILS OF HOW TO KILL YOURSELF? DO YOU INTEND TO CARRY OUT THIS PLAN?: YES
6. HAVE YOU EVER DONE ANYTHING, STARTED TO DO ANYTHING, OR PREPARED TO DO ANYTHING TO END YOUR LIFE?: YES
6. HAVE YOU EVER DONE ANYTHING, STARTED TO DO ANYTHING, OR PREPARED TO DO ANYTHING TO END YOUR LIFE?: YES
1. IN THE PAST MONTH, HAVE YOU WISHED YOU WERE DEAD OR WISHED YOU COULD GO TO SLEEP AND NOT WAKE UP?: YES
4. HAVE YOU HAD THESE THOUGHTS AND HAD SOME INTENTION OF ACTING ON THEM?: YES

## 2024-11-02 VITALS
SYSTOLIC BLOOD PRESSURE: 134 MMHG | OXYGEN SATURATION: 100 % | TEMPERATURE: 98.3 F | HEART RATE: 80 BPM | RESPIRATION RATE: 18 BRPM | DIASTOLIC BLOOD PRESSURE: 83 MMHG

## 2024-11-02 LAB
CK SERPL-CCNC: 102 U/L (ref 0–325)
HOLD SPECIMEN: NORMAL
HOLD SPECIMEN: NORMAL

## 2024-11-02 PROCEDURE — G0378 HOSPITAL OBSERVATION PER HR: HCPCS

## 2024-11-02 PROCEDURE — 2500000001 HC RX 250 WO HCPCS SELF ADMINISTERED DRUGS (ALT 637 FOR MEDICARE OP): Mod: SE

## 2024-11-02 RX ORDER — IBUPROFEN 400 MG/1
400 TABLET ORAL ONCE
Status: COMPLETED | OUTPATIENT
Start: 2024-11-02 | End: 2024-11-02

## 2024-11-02 RX ORDER — MICONAZOLE NITRATE 2 %
2 CREAM (GRAM) TOPICAL EVERY 2 HOUR PRN
COMMUNITY

## 2024-11-02 RX ORDER — ACETAMINOPHEN 325 MG/1
975 TABLET ORAL ONCE
Status: COMPLETED | OUTPATIENT
Start: 2024-11-02 | End: 2024-11-02

## 2024-11-02 RX ORDER — CHLORPROMAZINE HYDROCHLORIDE 100 MG/1
4 TABLET, FILM COATED ORAL NIGHTLY
COMMUNITY

## 2024-11-02 RX ORDER — CHLORPROMAZINE HYDROCHLORIDE 50 MG/1
TABLET, FILM COATED ORAL EVERY MORNING
COMMUNITY

## 2024-11-02 RX ORDER — SERTRALINE HYDROCHLORIDE 50 MG/1
50 TABLET, FILM COATED ORAL EVERY MORNING
COMMUNITY

## 2024-11-02 RX ORDER — OMEPRAZOLE 40 MG/1
40 CAPSULE, DELAYED RELEASE ORAL DAILY
COMMUNITY

## 2024-11-02 RX ORDER — LISINOPRIL 20 MG/1
20 TABLET ORAL EVERY MORNING
COMMUNITY

## 2024-11-02 RX ADMIN — IBUPROFEN 400 MG: 400 TABLET ORAL at 11:25

## 2024-11-02 RX ADMIN — ACETAMINOPHEN 975 MG: 325 TABLET ORAL at 08:29

## 2024-11-02 SDOH — HEALTH STABILITY: MENTAL HEALTH

## 2024-11-02 SDOH — HEALTH STABILITY: MENTAL HEALTH: BEHAVIORS/MOOD: ANXIOUS;RESTLESS;HOSTILE

## 2024-11-02 SDOH — HEALTH STABILITY: MENTAL HEALTH: WISH TO BE DEAD (PAST 1 MONTH): YES

## 2024-11-02 SDOH — HEALTH STABILITY: MENTAL HEALTH: HAVE YOU ACTUALLY HAD ANY THOUGHTS OF KILLING YOURSELF?: YES

## 2024-11-02 SDOH — HEALTH STABILITY: MENTAL HEALTH: NON-SPECIFIC ACTIVE SUICIDAL THOUGHTS (PAST 1 MONTH): YES

## 2024-11-02 SDOH — HEALTH STABILITY: MENTAL HEALTH: IN THE PAST FEW WEEKS, HAVE YOU FELT THAT YOU OR YOUR FAMILY WOULD BE BETTER OFF IF YOU WERE DEAD?: YES

## 2024-11-02 SDOH — HEALTH STABILITY: MENTAL HEALTH: HAVE YOU BEEN THINKING ABOUT HOW YOU MIGHT DO THIS?: YES

## 2024-11-02 SDOH — HEALTH STABILITY: MENTAL HEALTH: IN THE PAST WEEK, HAVE YOU BEEN HAVING THOUGHTS ABOUT KILLING YOURSELF?: YES

## 2024-11-02 SDOH — HEALTH STABILITY: MENTAL HEALTH: HAVE YOU EVER DONE ANYTHING, STARTED TO DO ANYTHING, OR PREPARED TO DO ANYTHING TO END YOUR LIFE?: YES

## 2024-11-02 SDOH — HEALTH STABILITY: MENTAL HEALTH: IN THE PAST FEW WEEKS, HAVE YOU WISHED YOU WERE DEAD?: YES

## 2024-11-02 SDOH — HEALTH STABILITY: MENTAL HEALTH: HOW DID YOU TRY TO KILL YOURSELF?: OD

## 2024-11-02 SDOH — HEALTH STABILITY: MENTAL HEALTH: SUICIDAL BEHAVIOR (3 MONTHS): YES

## 2024-11-02 SDOH — HEALTH STABILITY: MENTAL HEALTH: HAVE YOU WISHED YOU WERE DEAD OR WISHED YOU COULD GO TO SLEEP AND NOT WAKE UP?: YES

## 2024-11-02 SDOH — HEALTH STABILITY: MENTAL HEALTH: ARE YOU HAVING THOUGHTS OF KILLING YOURSELF RIGHT NOW?: YES

## 2024-11-02 SDOH — HEALTH STABILITY: MENTAL HEALTH: DEPRESSION SYMPTOMS: CHANGE IN ENERGY LEVEL;FEELINGS OF HELPLESSNESS;ISOLATIVE;INCREASED IRRITABILITY

## 2024-11-02 SDOH — HEALTH STABILITY: MENTAL HEALTH: ACTIVE SUICIDAL IDEATION WITH SOME INTENT TO ACT, WITHOUT SPECIFIC PLAN (PAST 1 MONTH): YES

## 2024-11-02 SDOH — HEALTH STABILITY: MENTAL HEALTH: WAS THIS WITHIN THE PAST THREE MONTHS?: YES

## 2024-11-02 SDOH — HEALTH STABILITY: MENTAL HEALTH: ACTIVE SUICIDAL IDEATION WITH SPECIFIC PLAN AND INTENT (PAST 1 MONTH): YES

## 2024-11-02 SDOH — HEALTH STABILITY: MENTAL HEALTH: HAVE YOU EVER TRIED TO KILL YOURSELF?: YES

## 2024-11-02 SDOH — HEALTH STABILITY: MENTAL HEALTH: IN THE PAST FEW WEEKS, HAVE YOU WISHED YOU WERE DEAD?: NO

## 2024-11-02 SDOH — HEALTH STABILITY: MENTAL HEALTH: HAVE YOU HAD THESE THOUGHTS AND HAD SOME INTENTION OF ACTING ON THEM?: YES

## 2024-11-02 SDOH — HEALTH STABILITY: MENTAL HEALTH: SLEEP PATTERN: DISTURBED/INTERRUPTED SLEEP

## 2024-11-02 SDOH — HEALTH STABILITY: MENTAL HEALTH: SUICIDE ASSESSMENT: ADULT (C-SSRS)

## 2024-11-02 SDOH — HEALTH STABILITY: MENTAL HEALTH: ANXIETY SYMPTOMS: GENERALIZED

## 2024-11-02 SDOH — HEALTH STABILITY: MENTAL HEALTH: SUICIDAL BEHAVIOR (LIFETIME): YES

## 2024-11-02 SDOH — HEALTH STABILITY: MENTAL HEALTH: BEHAVIORAL HEALTH(WDL): EXCEPTIONS TO WDL

## 2024-11-02 ASSESSMENT — LIFESTYLE VARIABLES: SUBSTANCE_ABUSE_PAST_12_MONTHS: YES

## 2024-11-02 ASSESSMENT — PAIN SCALES - WONG BAKER: WONGBAKER_NUMERICALRESPONSE: HURTS LITTLE MORE

## 2024-11-04 LAB
ANION GAP BLDV CALCULATED.4IONS-SCNC: 9 MMOL/L (ref 10–25)
BASE EXCESS BLDV CALC-SCNC: 6 MMOL/L (ref -2–3)
BODY TEMPERATURE: 37 DEGREES CELSIUS
CA-I BLDV-SCNC: 1.18 MMOL/L (ref 1.1–1.33)
CHLORIDE BLDV-SCNC: 102 MMOL/L (ref 98–107)
GLUCOSE BLDV-MCNC: 111 MG/DL (ref 74–99)
HCO3 BLDV-SCNC: 31.2 MMOL/L (ref 22–26)
HCT VFR BLD EST: 43 % (ref 41–52)
HGB BLDV-MCNC: 14.4 G/DL (ref 13.5–17.5)
INHALED O2 CONCENTRATION: 21 %
LACTATE BLDV-SCNC: 2.5 MMOL/L (ref 0.4–2)
OXYHGB MFR BLDV: 40.1 % (ref 45–75)
PCO2 BLDV: 46 MM HG (ref 41–51)
PH BLDV: 7.44 PH (ref 7.33–7.43)
PO2 BLDV: 29 MM HG (ref 35–45)
POTASSIUM BLDV-SCNC: 4.1 MMOL/L (ref 3.5–5.3)
SAO2 % BLDV: 42 % (ref 45–75)
SODIUM BLDV-SCNC: 138 MMOL/L (ref 136–145)

## 2024-11-13 ENCOUNTER — NUTRITION (OUTPATIENT)
Dept: HEMATOLOGY/ONCOLOGY | Facility: HOSPITAL | Age: 44
End: 2024-11-13
Payer: MEDICAID

## 2024-11-13 ENCOUNTER — PATIENT OUTREACH (OUTPATIENT)
Dept: CARE COORDINATION | Facility: CLINIC | Age: 44
End: 2024-11-13
Payer: MEDICAID

## 2024-11-13 NOTE — PROGRESS NOTES
NUTRITION COMMUNICATION NOTE    Shayne Messer     REASON FOR COMMUNICATION: Nutrition Supplements    Pt left message for re-order of Ensure Plus  Tensed called   Was ordered in ensure plus vanilla   CMN  Ensure Plus   5 per day    They were able to place and ship order  Will call patient to make him aware

## 2024-11-13 NOTE — PROGRESS NOTES
Pt. Identified for post discharge services. Initial outreach call to introduce self, available services, and assess needs.  Unable to LVM due to voicemail being full.  Will re-attempt outreach.   VIVIANA Chiang  [facility]    Contact: [phone]

## 2024-11-15 ENCOUNTER — NUTRITION (OUTPATIENT)
Dept: RADIATION ONCOLOGY | Facility: CLINIC | Age: 44
End: 2024-11-15
Payer: MEDICAID

## 2024-11-20 ENCOUNTER — PATIENT OUTREACH (OUTPATIENT)
Dept: CARE COORDINATION | Facility: CLINIC | Age: 44
End: 2024-11-20
Payer: MEDICAID

## 2024-11-20 NOTE — PROGRESS NOTES
Pt. Identified for post discharge services. Final outreach call to introduce self, available services, and assess needs.  Voicemail message left with my contact information.   VIVIANA Chiang  [facility]    Contact: [phone]

## 2024-12-04 ENCOUNTER — APPOINTMENT (OUTPATIENT)
Dept: BEHAVIORAL HEALTH | Facility: HOSPITAL | Age: 44
End: 2024-12-04
Payer: MEDICAID

## 2024-12-04 ENCOUNTER — TELEPHONE (OUTPATIENT)
Dept: PALLIATIVE MEDICINE | Facility: HOSPITAL | Age: 44
End: 2024-12-04
Payer: MEDICAID

## 2024-12-04 NOTE — TELEPHONE ENCOUNTER
Patient called after no show this afternoon with Elissa Small with supportive oncology.  Patient rescheduled for 12/18 at 3 pm at  [facility]  Reminder of appointments mailed to patient.

## 2024-12-10 ENCOUNTER — SOCIAL WORK (OUTPATIENT)
Dept: CASE MANAGEMENT | Facility: HOSPITAL | Age: 44
End: 2024-12-10
Payer: MEDICAID

## 2024-12-10 ENCOUNTER — TELEPHONE (OUTPATIENT)
Dept: HEMATOLOGY/ONCOLOGY | Facility: HOSPITAL | Age: 44
End: 2024-12-10
Payer: MEDICAID

## 2024-12-10 NOTE — PROGRESS NOTES
Social Work Note    Meeting Location: Phone  Person(s) Present: Nai Bella LSW  Identified Needs: Unknown  Impression and Plan: JUNI received call from Shayne, left  to return call. JUNI called Shayne back, no answer. Could not leave  due to in box being full. JUNI sent text from work cell phone. Unsure of needs, but will remain available.   Interventions Provided: Care Coordination  Estimated Time Spent: 5 minutes

## 2024-12-10 NOTE — PROGRESS NOTES
SUPPORTIVE AND PALLIATIVE ONCOLOGY OUTPATIENT FOLLOW-UP      SERVICE DATE: 12/18/2024    Subjective   HISTORY OF PRESENT ILLNESS: Shayne Messer is a 43 y.o. male who presents with depression with suicidal attempts, bipolar disorder, tobacco abuse, illicit drug abuse, and diagnosis of Tonsillar Squamous Cell Carcinoma. Patient has been referred to Supportive Oncology/Palliative Care for neoplasm related pain and further symptom management.      Since last visit 9/25/2024, patient has been admitted multiple times to inpatient psych units. During one of his admissions, patient seen by Dr. Coto and he was weaned off of Methadone completely due to consistent substance abuse and no further evidence of cancer. Per his psychiatrist, Dr. Coto, patient no longer tor receive Methadone.     Pain Assessment:  Pain Score:    Location:    Description:      Symptom Assessment:  Pain:{ :26091}  Numbness or Tingling in hands/feet/other: { :70281}  Sore Muscles/Spasms: { :63292}  Headache: {  :77232}  Dizziness:{ :81379}  Constipation: { :76796}  Diarrhea: { :55425}  Nausea: { :06306}  Vomiting: { :77300}  Lack of Appetite: {  :11808}   Weight Loss: { :37928}  Taste changes: { :63933}  Dry Mouth: { :27897}  Pain in Mouth/Swallowing: { :27282}  Lack of Energy: { :77763}  Difficulty Sleeping: { :09992}  Worrying: {  :52486}  Anxiety: { :74806}  Depression: { :43633}  Shortness of breath: { :40212}  Other: { :67932}      Information obtained from: { :82699}  ______________________________________________________________________        Objective       {If you would like to pull in Lab results for the last 24 hours, type .[id number] :99}  {If you would like to pull in Imaging results, type .imgrslt :99}       PHYSICAL EXAMINATION   Vital Signs:   Vital signs reviewed  There were no vitals filed for this visit.  Pain Score:       Physical Exam    ASSESSMENT/PLAN    Pain  Pain is: chronic after cancer treatment, + dental pain  Home regimen:    - Continue Acetaminophen 1000mg t3teucs PRN  - Methadone - completely weaned off due to continued substance abuse and no active cancer. Patient weaned off during recent inpatient psych admission  - EKG: (6/15/23) QTc 455. (12/7/23) Qtc 447. (5/15/2024) Qtc 433. Repeat with Methadone increase, or once yearly  - Referral to Addiction Medicine - 9/25/2024  - Continue BMX 10mg QID PRN  - Start Oralgel 10% 4x daily PRN for tooth pain  - Viscous Lidocaine 2% - declines - caused patient to vomit due to taste  - Morphine - patient went to detox and taken off medication  - 4/20/23: Patient admitted to doing Meth, no further opioids at this time  - Referral to [facility] (9/14/2023) - never scheduled  - [facility] [employer] [facility] dental work - planning for full teeth extraction      Opioid Use  Medication Management:   - OARRS report reviewed with no aberrant behavior; consistent with  prescriptions/records and patient history  - .  Overdose Risk Score 560. This has been discussed with patient.   - We will continue to closely monitor the patient for signs of prescription misuse including UDS, OARRS review and subjective reports at each visit.  - No concurrent benzodiazepine use   - I am a provider who either is or has consulted and collaborated with a provider certified in Hospice and Palliative Medicine and have conducted a face-face visit and examination for this patient.  - Routine Urine Drug Screen: 1/11/23 appropriately negative for illicit substances. 4/20/23, 6/29/23, 11/9/23 appropriately + for prescribed medications and  - for illicits. 5/15/2024 + for cocaine. 6/13/24 + cocaine. 8/22/2024 + for cocaine.  9/25/2024 + for cocaine  - Controlled Substance Agreement: 5/15/2024  - Specifically discussed that controlled substance prescriptions will only be provided by our group as outlined in the completed agreement  - Prescribed naloxone: declined 1/11/23  - Red Flags: hx of cocaine  abuse; hx of suicidal attempts     Constipation  At risk for constipation related to opioids.  Usual bowel pattern: daily  Home regimen:   - Continue Senna 2 tabs 1-2x per day PRN  - Continue Miralax 17grams 1-2x per day PRN     Decreased Appetite  Related to  pain/treatment hx  Nutrition following - Amy Lejeune, RDN  Home regimen:    - Continue to monitor  - Continue protein supplements     Xerostomia  - Continue Biotene Rinses 15mL QID PRN  - Continue Pilocarpine 5mg TID PRN     Interdisciplinary  - 9/25/2024: reached out to  as patient is worried he will be kicked out of his apartment as he cannot keep up with rent each month. If he were to be evicted, he will not have a place to live      Supportive and Palliative Oncology Encounter:  Emotional support provided  Coordination of care  Will continue to follow and address symptoms as needed     Advance Directives  Code Status: Full code    Next Follow-Up Visit:  Return to clinic in ***    Signature and billing  Medical complexity was { :60906} level due to due to complexity of problems, extensive data review, and high risk of management/treatment.  Time was spent on the following: { :52298}. Total time spent: ***      Data  Diagnostic tests and information reviewed for today's visit:  { :95418}     Some elements copied from Palliative Care note on 9/25/2024, the elements have been updated and all reflect current decision making from today, 12/18/2024.      Plan of Care discussed with: { :33041}    SIGNATURE: SINDY Yanes-CNP    Contact information:  Supportive and Palliative Oncology  Monday-Friday 8 AM-5 PM  Phone:  [phone], press option #5, then option #1.   Or Epic Secure Chat

## 2024-12-10 NOTE — TELEPHONE ENCOUNTER
RN attempted to call Shayne, but voicemail box was full. He had an appointment today with Dr. Gibson at 9:40. RN wanted to see if he was still coming to this appointment or if he would like to reschedule.

## 2024-12-16 ENCOUNTER — CLINICAL SUPPORT (OUTPATIENT)
Dept: EMERGENCY MEDICINE | Facility: HOSPITAL | Age: 44
End: 2024-12-16
Payer: MEDICAID

## 2024-12-16 ENCOUNTER — HOSPITAL ENCOUNTER (OUTPATIENT)
Facility: HOSPITAL | Age: 44
Setting detail: OBSERVATION
Discharge: OTHER INSTITUTION | End: 2024-12-16
Attending: EMERGENCY MEDICINE | Admitting: EMERGENCY MEDICINE
Payer: MEDICAID

## 2024-12-16 VITALS
SYSTOLIC BLOOD PRESSURE: 130 MMHG | HEIGHT: 69 IN | RESPIRATION RATE: 16 BRPM | TEMPERATURE: 97.5 F | HEART RATE: 78 BPM | BODY MASS INDEX: 25.18 KG/M2 | OXYGEN SATURATION: 100 % | DIASTOLIC BLOOD PRESSURE: 88 MMHG | WEIGHT: 170 LBS

## 2024-12-16 DIAGNOSIS — R45.851 SUICIDAL IDEATION: Primary | ICD-10-CM

## 2024-12-16 LAB
ABO GROUP (TYPE) IN BLOOD: NORMAL
ALBUMIN SERPL BCP-MCNC: 4.5 G/DL (ref 3.4–5)
ALP SERPL-CCNC: 52 U/L (ref 33–120)
ALT SERPL W P-5'-P-CCNC: 8 U/L (ref 10–52)
AMPHETAMINES UR QL SCN: ABNORMAL
ANION GAP SERPL CALC-SCNC: 18 MMOL/L (ref 10–20)
ANTIBODY SCREEN: NORMAL
APAP SERPL-MCNC: <10 UG/ML
APPEARANCE UR: CLEAR
AST SERPL W P-5'-P-CCNC: 14 U/L (ref 9–39)
ATRIAL RATE: 97 BPM
BARBITURATES UR QL SCN: ABNORMAL
BASOPHILS # BLD AUTO: 0.03 X10*3/UL (ref 0–0.1)
BASOPHILS NFR BLD AUTO: 0.4 %
BENZODIAZ UR QL SCN: ABNORMAL
BILIRUB SERPL-MCNC: 0.3 MG/DL (ref 0–1.2)
BILIRUB UR STRIP.AUTO-MCNC: NEGATIVE MG/DL
BUN SERPL-MCNC: 9 MG/DL (ref 6–23)
BZE UR QL SCN: ABNORMAL
CALCIUM SERPL-MCNC: 9.3 MG/DL (ref 8.6–10.6)
CANNABINOIDS UR QL SCN: ABNORMAL
CHLORIDE SERPL-SCNC: 100 MMOL/L (ref 98–107)
CO2 SERPL-SCNC: 21 MMOL/L (ref 21–32)
COLOR UR: NORMAL
CREAT SERPL-MCNC: 1 MG/DL (ref 0.5–1.3)
EGFRCR SERPLBLD CKD-EPI 2021: >90 ML/MIN/1.73M*2
EOSINOPHIL # BLD AUTO: 0.06 X10*3/UL (ref 0–0.7)
EOSINOPHIL NFR BLD AUTO: 0.7 %
ERYTHROCYTE [DISTWIDTH] IN BLOOD BY AUTOMATED COUNT: 12.7 % (ref 11.5–14.5)
ETHANOL SERPL-MCNC: 45 MG/DL
FENTANYL+NORFENTANYL UR QL SCN: ABNORMAL
GLUCOSE SERPL-MCNC: 100 MG/DL (ref 74–99)
GLUCOSE UR STRIP.AUTO-MCNC: NORMAL MG/DL
HCT VFR BLD AUTO: 37.3 % (ref 41–52)
HGB BLD-MCNC: 13.4 G/DL (ref 13.5–17.5)
IMM GRANULOCYTES # BLD AUTO: 0.02 X10*3/UL (ref 0–0.7)
IMM GRANULOCYTES NFR BLD AUTO: 0.2 % (ref 0–0.9)
KETONES UR STRIP.AUTO-MCNC: NEGATIVE MG/DL
LEUKOCYTE ESTERASE UR QL STRIP.AUTO: NEGATIVE
LYMPHOCYTES # BLD AUTO: 0.87 X10*3/UL (ref 1.2–4.8)
LYMPHOCYTES NFR BLD AUTO: 10.6 %
MCH RBC QN AUTO: 31.1 PG (ref 26–34)
MCHC RBC AUTO-ENTMCNC: 35.9 G/DL (ref 32–36)
MCV RBC AUTO: 87 FL (ref 80–100)
METHADONE UR QL SCN: ABNORMAL
MONOCYTES # BLD AUTO: 0.52 X10*3/UL (ref 0.1–1)
MONOCYTES NFR BLD AUTO: 6.4 %
NEUTROPHILS # BLD AUTO: 6.67 X10*3/UL (ref 1.2–7.7)
NEUTROPHILS NFR BLD AUTO: 81.7 %
NITRITE UR QL STRIP.AUTO: NEGATIVE
NRBC BLD-RTO: 0 /100 WBCS (ref 0–0)
OPIATES UR QL SCN: ABNORMAL
OXYCODONE+OXYMORPHONE UR QL SCN: ABNORMAL
P AXIS: 78 DEGREES
P OFFSET: 203 MS
P ONSET: 146 MS
PCP UR QL SCN: ABNORMAL
PH UR STRIP.AUTO: 6.5 [PH]
PLATELET # BLD AUTO: 222 X10*3/UL (ref 150–450)
POTASSIUM SERPL-SCNC: 3.9 MMOL/L (ref 3.5–5.3)
PR INTERVAL: 148 MS
PROT SERPL-MCNC: 7.3 G/DL (ref 6.4–8.2)
PROT UR STRIP.AUTO-MCNC: NEGATIVE MG/DL
Q ONSET: 220 MS
QRS COUNT: 16 BEATS
QRS DURATION: 84 MS
QT INTERVAL: 378 MS
QTC CALCULATION(BAZETT): 480 MS
QTC FREDERICIA: 443 MS
R AXIS: 79 DEGREES
RBC # BLD AUTO: 4.31 X10*6/UL (ref 4.5–5.9)
RBC # UR STRIP.AUTO: NEGATIVE /UL
RH FACTOR (ANTIGEN D): NORMAL
SALICYLATES SERPL-MCNC: <3 MG/DL
SODIUM SERPL-SCNC: 135 MMOL/L (ref 136–145)
SP GR UR STRIP.AUTO: 1.01
T AXIS: 80 DEGREES
T OFFSET: 409 MS
UROBILINOGEN UR STRIP.AUTO-MCNC: NORMAL MG/DL
VENTRICULAR RATE: 97 BPM
WBC # BLD AUTO: 8.2 X10*3/UL (ref 4.4–11.3)

## 2024-12-16 PROCEDURE — 86900 BLOOD TYPING SEROLOGIC ABO: CPT | Performed by: EMERGENCY MEDICINE

## 2024-12-16 PROCEDURE — G0378 HOSPITAL OBSERVATION PER HR: HCPCS

## 2024-12-16 PROCEDURE — 85025 COMPLETE CBC W/AUTO DIFF WBC: CPT | Performed by: EMERGENCY MEDICINE

## 2024-12-16 PROCEDURE — 80053 COMPREHEN METABOLIC PANEL: CPT | Performed by: EMERGENCY MEDICINE

## 2024-12-16 PROCEDURE — 2500000001 HC RX 250 WO HCPCS SELF ADMINISTERED DRUGS (ALT 637 FOR MEDICARE OP): Mod: SE

## 2024-12-16 PROCEDURE — 93005 ELECTROCARDIOGRAM TRACING: CPT

## 2024-12-16 PROCEDURE — 93005 ELECTROCARDIOGRAM TRACING: CPT | Mod: 59

## 2024-12-16 PROCEDURE — 36415 COLL VENOUS BLD VENIPUNCTURE: CPT | Performed by: EMERGENCY MEDICINE

## 2024-12-16 PROCEDURE — 80307 DRUG TEST PRSMV CHEM ANLYZR: CPT | Performed by: EMERGENCY MEDICINE

## 2024-12-16 PROCEDURE — 99285 EMERGENCY DEPT VISIT HI MDM: CPT | Performed by: EMERGENCY MEDICINE

## 2024-12-16 PROCEDURE — 81003 URINALYSIS AUTO W/O SCOPE: CPT | Mod: 59 | Performed by: EMERGENCY MEDICINE

## 2024-12-16 PROCEDURE — 2500000001 HC RX 250 WO HCPCS SELF ADMINISTERED DRUGS (ALT 637 FOR MEDICARE OP): Mod: SE | Performed by: EMERGENCY MEDICINE

## 2024-12-16 PROCEDURE — 96374 THER/PROPH/DIAG INJ IV PUSH: CPT

## 2024-12-16 PROCEDURE — 2500000004 HC RX 250 GENERAL PHARMACY W/ HCPCS (ALT 636 FOR OP/ED): Mod: SE

## 2024-12-16 PROCEDURE — 80320 DRUG SCREEN QUANTALCOHOLS: CPT | Performed by: EMERGENCY MEDICINE

## 2024-12-16 PROCEDURE — 99245 OFF/OP CONSLTJ NEW/EST HI 55: CPT | Performed by: PSYCHIATRY & NEUROLOGY

## 2024-12-16 RX ORDER — LORAZEPAM 1 MG/1
2 TABLET ORAL EVERY 6 HOURS PRN
Status: DISCONTINUED | OUTPATIENT
Start: 2024-12-16 | End: 2024-12-16 | Stop reason: HOSPADM

## 2024-12-16 RX ORDER — LORAZEPAM 2 MG/ML
2 INJECTION INTRAMUSCULAR EVERY 6 HOURS PRN
Status: DISCONTINUED | OUTPATIENT
Start: 2024-12-16 | End: 2024-12-16 | Stop reason: HOSPADM

## 2024-12-16 RX ORDER — KETOROLAC TROMETHAMINE 15 MG/ML
15 INJECTION, SOLUTION INTRAMUSCULAR; INTRAVENOUS ONCE
Status: DISCONTINUED | OUTPATIENT
Start: 2024-12-16 | End: 2024-12-16

## 2024-12-16 RX ORDER — HALOPERIDOL 5 MG/1
5 TABLET ORAL ONCE
Status: COMPLETED | OUTPATIENT
Start: 2024-12-16 | End: 2024-12-16

## 2024-12-16 RX ORDER — HALOPERIDOL 5 MG/1
5 TABLET ORAL EVERY 6 HOURS PRN
Status: DISCONTINUED | OUTPATIENT
Start: 2024-12-16 | End: 2024-12-16 | Stop reason: HOSPADM

## 2024-12-16 RX ORDER — HALOPERIDOL 5 MG/ML
5 INJECTION INTRAMUSCULAR EVERY 6 HOURS PRN
Status: DISCONTINUED | OUTPATIENT
Start: 2024-12-16 | End: 2024-12-16 | Stop reason: HOSPADM

## 2024-12-16 RX ORDER — KETOROLAC TROMETHAMINE 15 MG/ML
15 INJECTION, SOLUTION INTRAMUSCULAR; INTRAVENOUS ONCE
Status: COMPLETED | OUTPATIENT
Start: 2024-12-16 | End: 2024-12-16

## 2024-12-16 RX ORDER — ACETAMINOPHEN 325 MG/1
975 TABLET ORAL ONCE
Status: COMPLETED | OUTPATIENT
Start: 2024-12-16 | End: 2024-12-16

## 2024-12-16 SDOH — HEALTH STABILITY: MENTAL HEALTH: BEHAVIORS/MOOD: FLAT AFFECT;SLEEPING;UNCOOPERATIVE;AGITATED;GUARDED;PARANOID

## 2024-12-16 SDOH — HEALTH STABILITY: MENTAL HEALTH: CONTENT: UNREMARKABLE

## 2024-12-16 SDOH — HEALTH STABILITY: MENTAL HEALTH

## 2024-12-16 SDOH — HEALTH STABILITY: MENTAL HEALTH: BEHAVIORAL HEALTH(WDL): EXCEPTIONS TO WDL

## 2024-12-16 SDOH — HEALTH STABILITY: MENTAL HEALTH: FOR HIGH RISK PATIENTS: 1:1 PATIENT OBSERVER AT ALL TIMES

## 2024-12-16 SDOH — HEALTH STABILITY: MENTAL HEALTH: DELUSIONS: OTHER (COMMENT)

## 2024-12-16 SDOH — HEALTH STABILITY: MENTAL HEALTH: HAVE YOU ACTUALLY HAD ANY THOUGHTS OF KILLING YOURSELF?: YES

## 2024-12-16 SDOH — HEALTH STABILITY: MENTAL HEALTH
OTHER SUICIDE PRECAUTIONS INCLUDE: PATIENT PLACED IN GOWN (SNAPS OR PAPER GOWNS PREFERRED) AND WANDED;PATIENT PLACED IN PSYCH SAFE ROOM (IF AVAILABLE);PROVIDER NOTIFIED;ELOPEMENT RISK IDENTIFIED;PERSONAL BELONGINGS SECURED

## 2024-12-16 SDOH — HEALTH STABILITY: MENTAL HEALTH: HAVE YOU EVER DONE ANYTHING, STARTED TO DO ANYTHING, OR PREPARED TO DO ANYTHING TO END YOUR LIFE?: YES

## 2024-12-16 SDOH — HEALTH STABILITY: MENTAL HEALTH: WAS THIS WITHIN THE PAST THREE MONTHS?: YES

## 2024-12-16 SDOH — SOCIAL STABILITY: SOCIAL NETWORK: EMOTIONAL SUPPORT GIVEN: REASSURE

## 2024-12-16 SDOH — HEALTH STABILITY: MENTAL HEALTH: HAVE YOU BEEN THINKING ABOUT HOW YOU MIGHT DO THIS?: YES

## 2024-12-16 SDOH — HEALTH STABILITY: MENTAL HEALTH: HAVE YOU HAD THESE THOUGHTS AND HAD SOME INTENTION OF ACTING ON THEM?: YES

## 2024-12-16 SDOH — HEALTH STABILITY: MENTAL HEALTH: NEEDS EXPRESSED: DENIES

## 2024-12-16 SDOH — HEALTH STABILITY: MENTAL HEALTH: SUICIDE ASSESSMENT: ADULT (C-SSRS)

## 2024-12-16 SDOH — HEALTH STABILITY: MENTAL HEALTH: CONTENT: UNABLE TO ASSESS

## 2024-12-16 SDOH — HEALTH STABILITY: MENTAL HEALTH: BEHAVIORS/MOOD: FLAT AFFECT;CALM;SLEEPING;UNCOOPERATIVE

## 2024-12-16 SDOH — HEALTH STABILITY: MENTAL HEALTH: HAVE YOU WISHED YOU WERE DEAD OR WISHED YOU COULD GO TO SLEEP AND NOT WAKE UP?: YES

## 2024-12-16 ASSESSMENT — LIFESTYLE VARIABLES
TOTAL SCORE: 0
HAVE PEOPLE ANNOYED YOU BY CRITICIZING YOUR DRINKING: NO
HAVE YOU EVER FELT YOU SHOULD CUT DOWN ON YOUR DRINKING: NO
EVER FELT BAD OR GUILTY ABOUT YOUR DRINKING: NO
EVER HAD A DRINK FIRST THING IN THE MORNING TO STEADY YOUR NERVES TO GET RID OF A HANGOVER: NO

## 2024-12-16 ASSESSMENT — PAIN DESCRIPTION - PAIN TYPE: TYPE: CHRONIC PAIN

## 2024-12-16 ASSESSMENT — PAIN DESCRIPTION - LOCATION
LOCATION: MOUTH
LOCATION: MOUTH

## 2024-12-16 ASSESSMENT — COLUMBIA-SUICIDE SEVERITY RATING SCALE - C-SSRS
5. HAVE YOU STARTED TO WORK OUT OR WORKED OUT THE DETAILS OF HOW TO KILL YOURSELF? DO YOU INTEND TO CARRY OUT THIS PLAN?: NO
6. HAVE YOU EVER DONE ANYTHING, STARTED TO DO ANYTHING, OR PREPARED TO DO ANYTHING TO END YOUR LIFE?: YES
1. IN THE PAST MONTH, HAVE YOU WISHED YOU WERE DEAD OR WISHED YOU COULD GO TO SLEEP AND NOT WAKE UP?: YES
4. HAVE YOU HAD THESE THOUGHTS AND HAD SOME INTENTION OF ACTING ON THEM?: YES
2. HAVE YOU ACTUALLY HAD ANY THOUGHTS OF KILLING YOURSELF?: YES
6. HAVE YOU EVER DONE ANYTHING, STARTED TO DO ANYTHING, OR PREPARED TO DO ANYTHING TO END YOUR LIFE?: YES

## 2024-12-16 ASSESSMENT — PAIN SCALES - GENERAL
PAINLEVEL_OUTOF10: 10 - WORST POSSIBLE PAIN
PAINLEVEL_OUTOF10: 10 - WORST POSSIBLE PAIN
PAINLEVEL_OUTOF10: 0 - NO PAIN

## 2024-12-16 ASSESSMENT — PAIN - FUNCTIONAL ASSESSMENT
PAIN_FUNCTIONAL_ASSESSMENT: 0-10

## 2024-12-16 NOTE — CONSULTS
"Reason For Consult  SI without plan    History Of Present Illness  Mr. Shayne Messer is a 44 y.o. male with a past medical hx of mouth cancer s/p chemotherapy and radiation + past psychiatric hx of schizoaffective disorder, polysubstance use, and depression presenting with suicidal ideation in the context of his chronic mouth pain.     Upon interview, patient was laying in bed with eyes closed. He did not acknowledge our presence in the room and did not reply when asked questions. Eventually, patient reported \"I'm fine.\" He stated \"no,\" when asked about a suicide plan. Denies AH. Replied \"I don't know\" when asked if would harm himself if he were discharged home. Patient said \"Im not talking about it,\" when questioned about substance use.   I called Ms Chitra Myers (Mother, [phone]) who reported she has not been in contact with her son for a few months after a verbal altercation with him over text. She says that overall he has been \"not well;\" however, she does not know much about his current situation, reporting \"its been hard.\"         MSE  Appearance:  male, appears stated age, shirtless   Behavior: Not engaged in interview, eyes closed  Attitude: calm, uncooperative   Motor: No PMA or PMR appreciated. No EPS appreciated.  Speech:  Normal rate, tone and volume. Spontaneous. Poverty of speech.   Mood: \"I'm fine\"  Affect: dysthymic, constricted   Thought Process: Linear, logical  Thought Content: Denies SI.  no apparent delusions or paranoia  Perception:  Denies AH. Not responsive to internal stimuli.  Cognition: Alert and grossly oriented. Fair attention, memory, comprehension.  Insight: Fair   Judgment: Fair     Social History  He reports that he has been smoking. He uses smokeless tobacco. He reports current alcohol use. He reports current drug use. Frequency: 3.00 times per week. Drug: Cocaine.       Allergies  Quetiapine: swelling of the neck     Last Recorded Vitals  Blood pressure 123/78, pulse " "80, temperature 37.1 °C (98.8 °F), temperature source Temporal, resp. rate 19, height 1.753 m (5' 9\"), weight 77.1 kg (170 lb), SpO2 99%.      Assessment/Plan   Mr. Shayne Messer is a 44 y.o. male with a past medical hx of mouth cancer s/p chemotherapy and radiation + past psychiatric hx of schizoaffective disorder, polysubstance use, and depression presenting with suicidal ideation in the context of his chronic mouth pain. Patient appears dysthymic with poverty of speech and reports \"I don't know\" about whether he would harm himself if discharged. Given past hx of multiple suicide attempts and patient not being able to tell us if he would be safe if discharged, patient does meet criteria for psychiatric admission due to acute risk of safety to self.     Plan  -Psychiatric admission   -Please order the following agitation PRNs: First line: Haldol 5mg, Second line: Ativan 2mg, please attempt PO administration before IM route.         Radha Puga MD  Psychiatry, PGY-1  [facility]   "

## 2024-12-16 NOTE — PROGRESS NOTES
"Observation History and Physical  [facility] EMERGENCY MEDICINE           History of Present Illness     History provided by: Patient and EMS  Limitations to History: None  External Records Reviewed: previous provider's note, psychiatry consult note      Patient History:  Shayne Messer is a 44 y.o. male who presented to the emergency department initially for oral bleeding and pain in the setting of known squamous cell carcinoma of the head and neck.  Also endorse significant suicidal ideation with recent admission for medication administration and adjustment.  Still symptomatic with plan.    Shayne Messer was escalated to observation status. Observation was necessary as they continue to require treatment and monitoring of their psychiatric illness while awaiting inpatient behavioral health bed availability.    Physical Exam     Visit Vitals  /78 (BP Location: Left arm, Patient Position: Lying)   Pulse 80   Temp 37.1 °C (98.8 °F) (Temporal)   Resp 19   Ht 1.753 m (5' 9\")   Wt 77.1 kg (170 lb)   SpO2 99%   BMI 25.10 kg/m²   Smoking Status Every Day   BSA 1.94 m²       GENERAL:  The patient appears nourished and normally developed. Vital signs as documented.     PULMONARY:  Without any respiratory distress. Able to speak full sentences, no accessory muscle use    CARDIAC: Warm and well perfused. No cyanosis.    MUSCULOSKELETAL:   Able to ambulate, Non edematous, with no obvious deformities.     SKIN: No pallor. Intact.    NEURO:  No obvious neurological deficits.  Able to follow commands.    Psych: Depressed mood, flat affect, endorses SI, vague plan.  Denies HI, denies AVH.      Impression and Plan     ED Course as of 12/16/24 1544   Mon Dec 16, 2024   0732 ECG 12 lead  EKG interpreted by me (ED resident): 97 bpm, normal sinus rhythm with a normal axis and prolonged QT.  , QRS 84, QTc 480.  No ST segment changes concerning for ischemia.  Compared with prior EKG on 11/1/2024, there is no " significant change. [HH]      ED Course User Index  [HH] Bela James MD         Diagnoses as of 12/16/24 1544   Suicidal ideation        Shayne Messer under observation status in [facility] EMERGENCY MEDICINE for psychiatric illness monitoring and treatment while awaiting inpatient behavioral health bed availability.   Emergency Psychiatric Assessment Team has been consulted. Case discussed with them and decision for inpatient hospitalization deemed necessary. Patient is medically clear at this time.     Home medication reconciliation was reviewed and restarted where clinically indicated.     Patient and Family updated on plan of care.     Justo Hoffmann MD

## 2024-12-16 NOTE — ED TRIAGE NOTES
Pt states that his mouth won't quit bleeding and hurting. Also states that his heart doesn't feel right. Pt has pmh of tonsillar CA and multiple overdose attempts.

## 2024-12-16 NOTE — SIGNIFICANT EVENT
Application for Emergency Admission      Ready for Transfer?  Is the patient medically cleared for transfer to inpatient psychiatry: Yes  Has the patient been accepted to an inpatient psychiatric hospital: Yes    Application for Emergency Admission  IN ACCORDANCE WITH [place] 5122[place]10 [place]  The Chief Clinical Officer of: Radha Wiley 12/16/2024 .6:15 PM    Reason for Hospitalization  The undersigned has reason to believe that: Shayne Messer Is a mentally ill person subject to hospitalization by court order under division B Section 5122.01 of the Revised Code, i.e., this person:    1.Yes  Represents a substantial risk of physical harm to self as manifested by evidence of threats of, or attempts at, suicide or serious self-inflicted bodily harm    2.No Represents a substantial risk of physical harm to others as manifested by evidence of recent homicidal or other violent behavior, evidence of recent threats that place another in reasonable fear of violent behavior and serious physical harm, or other evidence of present dangerousness    3.Yes Represents a substantial and immediate risk of serious physical impairment or injury to self as manifested by  evidence that the person is unable to provide for and is not providing for the person's basic physical needs because of the person's mental illness and that appropriate provision for those needs cannot be made  immediately available in the community    4.Yes Would benefit from treatment in a hospital for his mental illness and is in need of such treatment as manifested by evidence of behavior that creates a grave and imminent risk to substantial rights of others or  himself.    5.No Would benefit from treatment as manifested by evidence of behavior that indicates all of the following:       (a) The person is unlikely to survive safely in the community without supervision, based on a clinical determination.       (b) The person has a history of lack of compliance with  treatment for mental illness and one of the following applies:      (i) At least twice within the thirty-six months prior to the filing of an affidavit seeking court-ordered treatment of the person under section 5122.111 of the Revised Code, the lack of compliance has been a significant factor in necessitating hospitalization in a hospital or receipt of services in a forensic or other mental health unit of a correctional facility, provided that the thirty-six-month period shall be extended by the length of any hospitalization or incarceration of the person that occurred within the thirty-six-month period.      (ii) Within the forty-eight months prior to the filing of an affidavit seeking court-ordered treatment of the person under section 5122.111 of the Revised Code, the lack of compliance resulted in one or more acts of serious violent behavior toward self or others or threats of, or attempts at, serious physical harm to self or others, provided that the forty-eight-month period shall be extended by the length of any hospitalization or incarceration of the person that occurred within the forty-eight-month period.      (c) The person, as a result of mental illness, is unlikely to voluntarily participate in necessary treatment.       (d) In view of the person's treatment history and current behavior, the person is in need of treatment in order to prevent a relapse or deterioration that would be likely to result in substantial risk of serious harm to the person or others.    (e) Represents a substantial risk of physical harm to self or others if allowed to remain at liberty pending examination.    Therefore, it is requested that said person be admitted to the above named facility.    STATEMENT OF BELIEF    Must be filled out by one of the following: a psychiatrist, licensed physician, licensed clinical psychologist, health or ,  or .  (Statement shall include the circumstances under  which the individual was taken into custody and the reason for the person's belief that hospitalization is necessary. The statement shall also include a reference to efforts made to secure the individual's property at his residence if he was taken into custody there. Every reasonable and appropriate effort should be made to take this person into custody in the least conspicuous manner possible.)    Patient endorsing current suicidal ideations with endorsement that he will kill himself if he leaves.    Shayne Allison MD 12/16/2024     _____________________________________________________________   Place of Employment: [employer]    STATEMENT OF OBSERVATION BY PSYCHIATRIST, LICENSED PHYSICIAN, OR LICENSED CLINICAL PSYCHOLOGIST, IF APPLICABLE    Place of Observation (e.g., [facility] mental health center, general hospital, office, emergency facility)  (If applicable, please complete)    Shayne Allison MD 12/16/2024    _____________________________________________________________

## 2024-12-17 LAB — HOLD SPECIMEN: NORMAL

## 2024-12-18 ENCOUNTER — APPOINTMENT (OUTPATIENT)
Dept: PALLIATIVE MEDICINE | Facility: HOSPITAL | Age: 44
End: 2024-12-18
Payer: MEDICAID

## 2024-12-23 ENCOUNTER — NUTRITION (OUTPATIENT)
Dept: HEMATOLOGY/ONCOLOGY | Facility: HOSPITAL | Age: 44
End: 2024-12-23
Payer: MEDICAID

## 2024-12-23 NOTE — PROGRESS NOTES
NUTRITION COMMUNICATION NOTE    Shayne Messer     REASON FOR COMMUNICATION: Nutrition Supplements    Pt left message requesting re-order of Ensure Plus Vanilla  This RDN called [facility]- [phone]  Was ordered in ensure plus vanilla per message    CMN  Ensure Plus   5 per day    They were able to place and ship order  Called patient to make him aware, no answer, mail box full and unable to leave a message.     Time Spent  Prep time on day of patient encounter: 5 minutes  Time spent directly with patient, family or caregiver: 0 minutes  Additional Time Spent on Patient Care Activities: 5 minutes  Documentation Time: 0 minutes  Other Time Spent: 5 minutes  Total: 15 minutes

## 2024-12-31 ENCOUNTER — TELEPHONE (OUTPATIENT)
Dept: RADIATION ONCOLOGY | Facility: HOSPITAL | Age: 44
End: 2024-12-31
Payer: MEDICAID

## 2024-12-31 ENCOUNTER — TELEPHONE (OUTPATIENT)
Dept: RADIATION ONCOLOGY | Facility: HOSPITAL | Age: 44
End: 2024-12-31

## 2024-12-31 NOTE — TELEPHONE ENCOUNTER
Called pt to remind of appointment on 01/02/25 at 3:30. Pt's phone went to voicemail left number if needs to reschedule.

## 2025-01-02 ENCOUNTER — APPOINTMENT (OUTPATIENT)
Dept: RADIATION ONCOLOGY | Facility: HOSPITAL | Age: 45
End: 2025-01-02
Payer: MEDICAID

## 2025-01-14 ENCOUNTER — NUTRITION (OUTPATIENT)
Dept: HEMATOLOGY/ONCOLOGY | Facility: HOSPITAL | Age: 45
End: 2025-01-14
Payer: MEDICAID

## 2025-01-14 NOTE — PROGRESS NOTES
Pt called in re his Ensure order  Before RDN looked at chart, pt was requesting a renewal of his Ensure, vanilla  Upon looking at chart April Penny made a call to [facility] 12/23/24 re Ensure.  This RDN called [facility]. Order was shipped, it was delivered on 12/27 @ 12:38 pm to front [facility], there are pictures per [facility] representative.  This RDN called Shayne back, he did not , LVM indicating above and to call with any further questions. Explained he would be due to place a reorder about 6-7 days prior to 1/27.

## 2025-01-20 ENCOUNTER — NUTRITION (OUTPATIENT)
Dept: HEMATOLOGY/ONCOLOGY | Facility: HOSPITAL | Age: 45
End: 2025-01-20
Payer: MEDICAID

## 2025-01-20 NOTE — PROGRESS NOTES
NUTRITION COMMUNICATION NOTE    Shayne Messer     REASON FOR COMMUNICATION: Nutrition Supplements    Pt left message requesting re-order of Ensure   Marla RDN phoned pt last week updating him too soon to reorder.  Phoned pt again today; no answer. Left message with number encouraging call back and notifying pt that this RDN will call [facility] to reorder Ensure.   This RDN called [facility]- [phone]  Ordered ensure plus vanilla; per [facility] won't ship for a few more days as he is not due until 12/26 but they were able to accept the order and it will ship a few days before then.      CMN  Ensure Plus   5 per day    Phoned pt again. Message left regarding update on order and shipping and encouraged call back if has questions.     Time Spent  Prep time on day of patient encounter: 5 minutes  Time spent directly with patient, family or caregiver: 0 minutes  Additional Time Spent on Patient Care Activities: 5 minutes  Documentation Time: 5 minutes  Other Time Spent: 0 minutes  Total: 15 minutes

## 2025-01-22 ENCOUNTER — APPOINTMENT (OUTPATIENT)
Dept: PALLIATIVE MEDICINE | Facility: HOSPITAL | Age: 45
End: 2025-01-22
Payer: MEDICAID

## 2025-01-23 ENCOUNTER — NUTRITION (OUTPATIENT)
Dept: HEMATOLOGY/ONCOLOGY | Facility: HOSPITAL | Age: 45
End: 2025-01-23
Payer: MEDICAID

## 2025-01-23 ENCOUNTER — APPOINTMENT (OUTPATIENT)
Dept: PALLIATIVE MEDICINE | Facility: HOSPITAL | Age: 45
End: 2025-01-23
Payer: MEDICAID

## 2025-01-23 NOTE — PROGRESS NOTES
NUTRITION COMMUNICATION NOTE    Shayne Messer     REASON FOR COMMUNICATION:     Pt called yestrday and left message- tried to call him back but  had to leave a message  He called  me back today and left message  I was able to speak with him today    He tells me he received an order last week   Iw he was shorted 2 cartons- (1 box) 48 containers of his eensure plus  [facility] was called  I am told hsi last order was delivered to him on 12/27/2024   1most recently an order was placed for him on 1/20/2025   Should get Saturday 1/25/2025 by 1pm     Pt was called and had to leave a message regarding the impending shipment

## 2025-01-24 NOTE — PROGRESS NOTES
NUTRITION COMMUNICATION NOTE    Shayne Messer     REASON FOR COMMUNICATION:     Received a message to see patint in clinic today  He is meeting with Dr. Jerome    Patient reports to me he is taking Boost VHC- 4 per day  He is currently out as some days he drinks 5  His weight is up  4 per day provides- 2120 calories and 88 g protein    Beyond above supplements diet is limited to liquids for the most part- due to his  symptoms of :  Dry mouth  Poor dentition  Mouth pain   Which continue and hinder po intake     He is also consuming po:    Fruity Pebble protein- powder (1 scoop is 120 calories and 25 g protein powder)   [place] (2 TBSP 50 calories and 0g  protein)   And blends strawberries bluberries and banana some times to some of his drinks / supplements   Has tried some soft foods like rice takes a while to eat - hard chew to swalllow     Today we called [facility] together for the re-order- they still onluy have Boost [facility] to send him.  He is fine with this for now.    He has number for [facility] so he can contact  He was given my business card again     HT: 5'9  IBW: 72.7 kg  102.7 % IBW  BMI: 24.3    Wt Readings from Last 10 Encounters:   01/10/24 74.7 kg (164 lb 9.6 oz)   12/07/23 72.6 kg (160 lb 0.9 oz)   11/15/23 69.9 kg (154 lb 3.2 oz)   11/09/23 68.3 kg (150 lb 9.2 oz)   10/12/23 70.8 kg (156 lb)   10/04/23 71 kg (156 lb 8.4 oz)   10/01/23 68 kg (150 lb)   09/30/23 70.3 kg (155 lb)   07/19/23 72.4 kg (159 lb 11.2 oz)   06/29/23 73.3 kg (161 lb 9.6 oz)              Time Spent  Prep time on day of patient encounter: 5 minutes  Time spent directly with patient, family or caregiver: 20 minutes  Additional Time Spent on Patient Care Activities: 5 minutes  Documentation Time: 15 minutes  Other Time Spent: 5 minutes  Total: 50 minutes        
room air

## 2025-01-28 ENCOUNTER — CLINICAL SUPPORT (OUTPATIENT)
Dept: EMERGENCY MEDICINE | Facility: HOSPITAL | Age: 45
End: 2025-01-28
Payer: MEDICAID

## 2025-01-28 ENCOUNTER — HOSPITAL ENCOUNTER (EMERGENCY)
Facility: HOSPITAL | Age: 45
Discharge: ANOTHER HOSPITAL | End: 2025-01-29
Attending: EMERGENCY MEDICINE
Payer: MEDICAID

## 2025-01-28 ENCOUNTER — NURSE TRIAGE (OUTPATIENT)
Dept: ADMISSION | Facility: HOSPITAL | Age: 45
End: 2025-01-28
Payer: MEDICAID

## 2025-01-28 DIAGNOSIS — R45.851 SUICIDAL IDEATION: Primary | ICD-10-CM

## 2025-01-28 LAB
ALBUMIN SERPL BCP-MCNC: 4.2 G/DL (ref 3.4–5)
ALP SERPL-CCNC: 56 U/L (ref 33–120)
ALT SERPL W P-5'-P-CCNC: 8 U/L (ref 10–52)
AMPHETAMINES UR QL SCN: ABNORMAL
ANION GAP SERPL CALC-SCNC: 14 MMOL/L (ref 10–20)
APAP SERPL-MCNC: <10 UG/ML
AST SERPL W P-5'-P-CCNC: 25 U/L (ref 9–39)
BARBITURATES UR QL SCN: ABNORMAL
BASOPHILS # BLD AUTO: 0.03 X10*3/UL (ref 0–0.1)
BASOPHILS NFR BLD AUTO: 0.5 %
BENZODIAZ UR QL SCN: ABNORMAL
BILIRUB SERPL-MCNC: 0.3 MG/DL (ref 0–1.2)
BUN SERPL-MCNC: 16 MG/DL (ref 6–23)
BZE UR QL SCN: ABNORMAL
CALCIUM SERPL-MCNC: 9.7 MG/DL (ref 8.6–10.6)
CANNABINOIDS UR QL SCN: ABNORMAL
CHLORIDE SERPL-SCNC: 102 MMOL/L (ref 98–107)
CK SERPL-CCNC: 300 U/L (ref 0–325)
CO2 SERPL-SCNC: 26 MMOL/L (ref 21–32)
CREAT SERPL-MCNC: 0.96 MG/DL (ref 0.5–1.3)
EGFRCR SERPLBLD CKD-EPI 2021: >90 ML/MIN/1.73M*2
EOSINOPHIL # BLD AUTO: 0.13 X10*3/UL (ref 0–0.7)
EOSINOPHIL NFR BLD AUTO: 2.1 %
ERYTHROCYTE [DISTWIDTH] IN BLOOD BY AUTOMATED COUNT: 11.4 % (ref 11.5–14.5)
ETHANOL SERPL-MCNC: <10 MG/DL
FENTANYL+NORFENTANYL UR QL SCN: ABNORMAL
GLUCOSE SERPL-MCNC: 101 MG/DL (ref 74–99)
HCT VFR BLD AUTO: 35 % (ref 41–52)
HGB BLD-MCNC: 12.3 G/DL (ref 13.5–17.5)
IMM GRANULOCYTES # BLD AUTO: 0.02 X10*3/UL (ref 0–0.7)
IMM GRANULOCYTES NFR BLD AUTO: 0.3 % (ref 0–0.9)
LYMPHOCYTES # BLD AUTO: 0.64 X10*3/UL (ref 1.2–4.8)
LYMPHOCYTES NFR BLD AUTO: 10.2 %
MCH RBC QN AUTO: 30.1 PG (ref 26–34)
MCHC RBC AUTO-ENTMCNC: 35.1 G/DL (ref 32–36)
MCV RBC AUTO: 86 FL (ref 80–100)
METHADONE UR QL SCN: ABNORMAL
MONOCYTES # BLD AUTO: 0.55 X10*3/UL (ref 0.1–1)
MONOCYTES NFR BLD AUTO: 8.7 %
NEUTROPHILS # BLD AUTO: 4.93 X10*3/UL (ref 1.2–7.7)
NEUTROPHILS NFR BLD AUTO: 78.2 %
NRBC BLD-RTO: 0 /100 WBCS (ref 0–0)
OPIATES UR QL SCN: ABNORMAL
OXYCODONE+OXYMORPHONE UR QL SCN: ABNORMAL
PCP UR QL SCN: ABNORMAL
PLATELET # BLD AUTO: 228 X10*3/UL (ref 150–450)
POTASSIUM SERPL-SCNC: 4.7 MMOL/L (ref 3.5–5.3)
PROT SERPL-MCNC: 7.8 G/DL (ref 6.4–8.2)
RBC # BLD AUTO: 4.08 X10*6/UL (ref 4.5–5.9)
SALICYLATES SERPL-MCNC: <3 MG/DL
SODIUM SERPL-SCNC: 137 MMOL/L (ref 136–145)
WBC # BLD AUTO: 6.3 X10*3/UL (ref 4.4–11.3)

## 2025-01-28 PROCEDURE — 82550 ASSAY OF CK (CPK): CPT | Performed by: EMERGENCY MEDICINE

## 2025-01-28 PROCEDURE — 93005 ELECTROCARDIOGRAM TRACING: CPT

## 2025-01-28 PROCEDURE — 80307 DRUG TEST PRSMV CHEM ANLYZR: CPT

## 2025-01-28 PROCEDURE — 93010 ELECTROCARDIOGRAM REPORT: CPT | Performed by: EMERGENCY MEDICINE

## 2025-01-28 PROCEDURE — 99285 EMERGENCY DEPT VISIT HI MDM: CPT | Performed by: EMERGENCY MEDICINE

## 2025-01-28 PROCEDURE — 36415 COLL VENOUS BLD VENIPUNCTURE: CPT

## 2025-01-28 PROCEDURE — 80053 COMPREHEN METABOLIC PANEL: CPT

## 2025-01-28 PROCEDURE — 80320 DRUG SCREEN QUANTALCOHOLS: CPT

## 2025-01-28 PROCEDURE — 85025 COMPLETE CBC W/AUTO DIFF WBC: CPT

## 2025-01-28 RX ORDER — LAMOTRIGINE 25 MG/1
TABLET ORAL
COMMUNITY
Start: 2025-01-07

## 2025-01-28 RX ORDER — RISPERIDONE 3 MG/1
TABLET ORAL
COMMUNITY
Start: 2025-01-07

## 2025-01-28 RX ORDER — MIRTAZAPINE 30 MG/1
TABLET, FILM COATED ORAL
COMMUNITY
Start: 2025-01-07

## 2025-01-28 RX ORDER — BUSPIRONE HYDROCHLORIDE 10 MG/1
TABLET ORAL
COMMUNITY
Start: 2025-01-07

## 2025-01-28 RX ORDER — METHADONE HYDROCHLORIDE 10 MG/1
TABLET ORAL
COMMUNITY
Start: 2025-01-07

## 2025-01-28 SDOH — HEALTH STABILITY: MENTAL HEALTH: ACTIVE SUICIDAL IDEATION WITH SPECIFIC PLAN AND INTENT (PAST 1 MONTH): YES

## 2025-01-28 SDOH — HEALTH STABILITY: MENTAL HEALTH

## 2025-01-28 SDOH — ECONOMIC STABILITY: HOUSING INSECURITY: FEELS SAFE LIVING IN HOME: YES

## 2025-01-28 SDOH — HEALTH STABILITY: MENTAL HEALTH: SUICIDAL BEHAVIOR (LIFETIME): YES

## 2025-01-28 SDOH — HEALTH STABILITY: MENTAL HEALTH: WAS THIS WITHIN THE PAST THREE MONTHS?: YES

## 2025-01-28 SDOH — HEALTH STABILITY: MENTAL HEALTH
DEPRESSION SYMPTOMS: FEELINGS OF HELPLESSNESS;FEELINGS OF HOPELESSESS;FEELINGS OF WORTHLESSNESS;APPETITE CHANGE;CHANGE IN ENERGY LEVEL;LOSS OF INTEREST;SLEEP DISTURBANCE

## 2025-01-28 SDOH — HEALTH STABILITY: MENTAL HEALTH: ANXIETY SYMPTOMS: GENERALIZED

## 2025-01-28 SDOH — HEALTH STABILITY: MENTAL HEALTH: SUICIDE ASSESSMENT: ADULT (C-SSRS)

## 2025-01-28 SDOH — HEALTH STABILITY: MENTAL HEALTH: HAVE YOU ACTUALLY HAD ANY THOUGHTS OF KILLING YOURSELF?: YES

## 2025-01-28 SDOH — HEALTH STABILITY: MENTAL HEALTH: HAVE YOU BEEN THINKING ABOUT HOW YOU MIGHT DO THIS?: YES

## 2025-01-28 SDOH — HEALTH STABILITY: MENTAL HEALTH: WISH TO BE DEAD (PAST 1 MONTH): YES

## 2025-01-28 SDOH — HEALTH STABILITY: MENTAL HEALTH: HAVE YOU HAD THESE THOUGHTS AND HAD SOME INTENTION OF ACTING ON THEM?: YES

## 2025-01-28 SDOH — HEALTH STABILITY: MENTAL HEALTH: NON-SPECIFIC ACTIVE SUICIDAL THOUGHTS (PAST 1 MONTH): YES

## 2025-01-28 SDOH — HEALTH STABILITY: MENTAL HEALTH: HAVE YOU EVER DONE ANYTHING, STARTED TO DO ANYTHING, OR PREPARED TO DO ANYTHING TO END YOUR LIFE?: YES

## 2025-01-28 SDOH — HEALTH STABILITY: MENTAL HEALTH: ACTIVE SUICIDAL IDEATION WITH SOME INTENT TO ACT, WITHOUT SPECIFIC PLAN (PAST 1 MONTH): YES

## 2025-01-28 SDOH — HEALTH STABILITY: MENTAL HEALTH: SUICIDAL BEHAVIOR (3 MONTHS): YES

## 2025-01-28 SDOH — HEALTH STABILITY: MENTAL HEALTH: HAVE YOU WISHED YOU WERE DEAD OR WISHED YOU COULD GO TO SLEEP AND NOT WAKE UP?: YES

## 2025-01-28 SDOH — HEALTH STABILITY: MENTAL HEALTH: BEHAVIORAL HEALTH(WDL): EXCEPTIONS TO WDL

## 2025-01-28 ASSESSMENT — LIFESTYLE VARIABLES
EVER FELT BAD OR GUILTY ABOUT YOUR DRINKING: NO
PRESCIPTION_ABUSE_PAST_12_MONTHS: NO
HAVE PEOPLE ANNOYED YOU BY CRITICIZING YOUR DRINKING: NO
SUBSTANCE_ABUSE_PAST_12_MONTHS: YES
HAVE YOU EVER FELT YOU SHOULD CUT DOWN ON YOUR DRINKING: NO
EVER HAD A DRINK FIRST THING IN THE MORNING TO STEADY YOUR NERVES TO GET RID OF A HANGOVER: NO
TOTAL SCORE: 0

## 2025-01-28 ASSESSMENT — COLUMBIA-SUICIDE SEVERITY RATING SCALE - C-SSRS
1. IN THE PAST MONTH, HAVE YOU WISHED YOU WERE DEAD OR WISHED YOU COULD GO TO SLEEP AND NOT WAKE UP?: YES
6. HAVE YOU EVER DONE ANYTHING, STARTED TO DO ANYTHING, OR PREPARED TO DO ANYTHING TO END YOUR LIFE?: YES
4. HAVE YOU HAD THESE THOUGHTS AND HAD SOME INTENTION OF ACTING ON THEM?: YES
6. HAVE YOU EVER DONE ANYTHING, STARTED TO DO ANYTHING, OR PREPARED TO DO ANYTHING TO END YOUR LIFE?: OD ON MEDICATION
2. HAVE YOU ACTUALLY HAD ANY THOUGHTS OF KILLING YOURSELF?: YES
6. HAVE YOU EVER DONE ANYTHING, STARTED TO DO ANYTHING, OR PREPARED TO DO ANYTHING TO END YOUR LIFE?: YES
5. HAVE YOU STARTED TO WORK OUT OR WORKED OUT THE DETAILS OF HOW TO KILL YOURSELF? DO YOU INTEND TO CARRY OUT THIS PLAN?: YES

## 2025-01-28 ASSESSMENT — PAIN - FUNCTIONAL ASSESSMENT: PAIN_FUNCTIONAL_ASSESSMENT: 0-10

## 2025-01-28 NOTE — PROGRESS NOTES
EPAT - Social Work Psychiatric Assessment    Arrival Details  Mode of Arrival: Ambulatory  Admission Source: Home  Admission Type: Involuntary  EPAT Assessment Start Date: 01/28/25  EPAT Assessment Start Time: 1700  Name of : Ruby Pineda MSW, LSW    History of Present Illness  Admission Reason: Psychiatric Evaluation  HPI: Shayne Messer is a 44-year-old male presenting to the ED for a psychiatric evaluation. Reportedly, “On the way to ED pt reported taking “a bunch of my Buspar,” states he doesn’t feel well.” Pt is noted to be ‘high risk’ on the [place] [facility]icide Screening at triage. Pt’s chart, triage and provider note all reviewed prior to assessment. On arrival pt BAL is negative and UDS is positive for cocaine and methadone.     Pt has a psychiatric history of schizoaffective disorder, depression and polysubstance abuse. Pt just recently was hospitalized at [facility]. States he was discharged sometime the week of 1/19/25. Pt states his current medications are Risperidone and Buspar. Pt denies any current outpatient providers, stating that he does not believe he was connected with anyone after his recent discharge.     Readmission Information   Readmission within 30 Days: No    Psychiatric Symptoms  Anxiety Symptoms: Generalized  Depression Symptoms: Feelings of helplessness, Feelings of hopelessess, Feelings of worthlessness, Appetite change, Change in energy level, Loss of interest, Sleep disturbance  Elizabeth Symptoms: No problems reported or observed.    Psychosis Symptoms  Hallucination Type: No problems reported or observed.  Delusion Type: No problems reported or observed.    Additional Symptoms - Adult  Generalized Anxiety Disorder: Excessive anxiety/worry  Obsessive Compulsive Disorder: No problems reported or observed.  Panic Attack: No problems reported or observed.  Post Traumatic Stress Disorder: No problems reported or observed.  Delirium: No problems reported or observed.    Past  Psychiatric History/Meds/Treatments  Past Psychiatric History: hx of schizoaffective disorder, depression and polysubstance abuse  Past Psychiatric Meds/Treatments: last known meds: risperidone, buspar // discharged from  sometime week of 1/19  Past Violence/Victimization History: denies    Current Mental Health Contacts   Name/Phone Number: Denies  Provider Name/Phone Number: Denies    Support System: Immediate family    Living Arrangement: Lives alone    Home Safety  Feels Safe Living in Home: Yes    Income Information  Employment Status for: Patient  Employment Status: Unemployed    [facility] Service/Education History  Current or Previous  Service: None    Social/Cultural History  Social History: Pt is a [place] citizen // no guardian // no payee  Cultural Requests During Hospitalization: Denies  Spiritual Requests During Hospitalization: Denies    Legal  Legal Considerations: Patient/ Family Capacity to Make Sound Judgments  Criminal Activity/ Legal Involvement Pertinent to Current Situation/ Hospitalization: None  Legal Concerns: None    Drug Screening  Have you used any substances (canabis, cocaine, heroin, hallucinogens, inhalants, etc.) in the past 12 months?: Yes  Have you used any prescription drugs other than prescribed in the past 12 months?: No  Is a toxicology screen needed?: Yes    Orientation  Orientation Level: Oriented X4    General Appearance  Motor Activity: Unremarkable  Speech Pattern: Slow  General Attitude: Guarded  Appearance/Hygiene: Unremarkable    Thought Process  [place]: [place] thinking  Content: Unremarkable  Delusions:  (None)  Perception: Not altered  Hallucination: None  Judgment/Insight: Limited  Confusion: None  Cognition: Impulsive    Sleep Pattern  Sleep Pattern: Restlessness    Risk Factors  Self Harm/Suicidal Ideation Plan: Overdose  Previous Self Harm/Suicidal Plans: Overdose  Risk Factors: Major mental illness, Male, Substance abuse, Personality disorder  (antisocial, borderline), Unemployment    Violence Risk Assessment  Thoughts of Harm to Others: No    Ability to Assess Risk Screen  Risk Screen - Ability to Assess: Able to be screened  [place] Suicide Severity Rating Scale (Screener/Recent Self-Report)  1. Wish to be Dead (Past 1 Month): Yes  2. Non-Specific Active Suicidal Thoughts (Past 1 Month): Yes  3. Active Suicidal Ideation with any Methods (Not Plan) Without Intent to Act (Past 1 Month): Yes  4. Active Suicidal Ideation with Some Intent to Act, Without Specific Plan (Past 1 Month): Yes  5. Active Suicidal Ideation with Specific Plan and Intent (Past 1 Month): Yes  6. Suicidal Behavior (Lifetime): Yes  6. Suicidal Behavior (3 Months): Yes  Calculated C-SSRS Risk Score (Lifetime/Recent): High Risk  Step 1: Risk Factors  Current & Past Psychiatric Dx: Mood disorder, Cluster B personality disorders or traits (i.e., borderline, antisocial, histrionic & narcissistic), Alcohol/substance abuse disorders  Presenting Symptoms: Impulsivity, Hopelessness or despair, Anxiety and/or panic  Precipitants/Stressors: Substance intoxication or withdrawal, Triggering events leading to humiliation, shame, and/or despair (e.g. loss of relationship, financial or health status) (real or anticipated), Chronic physical pain or other acute medical problem (e.g. CNS disorders)  Change in Treatment: Non-compliant or not receiving treatment  Access to Lethal Methods : No  Step 3: Suicidal Ideation Intensity  How Many Times Have You Had These Thoughts: Daily or almost daily  When You Have the Thoughts How Long do They Last : 1-4 hours/a lot of the time  Could/Can You Stop Thinking About Killing Yourself or Wanting to Die if You Want to: Can control thoughts with some difficulty  Are There Things - Anyone or Anything - That Stopped You From Wanting to Die or Acting on: Uncertain that deterrents stopped you  What Sort of Reasons Did You Have For Thinking About Wanting to Die or Killing  "Yourself: Equally to get attention, revenge or a reaction from others and to end/stop the pain  Total Score: 16  Step 5: Documentation  Risk Level: Moderate suicide risk (Pt a mdoerate risk of harm to self in the setting of inpatient psych)    Psychiatric Impression and Plan of Care  Assessment and Plan: On assessment pt is seen lying comfortably in his hospital bed. Pt presents A&O x4, demonstrating a concrete thought process. His speech is slow and at times delayed. Pt presents with a dysthymic mood, flat affect and is avoidant of eye contact throughout assessment. There are no loosening of associations or delusions present. Pt does not appear to be responding to any internal stimuli.      Pt states he is presenting to the ED due to suicidal ideation with a plan to “take a bunch of pills.” Pt states he took four 10mg Buspar prior to ED arrival in an attempt to harm himself. Still actively endorsing SI on assessment. Unsure that he would be able to keep himself safe if discharged from the ED. Pt stating, “I have nothing to live for.”      Pt denies HI and AVH on assessment. He denies NSSIB such as cutting or burning himself. Pt endorses decrease in sleep and appetite. States he has close to no energy and has had trouble taking care of himself.      Diagnostic Impression: Unspecified Mood Disorder      Psychiatric Recommendation and Plan for Care: Due to pt self-reporting suicide attempt, ED provider requesting psychiatric admission for further safety and stability. Discussed with Yo Dodson MD    Specific Resources Provided to Patient: Admission    Outcome/Disposition  Patient's Perception of Outcome Achieved: \"I need help\"  Assessment, Recommendations and Risk Level Reviewed with: Yo Dodson MD  EPAT Assessment Completed Date: 01/28/25  EPAT Assessment Completed Time: 1734  "

## 2025-01-28 NOTE — TELEPHONE ENCOUNTER
"Call returned and explained to pt that we cannot direct admit him because he has not been seen recently for evaluation (last visit 8/22/24) and he does not currently have a primary oncologist (appt 2/4 to establish care).  Given his current symptoms, ED evaluation is recommended.      Pt initially agreed stating he would call 911 to come to the ED.  He subsequently stated that he felt he would have to be admitted to a psychiatric unit because he is suicidal and that his other concerns would not be addressed.  Pt admits to \"not wanting to live this life\" and states he has a stash of pills he can take to end his life.  Pt advised that he should call 911 now or that I would need to do so for him if he is unwilling.  Lengthy discussion with pt that we as medical professionals are obligated to address both physical and mental health needs and that he would be best served in a hospital setting.  He subsequently retracted his statement and says he is not planning to take any pills now.  He has an appt today at 12pm to apply for food stamps that he doesn't want to miss and agreed that he would come to ED after that appt. In consultation with [facility], it was deemed safe to move forward with this plan.  "

## 2025-01-28 NOTE — ED PROVIDER NOTES
"CC: Suicide Attempt and g tube eval     HPI:  Patient is a 44-year-old male with a past medical history of schizoaffective disorder, polysubstance use, depression, and mouth cancer s/p chemotherapy and radiation who presented to the ED for suicide attempt.  Patient knows that life is not making him feel down.  Patient noted that he took \"a lot of his BuSpar \".  Patient will not state a number of pills that he took.  Noted that he took the BuSpar approximately 30 minutes before ED presentation.  Patient notes he has had difficulty eating and drinking secondary to the pain of his mouth cancer.  Patient would like to be evaluated for a G-tube.  Denied any other drug, alcohol, or illicit substance use.  Denied fevers, chills, chest pain, difficulty breathing, headache, trauma, falls, abdominal pain, neck pain, back pain, nausea, vomiting, and extremity pain.    Limitations to history: None  Independent historian(s): Patient  Records Reviewed: Recent available ED and inpatient notes reviewed in EMR.    PMHx/PSHx:  Per HPI.   - has a past medical history of Bipolar 1 disorder (Multi), Immunization not carried out for unspecified reason, and Other specified abnormal findings of blood chemistry.  - has no past surgical history on file.    Medications:  Reviewed in EMR. See EMR for complete list of medications and doses.    Allergies:  Quetiapine    Social History:  - Tobacco:  reports that he has been smoking. He uses smokeless tobacco.   - Alcohol:  reports current alcohol use.   - Illicit Drugs:  reports current drug use. Frequency: 3.00 times per week. Drug: Cocaine.     ROS:  Per HPI.       ???????????????????????????????????????????????????????????????  Triage Vitals:  T 37 °C (98.6 °F)  HR 84  /90  RR 18  O2 98 %      Physical Exam  Vitals and nursing note reviewed.   Constitutional:       General: He is not in acute distress.  HENT:      Head: Normocephalic and atraumatic.      Nose: Nose normal.      " Mouth/Throat:      Mouth: Mucous membranes are moist.   Eyes:      Conjunctiva/sclera: Conjunctivae normal.   Cardiovascular:      Rate and Rhythm: Normal rate and regular rhythm.      Pulses: Normal pulses.   Pulmonary:      Effort: Pulmonary effort is normal. No respiratory distress.      Breath sounds: Normal breath sounds.   Abdominal:      Palpations: Abdomen is soft.      Tenderness: There is no abdominal tenderness.   Skin:     General: Skin is warm.   Neurological:      General: No focal deficit present.      Mental Status: He is alert.   Psychiatric:      Comments: Noted suicide attempt with continued suicidal ideation, intent, and plan.  Denied HI or AVH.         ???????????????????????????????????????????????????????????????  Labs:   Labs Reviewed   CBC WITH AUTO DIFFERENTIAL - Abnormal       Result Value    WBC 6.3      nRBC 0.0      RBC 4.08 (*)     Hemoglobin 12.3 (*)     Hematocrit 35.0 (*)     MCV 86      MCH 30.1      MCHC 35.1      RDW 11.4 (*)     Platelets 228      Neutrophils % 78.2      Immature Granulocytes %, Automated 0.3      Lymphocytes % 10.2      Monocytes % 8.7      Eosinophils % 2.1      Basophils % 0.5      Neutrophils Absolute 4.93      Immature Granulocytes Absolute, Automated 0.02      Lymphocytes Absolute 0.64 (*)     Monocytes Absolute 0.55      Eosinophils Absolute 0.13      Basophils Absolute 0.03     COMPREHENSIVE METABOLIC PANEL - Abnormal    Glucose 101 (*)     Sodium 137      Potassium 4.7      Chloride 102      Bicarbonate 26      Anion Gap 14      Urea Nitrogen 16      Creatinine 0.96      eGFR >90      Calcium 9.7      Albumin 4.2      Alkaline Phosphatase 56      Total Protein 7.8      AST 25      Bilirubin, Total 0.3      ALT 8 (*)    DRUG SCREEN,URINE - Abnormal    Amphetamine Screen, Urine Presumptive Negative      Barbiturate Screen, Urine Presumptive Negative      Benzodiazepines Screen, Urine Presumptive Negative      Cannabinoid Screen, Urine Presumptive Negative       Cocaine Metabolite Screen, Urine Presumptive Positive (*)     Fentanyl Screen, Urine Presumptive Negative      Opiate Screen, Urine Presumptive Negative      Oxycodone Screen, Urine Presumptive Negative      PCP Screen, Urine Presumptive Negative      Methadone Screen, Urine Presumptive Positive (*)     Narrative:     Drug screen results are presumptive and should not be used to assess   compliance with prescribed medication. Contact the performing [facility] laboratory   to add-on definitive confirmatory testing if clinically indicated.    Toxicology screening results are reported qualitatively. The concentration must   be greater than or equal to the cutoff to be reported as positive. The concentration   at which the screening test can detect an individual drug or metabolite varies.   The absence of expected drug(s) and/or drug metabolite(s) may indicate non-compliance,   inappropriate timing of specimen collection relative to drug administration, poor drug   absorption, diluted/adulterated urine, or limitations of testing. For medical purposes   only; not valid for forensic use.    Interpretive questions should be directed to the laboratory medical directors.   ACUTE TOXICOLOGY PANEL, BLOOD - Normal    Acetaminophen <10.0      Salicylate  <3      Alcohol <10     CREATINE KINASE - Normal    Creatine Kinase 300          Imaging:   No orders to display        MDM:   Patient is a 44-year-old male with a past medical history of schizoaffective disorder, polysubstance use, depression, and mouth cancer s/p chemotherapy and radiation who presented to the ED for suicide attempt.  Patient presented HDS.  Physical exam findings significant for suicide attempt with continued suicidal ideation, intent, and plan.  Patient has been able to tolerate p.o.  Airway remains intact.  No focal neurologic deficits noted.  Low clinical concern for nonpsychiatric etiology of the patient's presentation including but not limited to acute  infectious process, metabolic process, and neurologic process.  G-tube not indicated at this time given that patient is able to tolerate p.o.  Psychiatric workup initiated with labs, EKG, and EPAT consultation.    ED Course:  ED Course as of 01/30/25 1727 Tue Jan 28, 2025   1650 Electrocardiogram, 12-lead  Interpreted by me.  1/28/2025 at 1641.  Sinus rhythm 77 bpm.  , QRS 90, QTc 473.  No ST elevation. []   1810 Drug Screen, Urine(!)  UDS positive for cocaine and methadone. []   1810 Cardiac Enzymes - CPK  CK normal at 300. []   1810 Comprehensive Metabolic Panel(!)  metqabolic panel unremarkable. []   1810 CBC and Auto Differential(!)  CBC with no leukocytosis and mild anemia with a hemoglobin of 12.3. []   1811 Acute Toxicology Panel, Blood  Acute tox panel negative. []      ED Course User Index  [] Brennan Watson MD  [] Yo Dodson MD         Diagnoses as of 01/30/25 1727   Suicidal ideation       Social Determinants Limiting Care:  None identified    Disposition:  EPAT noted that they will place the patient at [facility].  Patient notified.  All questions were answered.  Patient signed out to Dr. Clarke in stable condition pending transfer.    Yo Dodson MD   Emergency Medicine PGY-3  [facility]    Comment: Please note this report has been produced using speech recognition software and may contain errors related to that system including errors in grammar, punctuation, and spelling as well as words and phrases that may be inappropriate.  If there are any questions or concerns please feel free to contact the dictating provider for clarification.    Procedures ? Smart[employer] last updated 1/28/2025 6:35 PM        Yo Dodson MD  Resident  01/30/25 [id number]

## 2025-01-28 NOTE — ED TRIAGE NOTES
"Pt to ED with c/o pmh of mouth cancer and being unable to eat or drink anything d/t the pain. His md told him to come to the ED for eval for need for PEG tube. On way to ED pt reported taking \"a bunch of my buspar\" states he doesn't feel well .  "

## 2025-01-28 NOTE — TELEPHONE ENCOUNTER
"Pt called stating he is unable to eat due to mouth sores and requesting to be admitted.  He reports longstanding sores in his mouth with bleeding gums and that his teeth are all rotted.  He states he recently spent a month in a mental hospital and that when he is hospitalized is the only time he is able to be fed properly. He thinks he needs a feeding tube placed.    He's had 4 cans of Ensure in the past 24 hrs with no other fluid or food intake.  He is voiding yellow urine 4-5 times/day.  Last BM was yesterday.  No nausea or vomiting.  He endorses dry mouth and dizziness with an unwitnessed syncopal episode 2 day ago during which he hit the back of his head.  Pt denies headache.  He admits to current cocaine use, most recently 2 days ago.      He denies fevers, chest pain or difficulty breathing.    He does not have transportation available    Additional Information   Are there daily activities that you're having trouble with such as getting dressed or making meals?     Says \"I can't take care of myself\"   Commented on: How often do you brush your teeth and use mouthwash?     Does not brush teeth   Commented on: What else do you want to tell me about this problem?     No fever, no chest pain or difficulty breathing    Protocols used: Oral Mucositis (Mouth Sores)    "

## 2025-01-28 NOTE — SIGNIFICANT EVENT
Application for Emergency Admission      Ready for Transfer?  Is the patient medically cleared for transfer to inpatient psychiatry: Yes  Has the patient been accepted to an inpatient psychiatric hospital: Yes    Application for Emergency Admission  IN ACCORDANCE WITH [place]  The Chief Clinical Officer of: [facility] [place] 1/28/2025 .6:35 PM    Reason for Hospitalization  The undersigned has reason to believe that: Shayne Messer Is a mentally ill person subject to hospitalization by court order under division B Section 5122.01 of the Revised Code, i.e., this person:    1.Yes  Represents a substantial risk of physical harm to self as manifested by evidence of threats of, or attempts at, suicide or serious self-inflicted bodily harm    2.No Represents a substantial risk of physical harm to others as manifested by evidence of recent homicidal or other violent behavior, evidence of recent threats that place another in reasonable fear of violent behavior and serious physical harm, or other evidence of present dangerousness    3.No Represents a substantial and immediate risk of serious physical impairment or injury to self as manifested by  evidence that the person is unable to provide for and is not providing for the person's basic physical needs because of the person's mental illness and that appropriate provision for those needs cannot be made  immediately available in the community    4.Yes Would benefit from treatment in a hospital for his mental illness and is in need of such treatment as manifested by evidence of behavior that creates a grave and imminent risk to substantial rights of others or  himself.    5.No Would benefit from treatment as manifested by evidence of behavior that indicates all of the following:       (a) The person is unlikely to survive safely in the community without supervision, based on a clinical determination.       (b) The person has a history of lack of compliance  with treatment for mental illness and one of the following applies:      (i) At least twice within the thirty-six months prior to the filing of an affidavit seeking court-ordered treatment of the person under section 5122.111 of the Revised Code, the lack of compliance has been a significant factor in necessitating hospitalization in a hospital or receipt of services in a forensic or other mental health unit of a correctional facility, provided that the thirty-six-month period shall be extended by the length of any hospitalization or incarceration of the person that occurred within the thirty-six-month period.      (ii) Within the forty-eight months prior to the filing of an affidavit seeking court-ordered treatment of the person under section 5122.111 of the Revised Code, the lack of compliance resulted in one or more acts of serious violent behavior toward self or others or threats of, or attempts at, serious physical harm to self or others, provided that the forty-eight-month period shall be extended by the length of any hospitalization or incarceration of the person that occurred within the forty-eight-month period.      (c) The person, as a result of mental illness, is unlikely to voluntarily participate in necessary treatment.       (d) In view of the person's treatment history and current behavior, the person is in need of treatment in order to prevent a relapse or deterioration that would be likely to result in substantial risk of serious harm to the person or others.    (e) Represents a substantial risk of physical harm to self or others if allowed to remain at liberty pending examination.    Therefore, it is requested that said person be admitted to the above named facility.    STATEMENT OF BELIEF  Pt is seen lying comfortably in his hospital bed. Pt presents A&O x4, demonstrating a concrete thought process. His speech is slow and at times delayed. Pt presents with a dysthymic mood, flat affect and is  avoidant of eye contact throughout assessment. There are no loosening of associations or delusions present. Pt does not appear to be responding to any internal stimuli.       Pt states he is presenting to the ED due to suicidal ideation with a plan to “take a bunch of pills.” Pt states he took four 10mg Buspar prior to ED arrival in an attempt to harm himself. Still actively endorsing SI on assessment. Unsure that he would be able to keep himself safe if discharged from the ED. Pt stating, “I have nothing to live for.”       Pt denies HI and AVH on assessment. He denies NSSIB such as cutting or burning himself. Pt endorses decrease in sleep and appetite. States he has close to no energy and has had trouble taking care of himself.  Given suicide attempt, patient requires inpatient psychiatric hospitalization.    Yo Dodson MD 1/28/2025     _____________________________________________________________   Place of Employment: [facility]    STATEMENT OF OBSERVATION BY PSYCHIATRIST, LICENSED PHYSICIAN, OR LICENSED CLINICAL PSYCHOLOGIST, IF APPLICABLE    Place of Observation (e.g., [facility], general hospital, office, emergency facility)  (If applicable, please complete)    Yo Dodson MD 1/28/2025    _____________________________________________________________

## 2025-01-28 NOTE — ED NOTES
Pt provided information to [facility] that he took 4 (10mg) buspar around 1-2 pm. Referral made to poison control by this RN. Per poison control, therapeutic daily dose can be up to 60mg without complications. In large amounts the only side effect would be drowsiness and observation for four hours from time of ingestion would all that would be required. Pt at this time is considered medically cleared once the urine drug screen in returned.        Janel Mariee RN  01/28/25 0285       Janel Mariee RN  01/28/25 9955

## 2025-01-29 VITALS
HEART RATE: 79 BPM | RESPIRATION RATE: 16 BRPM | DIASTOLIC BLOOD PRESSURE: 73 MMHG | TEMPERATURE: 98.4 F | WEIGHT: 170 LBS | BODY MASS INDEX: 25.18 KG/M2 | HEIGHT: 69 IN | OXYGEN SATURATION: 96 % | SYSTOLIC BLOOD PRESSURE: 135 MMHG

## 2025-01-29 LAB
ATRIAL RATE: 77 BPM
P AXIS: 64 DEGREES
P OFFSET: 200 MS
P ONSET: 137 MS
PR INTERVAL: 164 MS
Q ONSET: 219 MS
QRS COUNT: 13 BEATS
QRS DURATION: 90 MS
QT INTERVAL: 418 MS
QTC CALCULATION(BAZETT): 473 MS
QTC FREDERICIA: 454 MS
R AXIS: 26 DEGREES
T AXIS: 42 DEGREES
T OFFSET: 428 MS
VENTRICULAR RATE: 77 BPM

## 2025-01-29 SDOH — HEALTH STABILITY: MENTAL HEALTH: BEHAVIORAL HEALTH(WDL): WITHIN DEFINED LIMITS

## 2025-01-29 ASSESSMENT — PAIN SCALES - GENERAL: PAINLEVEL_OUTOF10: 0 - NO PAIN

## 2025-01-30 ENCOUNTER — TELEPHONE (OUTPATIENT)
Dept: RADIATION ONCOLOGY | Facility: HOSPITAL | Age: 45
End: 2025-01-30
Payer: MEDICAID

## 2025-01-30 NOTE — PROGRESS NOTES
Emergency Medicine Transition of Care Note.    I received Shayne Messer in signout from Dr. Dodson.  Please see the previous ED provider note for all HPI, PE and MDM up to the time of signout at 1900. This is in addition to the primary record.    In brief Shayne Messer is an 44 y.o. male presenting for suicidal ideations  Chief Complaint   Patient presents with    Suicide Attempt    g tube eval     At the time of signout we were awaiting: Transfer to [facility]    ED Course as of 01/30/25 0341 Tue Jan 28, 2025   1650 Electrocardiogram, 12-lead  Interpreted by me.  1/28/2025 at 1641.  Sinus rhythm 77 bpm.  , QRS 90, QTc 473.  No ST elevation. []   1810 Drug Screen, Urine(!)  UDS positive for cocaine and methadone. []   1810 Cardiac Enzymes - CPK  CK normal at 300. []   1810 Comprehensive Metabolic Panel(!)  metqabolic panel unremarkable. []   1810 CBC and Auto Differential(!)  CBC with no leukocytosis and mild anemia with a hemoglobin of 12.3. []   1811 Acute Toxicology Panel, Blood  Acute tox panel negative. []      ED Course User Index  [MC] Brennan Watson MD  [] Yo Dodson MD         Diagnoses as of 01/30/25 0341   Suicidal ideation       Medical Decision Making  Patient is a 44-year-old male who presented with suicidal ideations.  He was medically cleared.  He was eval by [facility] and determined to meet inpatient criteria.  He has been accepted to [facility].  He was signed out to me pending transfer.  Patient was uneventful during my shift and patient was transferred to [facility] without any complications.        Final diagnoses:   [R45.851] Suicidal ideation           Procedure  Procedures    Darien Clarke DO

## 2025-02-18 ENCOUNTER — APPOINTMENT (OUTPATIENT)
Dept: HEMATOLOGY/ONCOLOGY | Facility: HOSPITAL | Age: 45
End: 2025-02-18
Payer: MEDICAID

## 2025-02-18 ENCOUNTER — APPOINTMENT (OUTPATIENT)
Dept: RADIATION ONCOLOGY | Facility: HOSPITAL | Age: 45
End: 2025-02-18
Payer: MEDICAID

## 2025-02-19 ENCOUNTER — APPOINTMENT (OUTPATIENT)
Dept: BEHAVIORAL HEALTH | Facility: HOSPITAL | Age: 45
End: 2025-02-19
Payer: MEDICAID

## 2025-02-21 NOTE — PROGRESS NOTES
Patient ID: Shayne Messer is a 44 y.o. male.    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 Kyunghee Burkitt  Rad/onc: Dr. Valdez  ENT: Dr Jerome  Treatment:  - cisplatin 100mg/m2 q3wk with RT     Oncology history   - 11/11/2022: enlarging right tonsillar mass and RT neck mass x 6 months  - 11/22/2022: triple scope, Enlarged and indurated right tonsil with extension of abnormal appearing tissue onto the soft palate. No extension to BOT and no separate lesions or masses identified; biopsy of right tonsillar mass: invasive p16+ SCC       Past Medical History:   Past Medical History:  No date: Bipolar 1 disorder (Multi)  No date: Immunization not carried out for unspecified reason      Comment:  COVID-19 vaccination not done  No date: Other specified abnormal findings of blood chemistry      Comment:  High serum cholestanol   Surgical History:    No past surgical history on file.   Family History:    No family history on file.  Family Oncology History:    Cancer-related family history is not on file.  Social History:    Social History     Tobacco Use    Smoking status: Every Day    Smokeless tobacco: Current    Tobacco comments:     Vaping.   Vaping Use    Vaping status: Never Used   Substance Use Topics    Alcohol use: Yes     Comment: drank yesterday    Drug use: Yes     Frequency: 3.0 times per week     Types: Cocaine          Subjective   Chief Complaint:     HPI  -He was in ED in June due to paranoia and hallucination secondary to cocaine use.  He sees psych in sept.  -He reports  his mouth pain is slightly better, but pain is still there mostly back of mouth, currently on Methadone  -He sees psych in Sept.  -His wt is stable, he takes 4 boost daily, still can't eat solid food.  -He still has sever dry mouth, hard to chew.    -He also has lots of broken teeth.  He sees dentist on 3/29 for teeth extraction.  -Denies dysphagia, SOB, fever, chills.       PmHx:  Depression with suicidal ideation in the past requiring hospital admission.  Nothing that he reports in the last 6 months.    Cocaine abuse (snort), currently sober.  Daily smoker.   Self-reported ADHD.       ROS  Review of Systems - Oncology    Allergies  Allergies   Allergen Reactions    Quetiapine Swelling     Swelling of the neck.        Medications  Current Outpatient Medications   Medication Instructions    busPIRone (Buspar) 10 mg tablet     chlorhexidine (Peridex) 0.12 % solution 15 mL, Mouth/Throat, As needed, DO NOT SWALLOW    chlorproMAZINE (Thorazine) 100 mg tablet 4 tablets, Nightly    chlorproMAZINE (Thorazine) 50 mg tablet Every morning    lamoTRIgine (LaMICtal) 25 mg tablet     lisinopril 20 mg, Every morning    methadone (Dolophine) 10 mg tablet     mirtazapine (Remeron) 30 mg tablet     nicotine polacrilex (NICORETTE) 2 mg, Mouth/Throat, Every 2 hour PRN    omeprazole (PRILOSEC) 40 mg, oral, Daily    risperiDONE (RisperDAL) 3 mg tablet     sertraline (ZOLOFT) 50 mg, Every morning          Objective   VS: There were no vitals taken for this visit.  Weight: Daily Weight  01/28/25 : 77.1 kg (170 lb)  12/16/24 : 77.1 kg (170 lb)  09/26/24 : 77.1 kg (170 lb)  09/25/24 : 77.8 kg (171 lb 9.6 oz)  09/10/24 : 70.9 kg (156 lb 3.2 oz)  08/22/24 : 74.5 kg (164 lb 3.9 oz)  08/07/24 : 72.6 kg (160 lb)      Physical Exam  Constitutional:       Appearance: Normal appearance.   HENT:      Head: Normocephalic and atraumatic.      Mouth/Throat:      Mouth: Mucous membranes are moist.      Comments: Multiple rotten and broken bottom teeth.  Eyes:      Extraocular Movements: Extraocular movements intact.      Pupils: Pupils are equal, round, and reactive to light.   Cardiovascular:      Rate and Rhythm: Normal rate and regular rhythm.   Pulmonary:      Effort: Pulmonary effort is normal.      Breath sounds: Normal breath sounds.   Musculoskeletal:         General: Normal range of motion.      Cervical back:  Normal range of motion and neck supple.   Skin:     General: Skin is warm.   Neurological:      General: No focal deficit present.      Mental Status: He is alert and oriented to person, place, and time.         Diagnostic Results   Labs  Below labs are reviewed today.                               Images       Pathology  Lab Results   Component Value Date    PATHREP  11/22/2022     Name BABAK GROSS                                                                                                   Accession #: [id number]            Pathologist:                   WADAD S. MNEIMNEH, MD  Date of Procedure:    11/22/2022  Date Received:          11/22/2022  Date Reported           11/28/2022  Submitting Physician:   OLGA KOEHLER MD  Location:                    [facility]  Other External #                                                                    FINAL DIAGNOSIS  A.  RIGHT TONSIL, BIOPSY:    -- INVASIVE P16-POSITIVE SQUAMOUS CELL CARCINOMA, MINIMALLY REPRESENTED (SEE  NOTE).    Note: Minute foci of nonkeratinizing squamous cell carcinoma are noted,  strongly diffusely positive for p16 immunostain.    P16 by immunohistochemistry:  Positive    Reference Range:  Negative:  <50% strong nuclear and cytoplasmic invasive tumor cell staining  Equivocal: 50-70% strong nuclear and cytoplasmic invasive tumor cell staining   Positive: >70% strong nuclear and cytoplasmic invasive tumor cell staining     Findings received by Dr Murphy on Nov 24, 2022, at 6:50 AM    B.  SUPERIOR RIGHT TONSIL, BIOPSY:  -- SQUAMOUS MUCOSA AND ASSOCIATED LYMPHOID TISSUE WITH FOLLICULAR HYPERPLASIA.   -- NO CARCINOMA IDENTIFIED.    Note: Immunostain for p16 performed on block B1 shows focal, patchy, weak  nonspecific staining in the squamous epithelium.       C.  RIGHT TONSIL, BIOPSY # 1:    -- SQUAMOUS MUCOSA AND ASSOCIATED LYMPHOID TISSUE WITH FOLLICULAR HYPERPLASIA  (SEE NOTE).  -- NO CARCINOMA IDENTIFIED.    Note: Multiple levels were  examined.    D.  RIGHT TONSIL BIOPSY # 2:    -- SQUAMOUS MUCOSA WITH FOCAL LYMPHOID TISSUE.  -- NO CARCINOMA IDENTIFIED.                                                                                                                                                                                                                                                                                                                                                                                                                                                                                       Electronically Signed Out By WADAD S. MNEIMNEH, MD/DUNG  By the signature on this report, the individual or group listed as making the  Final Interpretation/Diagnosis certifies that they have reviewed this case.  Diagnostic interpretation performed at [facility] [address] [facility] [id number]         Intraoperative Consultation:  A: RIGHT TONSIL  BIOPSY    Frozen Section 1:  Date Ordered: 11/22/2022 08:59     Date Received: 11/22/2022 08:59     Date  Called: 11/22/2022 09:38    Intraoperative Diagnosis:  A) Right tonsil biopsy (FSA1): Small detached fragment of atypical cells,  suspicious for carcinoma but not diagnostic; requested additional tissue.    : Dr. Mckenzie Cornejo    Intraoperative Consult Pathologist(s):  VENECIA KEYS MD (P)    B: SUPERIOR RIGHT TONSIL    Frozen Section 1:  Date Ordered: 11/22/2022 09:46     Date Received: 11/22/2022 09:46     Date  Called: 11/22/2022 10:02    Intraoperative Diagnosis:  B) Superior right tonsil (FSB1): Benign tonsillar tissue.  Intraoperative Consult Pathologist(s):  VENECIA KEYS MD (P)      [facility]/11/22/2022           Clinical History:  Squamous cell carcinoma    Specimens Submitted As:  A: RIGHT TONSIL  BIOPSY   B: SUPERIOR RIGHT TONSIL   C: RIGHT TONSIL BIOPSY # 1   D: RIGHT TONSIL BIOPSY # 2     Gross Description:  A. Received fresh for  "intraoperative consultation, labeled with the patient's  name and hospital number and \"right tonsil biopsy\", are multiple tan-red soft  tissue fragments measuring in aggregate 0.8 x 0.8 x 0.2 cm. The specimen is  entirely submitted for intraoperative diagnosis and then for permanent in one  cassette, A1 FSC. RXA/RXH    B. Received fresh for intraoperative consultation, labeled with the patient's  name and hospital number and \"superior right tonsil\", are multiple tan-red soft  tissue fragments measuring in aggregate 0.7 x 0.5 x 0.2 cm. The specimen is  entirely submitted for intraoperative diagnosis and then for permanent in one  cassette, B1 FSC. RXA/RXH    C: Received in formalin, labeled with the patient's name and hospital number  and \"right tonsil biopsy #1\", is a tan soft tissue fragment measuring 0.4 x 0.4  x 0.2 cm. The specimen is submitted in toto in one cassette, C1. SBS    D:  Received in formalin, labeled with the patient's name and hospital number  and \"right tonsil biopsy #2\", is a tan soft tissue fragment measuring 0.9 x 0.7  x 0.5 cm. The specimen is submitted in toto in one cassette, D1. SBS      rxh/11/22/2022           The assays/tests were performed with appropriate positive and negative controls  which stained appropriately.    [facility]  Department of Pathology   [address] [place] [address]        PATHREP  09/02/2016     Name BABAK GROSS                                                                                                   Accession #: [id number]            Pathologist:                     Date of Procedure:    9/2/2016  Date Received:          9/6/2016  Date Reported           9/7/2016  Submitting Physician:     REQUESTING PROVIDER: PRITI العلي  Location:                      Other External #                                                                    FINAL DIAGNOSIS  DIAGNOSIS:  (A)  Biopsy of esophagus:     -  " Fragments of benign gastroesophageal mucosa with moderate        chronic active inflammation and foci of ulcer debris.     -  Goblet cell metaplasia is not demonstrated.     -  Dysplasia is not seen.    (B)  Biopsy of antrum:    -  Fragments of benign gastric antral/fundal mucosa with features       of chemical (reactive) gastropathy.    -  H. pylori organisms are not demonstrated (Giemsa stain).             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     -  Please see Comment.    Note  COMMENT:  (A)  Viral nuclear inclusions and hyphal elements are not demonstrated.    (B)  Clinical correlation is requested with origin of the biopsy from the    incisura region where goblet cell metaplasia is a normal finding.                                                                                                                                                                                                                                                                                                                                                                                                                                                                                        By the signature on this report, the individual or group listed as making the  Final Interpretation/Diagnosis certifies that they have reviewed this case.  PROFESSIONAL CODES:  CPT:  88305 x 2, 31087    ICD10:  K92.2, K92.0, K21.0, K29.60    ALEA SMALLS MD    (Electronically signed by)    Verified: 09/07/16    IA/JENNIFER             Microscopic Description:  MICROSCOPIC DESCRIPTION:  Microscopic slides examined.    [facility]/JENNIFER      Clinical History:  SPECIMEN SOURCE:  (A)  Biopsy of esophagus    (B)  Biopsy of antrum    CLINICAL INFORMATION:  Upper GI bleed and hematemesis      Specimens Submitted As:  A: Esophageal biopsy   B: Antral biopsy       Other Related Clinical Data  PROFESSIONAL CODES:  CPT:  88305 x 2, 59670    ICD10:  K92.2, K92.0,  K21.0, K29.60    ALEA SMALLS MD    (Electronically signed by)    Verified: 09/07/16    [facility]  Gross Description:  GROSS DESCRIPTION:  (A)  Received in formalin are four fragments of pink-tan mucosa-covered soft    tissue measuring 0.3 x 0.2 x 0.2 cm. in aggregate. The soft tissue is grossly    unremarkable and entirely submitted for routine microscopic evaluation.    (B)  Received in formalin is a single fragment of pink-tan mucosa-covered soft    tissue measuring 0.2 x 0.2 x 0.2 cm. The soft tissue is grossly unremarkable    and entirely submitted for routine microscopic evaluation.    [facility]  Department of Pathology   [address] [place] [address]             Assessment/Plan   BABAK GROSS is a 42 year old Male with history of polysubstance abuse (snort cocaine, tobacco), recent found locally advanced OPSCC (p16+), started chemo rad on  1/11.     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   - s/p chemoradiation 1/11-2/28/22  - 3/8/23:  labs are stable Na wnl, he still drink and eat ok by mouth, mucositis related pain is tolerable with current meds. Pain med is managed by supp onc, wt loss due to pain.  Pt was over-using both BMX and dex, reinforced patient about appropriate  use of all the meds.  Pain meds are strictly managed by supp onc.  - 6/1/23: PET/CT showed very good response. Near complete interval resolution of hypermetabolic activity  within bilateral cervical lymph node stations. Mild residual josse activity within a left cervical lymph node is indeterminate in nature and residual viable  malignancy cannot be excluded. Persistent mouth pain mostly only with eating, some wt loss. Encouraged  pt to continue increase boost intake for extra nutritional support and f/u with dietitian.  I will arrange repeat CT neck with contrast in 3 months to f/u on his residual josse activity.  -6/29/23: swelling in submandibular & bilateral posterior neck tightness/pain, repeat CT neck on 6/18 showed no new lesions, left cervical LN was the same size as seen in 6/1 PET/CT.  Swelling is likely post treatment related, but advised pt to call us  if swelling or neck tightness/pain worsens.   -9/14/23: ongoing wt issue due to mouth pain, fluid reflux from nostril with drinking. Seen by speech, supp onc and dietitian today. CT neck will be rescheduled to f/u on residual josse activity seen in 6/1/23.  -9/23/23: CT neck showed stable right level 2 lymph node  -11/9/23: ongoing dry mouth with rotten/broken teeth, clear discharge from both ear canal with decreased hearing.  He will see ENT next week and also dentist.  -12/7/23: ongoing dry mouth with rotten/broken teeth, insurance issues related to dental work up, seen by  SW today.   -3/14/24: restaging scan CT n/c from 3/8 showed IRENE.  Wt stable on daily protein drinks, ongoing mouth pain but less, stable stiff neck. No new symptoms.  Sees dentist on 3/29 for teeth extraction  -8/22/24: Wt stable, ongoing mouth pain, sees supp onc, also scheduled to see psych in Sept.  I told patient that I am leaving , he will continue follow up with Rad onc and one of our HN provider.    Plan:  -RTC in 3 months with labs on the same day  -Continue f/u with Rad onc, dietitian and supp onc

## 2025-02-25 ENCOUNTER — TELEPHONE (OUTPATIENT)
Dept: HEMATOLOGY/ONCOLOGY | Facility: CLINIC | Age: 45
End: 2025-02-25
Payer: MEDICAID

## 2025-02-25 ENCOUNTER — APPOINTMENT (OUTPATIENT)
Dept: HEMATOLOGY/ONCOLOGY | Facility: HOSPITAL | Age: 45
End: 2025-02-25
Payer: MEDICAID

## 2025-02-25 NOTE — TELEPHONE ENCOUNTER
Patient was scheduled for 10:40 virtual visit. Link sent to phone and email, called patient phone listed in chart and Dr. Zavala (requesting provider from transfer call last week) without answer.    Patient without evidence of disease at last visit. He is welcome to follow when available should he desire. Please obtain CT soft tissue neck with IV contrast prior to visit.     Yo Lynne MD  Thoracic + [facility]   [address] [place], [place] [address]  Phone: [phone]

## 2025-03-03 LAB
ANION GAP SERPL CALCULATED.3IONS-SCNC: 13 MMOL/L (ref 7–16)
BUN SERPL-MCNC: 13 MG/DL (ref 6–20)
CALCIUM SERPL-MCNC: 9.4 MG/DL (ref 8.6–10.2)
CHLORIDE SERPL-SCNC: 103 MMOL/L (ref 98–107)
CO2 SERPL-SCNC: 25 MMOL/L (ref 22–29)
CREAT SERPL-MCNC: 1.2 MG/DL (ref 0.7–1.2)
ERYTHROCYTE [DISTWIDTH] IN BLOOD BY AUTOMATED COUNT: 11.5 % (ref 11.5–15)
GFR, ESTIMATED: 79 ML/MIN/1.73M2
GLUCOSE SERPL-MCNC: 76 MG/DL (ref 74–99)
HCT VFR BLD AUTO: 40.4 % (ref 37–54)
HGB BLD-MCNC: 12.9 G/DL (ref 12.5–16.5)
MCH RBC QN AUTO: 29.7 PG (ref 26–35)
MCHC RBC AUTO-ENTMCNC: 31.9 G/DL (ref 32–34.5)
MCV RBC AUTO: 93.1 FL (ref 80–99.9)
PLATELET # BLD AUTO: 409 K/UL (ref 130–450)
PMV BLD AUTO: 9.2 FL (ref 7–12)
POTASSIUM SERPL-SCNC: 5.1 MMOL/L (ref 3.5–5)
RBC # BLD AUTO: 4.34 M/UL (ref 3.8–5.8)
SODIUM SERPL-SCNC: 141 MMOL/L (ref 132–146)
WBC OTHER # BLD: 5.7 K/UL (ref 4.5–11.5)

## 2025-03-26 ENCOUNTER — NUTRITION (OUTPATIENT)
Dept: HEMATOLOGY/ONCOLOGY | Facility: HOSPITAL | Age: 45
End: 2025-03-26
Payer: MEDICAID

## 2025-03-26 NOTE — PROGRESS NOTES
NUTRITION COMMUNICATION NOTE    Shayne Messre     REASON FOR COMMUNICATION:     Pt called and wanted me to have his new address to share with Donna  He is also telling me he is out of his ONS and would like a shipment    New address:  [address]  [address]    Ordered Ensure Plus- vanilla  He receives 160 cartons per month  5.3 cartons per day    Will follow up as needed            
none

## 2025-03-31 ENCOUNTER — NUTRITION (OUTPATIENT)
Dept: HEMATOLOGY/ONCOLOGY | Facility: HOSPITAL | Age: 45
End: 2025-03-31
Payer: MEDICAID

## 2025-03-31 NOTE — PROGRESS NOTES
"NUTRITION COMMUNICATION NOTE    Shayne Messer     REASON FOR COMMUNICATION:     Pt called today and left a VM  He is concerned that his order from [facility] has not yet arrived   I had called them on 3- (Wednesday) regarding this order and address change  [facility] was called to check on status  Was on hold for > 5 minutes  Was able to speak with someone who reports he should receive his supplements tomorrow  Attempted to call pt back  Line was busy  Will try again     Wt Readings from Last 10 Encounters:   01/28/25 77.1 kg (170 lb)   12/16/24 77.1 kg (170 lb)   09/26/24 77.1 kg (170 lb)   09/25/24 77.8 kg (171 lb 9.6 oz)   09/10/24 70.9 kg (156 lb 3.2 oz)   08/22/24 74.5 kg (164 lb 3.9 oz)   08/07/24 72.6 kg (160 lb)   06/13/24 73.5 kg (162 lb)   05/29/24 75.1 kg (165 lb 9.1 oz)   05/15/24 71.1 kg (156 lb 11.2 oz)       Lab Results   Component Value Date/Time    GLUCOSE 101 (H) 01/28/2025 1548     01/28/2025 1548    K 4.7 01/28/2025 1548     01/28/2025 1548    CO2 26 01/28/2025 1548    ANIONGAP 14 01/28/2025 1548    BUN 16 01/28/2025 1548    CREATININE 0.96 01/28/2025 1548    EGFR >90 01/28/2025 1548    CALCIUM 9.7 01/28/2025 1548    ALBUMIN 4.2 01/28/2025 1548    ALKPHOS 56 01/28/2025 1548    PROT 7.8 01/28/2025 1548    AST 25 01/28/2025 1548    BILITOT 0.3 01/28/2025 1548    ALT 8 (L) 01/28/2025 1548    MG 2.18 11/01/2024 2137    PHOS 4.8 06/18/2023 0756     No results found for: \"VITD25\"      He is due back in clinic 4/8/2025 with med onc and will plan on seeing him then          "

## 2025-04-02 ENCOUNTER — NUTRITION (OUTPATIENT)
Dept: HEMATOLOGY/ONCOLOGY | Facility: HOSPITAL | Age: 45
End: 2025-04-02
Payer: MEDICAID

## 2025-04-02 NOTE — PROGRESS NOTES
NUTRITION COMMUNICATION NOTE    Shayne Messer     REASON FOR COMMUNICATION:     Pt called again on 4/1/2025 at 2:15 p leaving a VM stating ONS still hasn't arrived from [place]  I called Donna again on 4/2/2025 at 9am to discuss delivery  I was on hold for >5 minutes  Donna tells me it was delivered 4/1/202 at 3;30 p delivered   Did not call pt back -     Wt Readings from Last 10 Encounters:   01/28/25 77.1 kg (170 lb)   12/16/24 77.1 kg (170 lb)   09/26/24 77.1 kg (170 lb)   09/25/24 77.8 kg (171 lb 9.6 oz)   09/10/24 70.9 kg (156 lb 3.2 oz)   08/22/24 74.5 kg (164 lb 3.9 oz)   08/07/24 72.6 kg (160 lb)   06/13/24 73.5 kg (162 lb)   05/29/24 75.1 kg (165 lb 9.1 oz)   05/15/24 71.1 kg (156 lb 11.2 oz)   {

## 2025-04-08 ENCOUNTER — TELEMEDICINE (OUTPATIENT)
Dept: HEMATOLOGY/ONCOLOGY | Facility: HOSPITAL | Age: 45
End: 2025-04-08
Payer: MEDICAID

## 2025-04-08 DIAGNOSIS — C76.0 HEAD AND NECK CANCER (MULTI): Primary | ICD-10-CM

## 2025-04-08 PROCEDURE — 99212 OFFICE O/P EST SF 10 MIN: CPT | Performed by: STUDENT IN AN ORGANIZED HEALTH CARE EDUCATION/TRAINING PROGRAM

## 2025-04-08 NOTE — PROGRESS NOTES
Patient ID: Shayne Messer is a 44 y.o. male.    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 Kyunghee Burkitt  Rad/onc: Dr. Valdez  ENT: Dr Jerome  Treatment:  - cisplatin 100mg/m2 q3wk with RT     Oncology history   - 11/11/2022: enlarging right tonsillar mass and RT neck mass x 6 months  - 11/22/2022: triple scope, Enlarged and indurated right tonsil with extension of abnormal appearing tissue onto the soft palate. No extension to BOT and no separate lesions or masses identified; biopsy of right tonsillar mass: invasive p16+ SCC       Past Medical History:   Past Medical History:  No date: Bipolar 1 disorder (Multi)  No date: Immunization not carried out for unspecified reason      Comment:  COVID-19 vaccination not done  No date: Other specified abnormal findings of blood chemistry      Comment:  High serum cholestanol   Surgical History:    No past surgical history on file.   Family History:    No family history on file.  Family Oncology History:    Cancer-related family history is not on file.  Social History:    Social History     Tobacco Use   • Smoking status: Every Day   • Smokeless tobacco: Current   • Tobacco comments:     Vaping.   Vaping Use   • Vaping status: Never Used   Substance Use Topics   • Alcohol use: Yes     Comment: drank yesterday   • Drug use: Yes     Frequency: 3.0 times per week     Types: Cocaine          Subjective   Chief Complaint:     HPI  Virtual or Telephone Consent    While technically available, the patient was unable or unwilling to consent to connect via audio/video telehealth technology; therefore, I performed this visit using a real-time audio only connection between Shayne Houstonalex & Yo Lynne MD.  Verbal consent was requested and obtained from Shayne Messer on this date, 04/08/25 for a telehealth visit and the patient's location was confirmed at the time of the visit.       Patient currently living in group  "home in [place] after suicide ideation. He's doing \"not great\". No somatic complaints.    PmHx:  Depression with suicidal ideation in the past requiring hospital admission.  Nothing that he reports in the last 6 months.    Cocaine abuse (snort), currently sober.  Daily smoker.   Self-reported ADHD.       ROS  Review of Systems - Oncology    Allergies  Allergies   Allergen Reactions   • Quetiapine Swelling     Swelling of the neck.        Medications  Current Outpatient Medications   Medication Instructions   • busPIRone (Buspar) 10 mg tablet    • chlorhexidine (Peridex) 0.12 % solution 15 mL, Mouth/Throat, As needed, DO NOT SWALLOW   • chlorproMAZINE (Thorazine) 100 mg tablet 4 tablets, Nightly   • chlorproMAZINE (Thorazine) 50 mg tablet Every morning   • lamoTRIgine (LaMICtal) 25 mg tablet    • lisinopril 20 mg, Every morning   • methadone (Dolophine) 10 mg tablet    • mirtazapine (Remeron) 30 mg tablet    • nicotine polacrilex (NICORETTE) 2 mg, Mouth/Throat, Every 2 hour PRN   • omeprazole (PRILOSEC) 40 mg, oral, Daily   • risperiDONE (RisperDAL) 3 mg tablet    • sertraline (ZOLOFT) 50 mg, Every morning          Objective   VS: There were no vitals taken for this visit.  Weight: Daily Weight  01/28/25 : 77.1 kg (170 lb)  12/16/24 : 77.1 kg (170 lb)  09/26/24 : 77.1 kg (170 lb)  09/25/24 : 77.8 kg (171 lb 9.6 oz)  09/10/24 : 70.9 kg (156 lb 3.2 oz)  08/22/24 : 74.5 kg (164 lb 3.9 oz)  08/07/24 : 72.6 kg (160 lb)      Diagnostic Results   Labs  Below labs are reviewed today.                               Images       Pathology  Lab Results   Component Value Date    PATHREP  11/22/2022     Name BABAK GROSS                                                                                                   Accession #: [id number]            Pathologist:                   WADAD S. MNEIMNEH, MD  Date of Procedure:    11/22/2022  Date Received:          11/22/2022  Date Reported           11/28/2022  Submitting " Physician:   OLGA KOEHLER MD  Location:                    OR  Other External #                                                                    FINAL DIAGNOSIS  A.  RIGHT TONSIL, BIOPSY:    -- INVASIVE P16-POSITIVE SQUAMOUS CELL CARCINOMA, MINIMALLY REPRESENTED (SEE  NOTE).    Note: Minute foci of nonkeratinizing squamous cell carcinoma are noted,  strongly diffusely positive for p16 immunostain.    P16 by immunohistochemistry:  Positive    Reference Range:  Negative:  <50% strong nuclear and cytoplasmic invasive tumor cell staining  Equivocal: 50-70% strong nuclear and cytoplasmic invasive tumor cell staining   Positive: >70% strong nuclear and cytoplasmic invasive tumor cell staining     Findings received by Dr Murphy on Nov 24, 2022, at 6:50 AM    B.  SUPERIOR RIGHT TONSIL, BIOPSY:  -- SQUAMOUS MUCOSA AND ASSOCIATED LYMPHOID TISSUE WITH FOLLICULAR HYPERPLASIA.   -- NO CARCINOMA IDENTIFIED.    Note: Immunostain for p16 performed on block B1 shows focal, patchy, weak  nonspecific staining in the squamous epithelium.       C.  RIGHT TONSIL, BIOPSY # 1:    -- SQUAMOUS MUCOSA AND ASSOCIATED LYMPHOID TISSUE WITH FOLLICULAR HYPERPLASIA  (SEE NOTE).  -- NO CARCINOMA IDENTIFIED.    Note: Multiple levels were examined.    D.  RIGHT TONSIL BIOPSY # 2:    -- SQUAMOUS MUCOSA WITH FOCAL LYMPHOID TISSUE.  -- NO CARCINOMA IDENTIFIED.                                                                                                                                                                                                                                                                                                                                                                                                                                                                                       Electronically Signed Out By WADAD S. MNEIMNEH, MD/WSMARY  By the signature on this report, the individual or group listed as  "making the  Final Interpretation/Diagnosis certifies that they have reviewed this case.  Diagnostic interpretation performed at [facility] [address] [facility] [address]         Intraoperative Consultation:  A: RIGHT TONSIL  BIOPSY    Frozen Section 1:  Date Ordered: 11/22/2022 08:59     Date Received: 11/22/2022 08:59     Date  Called: 11/22/2022 09:38    Intraoperative Diagnosis:  A) Right tonsil biopsy (FSA1): Small detached fragment of atypical cells,  suspicious for carcinoma but not diagnostic; requested additional tissue.    : Dr. Mckenzie Cornejo    Intraoperative Consult Pathologist(s):  VENECIA KEYS MD (P)    B: SUPERIOR RIGHT TONSIL    Frozen Section 1:  Date Ordered: 11/22/2022 09:46     Date Received: 11/22/2022 09:46     Date  Called: 11/22/2022 10:02    Intraoperative Diagnosis:  B) Superior right tonsil (FSB1): Benign tonsillar tissue.  Intraoperative Consult Pathologist(s):  VENECIA KEYS MD (P)      [facility]/11/22/2022           Clinical History:  Squamous cell carcinoma    Specimens Submitted As:  A: RIGHT TONSIL  BIOPSY   B: SUPERIOR RIGHT TONSIL   C: RIGHT TONSIL BIOPSY # 1   D: RIGHT TONSIL BIOPSY # 2     Gross Description:  A. Received fresh for intraoperative consultation, labeled with the patient's  name and hospital number and \"right tonsil biopsy\", are multiple tan-red soft  tissue fragments measuring in aggregate 0.8 x 0.8 x 0.2 cm. The specimen is  entirely submitted for intraoperative diagnosis and then for permanent in one  cassette, A1 FSC. RXA/RXH    B. Received fresh for intraoperative consultation, labeled with the patient's  name and hospital number and \"superior right tonsil\", are multiple tan-red soft  tissue fragments measuring in aggregate 0.7 x 0.5 x 0.2 cm. The specimen is  entirely submitted for intraoperative diagnosis and then for permanent in one  cassette, B1 FSC. RXA/RXH    C: Received in formalin, labeled with the patient's " "name and hospital number  and \"right tonsil biopsy #1\", is a tan soft tissue fragment measuring 0.4 x 0.4  x 0.2 cm. The specimen is submitted in toto in one cassette, C1. SBS    D:  Received in formalin, labeled with the patient's name and hospital number  and \"right tonsil biopsy #2\", is a tan soft tissue fragment measuring 0.9 x 0.7  x 0.5 cm. The specimen is submitted in toto in one cassette, D1. SBS      rx/11/22/2022           The assays/tests were performed with appropriate positive and negative controls  which stained appropriately.    [facility]  Department of Pathology   [address] [place] [address]        PATHREP  09/02/2016     Name BABAK GROSS                                                                                                   Accession #: [id number]            Pathologist:                     Date of Procedure:    9/2/2016  Date Received:          9/6/2016  Date Reported           9/7/2016  Submitting Physician:     REQUESTING PROVIDER: PRITI العلي  Location:                      Other External #                                                                    FINAL DIAGNOSIS  DIAGNOSIS:  (A)  Biopsy of esophagus:     -  Fragments of benign gastroesophageal mucosa with moderate        chronic active inflammation and foci of ulcer debris.     -  Goblet cell metaplasia is not demonstrated.     -  Dysplasia is not seen.    (B)  Biopsy of antrum:    -  Fragments of benign gastric antral/fundal mucosa with features       of chemical (reactive) gastropathy.    -  H. pylori organisms are not demonstrated (Giemsa stain).             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     -  Please see Comment.    Note  COMMENT:  (A)  Viral nuclear inclusions and hyphal elements are not demonstrated.    (B)  Clinical correlation is requested with origin of the biopsy from the    incisura region where goblet cell metaplasia is a normal " finding.                                                                                                                                                                                                                                                                                                                                                                                                                                                                                        By the signature on this report, the individual or group listed as making the  Final Interpretation/Diagnosis certifies that they have reviewed this case.  PROFESSIONAL CODES:  CPT:  88305 x 2, 40314    ICD10:  K92.2, K92.0, K21.0, K29.60    ALEA SMALLS MD    (Electronically signed by)    Verified: 09/07/16    [facility]             Microscopic Description:  MICROSCOPIC DESCRIPTION:  Microscopic slides examined.    [facility]      Clinical History:  SPECIMEN SOURCE:  (A)  Biopsy of esophagus    (B)  Biopsy of antrum    CLINICAL INFORMATION:  Upper GI bleed and hematemesis      Specimens Submitted As:  A: Esophageal biopsy   B: Antral biopsy       Other Related Clinical Data  PROFESSIONAL CODES:  CPT:  88305 x 2, 47792    ICD10:  K92.2, K92.0, K21.0, K29.60    ALEA SMALLS MD    (Electronically signed by)    Verified: 09/07/16    [facility]  Gross Description:  GROSS DESCRIPTION:  (A)  Received in formalin are four fragments of pink-tan mucosa-covered soft    tissue measuring 0.3 x 0.2 x 0.2 cm. in aggregate. The soft tissue is grossly    unremarkable and entirely submitted for routine microscopic evaluation.    (B)  Received in formalin is a single fragment of pink-tan mucosa-covered soft    tissue measuring 0.2 x 0.2 x 0.2 cm. The soft tissue is grossly unremarkable    and entirely submitted for routine microscopic evaluation.    [facility]  Department of Pathology   [address] [place] [address]  [place] [address]         CT Soft Tissue Neck 3/8/24  IMPRESSION:  Posttreatment related changes as described. No evidence of residual  or recurrent disease.      Unchanged heterogeneous right level 2 lymph node measuring 1.1 x 0.9  cm. No additional cervical lymphadenopathy by size criteria      Please refer to separately dictated CT of the chest performed same  day for respective findings.      I personally reviewed the images/study and I agree with the findings  as stated by Dr. Montelongo.    CT Chest 3/8/24  IMPRESSION:  1.  There is interval appearance of scattered lingular tree-in-bud  opacities most consistent with mucoid impaction or aspiration. There  are no suspicious parenchymal lung nodules. Recommend continued  radiographic follow-up to document resolution of lingular opacities  and confirm an aspiration etiology. No definite evidence of  metastatic disease.    Assessment/Plan   BABAK GROSS is a 42 year old Male with history of polysubstance abuse (snort cocaine, tobacco), recent found locally advanced OPSCC (p16+), started chemo rad on  1/11.     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   - s/p chemoradiation 1/11-2/28/22  - 3/8/23:  labs are stable Na wnl, he still drink and eat ok by mouth, mucositis related pain is tolerable with current meds. Pain med is managed by supp onc, wt loss due to pain.  Pt was over-using both BMX and dex, reinforced patient about appropriate  use of all the meds.  Pain meds are strictly managed by supp onc.  - 6/1/23: PET/CT showed very good response. Near complete interval resolution of hypermetabolic activity  within bilateral  cervical lymph node stations. Mild residual josse activity within a left cervical lymph node is indeterminate in nature and residual viable malignancy cannot be excluded. Persistent mouth pain mostly only with eating, some wt loss. Encouraged  pt to continue increase boost intake for extra nutritional support and f/u with dietitian.  I will arrange repeat CT neck with contrast in 3 months to f/u on his residual josse activity.  -6/29/23: swelling in submandibular & bilateral posterior neck tightness/pain, repeat CT neck on 6/18 showed no new lesions, left cervical LN was the same size as seen in 6/1 PET/CT.  Swelling is likely post treatment related, but advised pt to call us  if swelling or neck tightness/pain worsens.   -9/14/23: ongoing wt issue due to mouth pain, fluid reflux from nostril with drinking. Seen by speech, supp onc and dietitian today. CT neck will be rescheduled to f/u on residual josse activity seen in 6/1/23.  -9/23/23: CT neck showed stable right level 2 lymph node  -11/9/23: ongoing dry mouth with rotten/broken teeth, clear discharge from both ear canal with decreased hearing.  He will see ENT next week and also dentist.  -12/7/23: ongoing dry mouth with rotten/broken teeth, insurance issues related to dental work up, seen by  JUNI today.   -3/14/24: restaging scan CT n/c from 3/8 showed IRENE.  Wt stable on daily protein drinks, ongoing mouth pain but less, stable stiff neck. No new symptoms.  Sees dentist on 3/29 for teeth extraction  -8/22/24: Wt stable, ongoing mouth pain, sees supp onc, also scheduled to see psych in Sept.  I told patient that I am leaving , he will continue follow up with Rad onc and one of our HN provider.  -04/08/25: Patient in group home in [place]. Scans reviewed, IRENE. Prefers to follow at [facility] for continued care    Plan:  -RTC in 3 months with labs on the same day  -Continue f/u with Rad onc, dietitian and supp onc

## 2025-04-09 ENCOUNTER — TELEPHONE (OUTPATIENT)
Dept: RADIATION ONCOLOGY | Facility: HOSPITAL | Age: 45
End: 2025-04-09
Payer: MEDICAID

## 2025-04-22 ENCOUNTER — APPOINTMENT (OUTPATIENT)
Dept: HEMATOLOGY/ONCOLOGY | Facility: HOSPITAL | Age: 45
End: 2025-04-22
Payer: MEDICAID

## 2025-05-01 ENCOUNTER — TELEPHONE (OUTPATIENT)
Dept: HEMATOLOGY/ONCOLOGY | Facility: HOSPITAL | Age: 45
End: 2025-05-01

## 2025-05-02 ENCOUNTER — NUTRITION (OUTPATIENT)
Dept: HEMATOLOGY/ONCOLOGY | Facility: HOSPITAL | Age: 45
End: 2025-05-02
Payer: MEDICAID

## 2025-05-02 NOTE — PROGRESS NOTES
NUTRITION COMMUNICATION NOTE    Shayne Messer     REASON FOR COMMUNICATION:     Pt called today requesting ONS be ordered - he left a VM  Iraan was called  Ensure Plus  reordered  Pt was called so he knows- had to leave VM  Please note Donna hs correct address.      Wt Readings from Last 10 Encounters:   01/28/25 77.1 kg (170 lb)   12/16/24 77.1 kg (170 lb)   09/26/24 77.1 kg (170 lb)   09/25/24 77.8 kg (171 lb 9.6 oz)   09/10/24 70.9 kg (156 lb 3.2 oz)   08/22/24 74.5 kg (164 lb 3.9 oz)   08/07/24 72.6 kg (160 lb)   06/13/24 73.5 kg (162 lb)   05/29/24 75.1 kg (165 lb 9.1 oz)   05/15/24 71.1 kg (156 lb 11.2 oz)             Time Spent  Prep time on day of patient encounter: 5 minutes  Time spent directly with patient, family or caregiver: 0 minutes  Additional Time Spent on Patient Care Activities: 5 minutes  Documentation Time: 5 minutes  Other Time Spent: 5 minutes  Total: 20 minutes

## 2025-06-03 ENCOUNTER — TELEPHONE (OUTPATIENT)
Dept: ADMINISTRATIVE | Age: 45
End: 2025-06-03

## 2025-06-03 NOTE — TELEPHONE ENCOUNTER
Vale from [facility] called and requested to schedule referral for history of mouth cancer.  Pt is residing there, and she is unsure if he has a working phone.  Please contact her at [phone] to schedule.

## 2025-06-11 ENCOUNTER — HOSPITAL ENCOUNTER (OUTPATIENT)
Dept: GENERAL RADIOLOGY | Age: 45
Discharge: HOME | End: 2025-06-13
Payer: MEDICAID

## 2025-06-11 ENCOUNTER — HOSPITAL ENCOUNTER (OUTPATIENT)
Dept: HYPERBARIC MEDICINE | Age: 45
Discharge: HOME | End: 2025-06-11
Payer: MEDICAID

## 2025-06-11 ENCOUNTER — HOSPITAL ENCOUNTER (OUTPATIENT)
Age: 45
Discharge: HOME | End: 2025-06-13
Payer: MEDICAID

## 2025-06-11 VITALS
TEMPERATURE: 97.2 F | SYSTOLIC BLOOD PRESSURE: 138 MMHG | RESPIRATION RATE: 16 BRPM | HEART RATE: 84 BPM | DIASTOLIC BLOOD PRESSURE: 88 MMHG

## 2025-06-11 DIAGNOSIS — Z92.3 PERSONAL HISTORY OF RADIATION THERAPY: ICD-10-CM

## 2025-06-11 DIAGNOSIS — K02.9 CARIOUS TEETH: ICD-10-CM

## 2025-06-11 DIAGNOSIS — C76.0 HEAD AND NECK CANCER (HCC): ICD-10-CM

## 2025-06-11 DIAGNOSIS — M27.2 OSTEORADIONECROSIS OF JAW: Primary | ICD-10-CM
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
## 2025-06-11 DIAGNOSIS — M27.2 OSTEORADIONECROSIS OF JAW: ICD-10-CM

## 2025-06-11 DIAGNOSIS — Z96.22 STATUS POST MYRINGOTOMY WITH TUBE PLACEMENT OF BOTH EARS: ICD-10-CM
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
## 2025-06-11 DIAGNOSIS — F17.200 TOBACCO DEPENDENCE: ICD-10-CM

## 2025-06-11 DIAGNOSIS — T66.XXXS LATE EFFECT OF RADIATION: ICD-10-CM

## 2025-06-11 PROCEDURE — 99204 OFFICE O/P NEW MOD 45 MIN: CPT | Performed by: SURGERY

## 2025-06-11 PROCEDURE — 71046 X-RAY EXAM CHEST 2 VIEWS: CPT

## 2025-06-11 PROCEDURE — 99212 OFFICE O/P EST SF 10 MIN: CPT

## 2025-06-11 NOTE — PROGRESS NOTES
negative effect on treatment and may diminish the benefits you may receive.  Smoking within 2 hours prior to your treatment poses additional risk and should be avoided completely.    Drinking alcohol or using illicit drugs may also have a negative effect on your treatment and should be avoided.    Drinking carbonated beverages such as soda pop within 1 hour of your treatment could cause pains in the stomach during the treatment.  Caffeinated beverages or foods containing caffeine should be avoided before your treatment.  Please let us know if we can assist you with seeking help to stop smoking, drinking or using recreational drugs.      PROHIBITED ITEMS INFORMATION REVIEWED: Yes   No wigs, hair spray, hair oils, hair ornaments, creams, lotions, make-up, after shave, perfumes, colognes, chap stick, lip balms, mustache waxes, petroleum products (e.g. Vaseline), baby oil, deodorant or ointments  No nail polish    No synthetic clothing  No nylon hose, underwear, panty hose or bra  No food, gum or candy  No jewelry  No smoking materials such as lighter, cigarettes or matches  No reading material  No hearing aids, non-fixed dentures  No Hard (non-gas permeable) contact lenses  Most dressings are approved to wear into the hyperbaric chamber. Please advise the hyperbaric staff of any new dressings applied to your wound to ensure the new dressings are compatible with hyperbaric oxygen treatments.  No alcohol preps  No heat packs  No street clothes or shoes  No cell phones or other electronics  If it was not a part of you when you were born into this world, if it was not provided by the chamber , then it cannot go into the chamber with you.        Electronically signed by Mauro Tolentino LPN on 6/11/2025 at 2:38 PM

## 2025-06-11 NOTE — CONSULTS
present    3.  Tobacco use    4.  Past history of amphetamine use disorder, depression, paranoid schizophrenia etc.          Patient Active Problem List   Diagnosis    Amphetamine use disorder, moderate (HCC)    Delusional disorder (HCC)    Major depressive disorder, recurrent episode    Paranoid schizophrenia (HCC)    Personal history of radiation therapy    Tobacco dependence    Status post myringotomy with tube placement of both ears    Late effect of radiation    Carious teeth            Plan:           Discussed in detail with the patient regarding all options, risks benefits and alternatives, as the patient has a carious teeth, recommend total mouth teeth extraction, patient would benefit from HBOT therapy per box protocol, 20 treatments prior to tooth extraction possibly 10 treatments later after tooth extraction    Patient recommend also chest x-ray for baseline purposes given your history of tobacco use    Patient also recommended, after he is completed his [place] protocol SpO2 therapy, and if he is doing well, to contact his ENT consultant, regarding the status of the myringotomy tubes      All the questions were answered.    Discussed with hyperbaric oxygen therapy nursing personnel, to schedule and coordinate his treatments to time with his tooth extraction    Thank you for letting us participate in the care of your patient      Electronically signed by Christiano Geller MD on 6/11/2025 at 5:02 PM

## 2025-06-12 ENCOUNTER — TELEPHONE (OUTPATIENT)
Dept: VASCULAR SURGERY | Age: 45
End: 2025-06-12

## 2025-06-12 NOTE — TELEPHONE ENCOUNTER
I tried to call the patient regarding the chest x-ray results, that revealed possible right lower lobe nodule, 2 x 2 cm, unable to reach the patient, I will request the HBOT department also to reach out to the patient

## 2025-06-16 ENCOUNTER — TELEPHONE (OUTPATIENT)
Dept: HYPERBARIC MEDICINE | Age: 45
End: 2025-06-16